# Patient Record
Sex: FEMALE | Race: ASIAN | NOT HISPANIC OR LATINO | Employment: OTHER | URBAN - METROPOLITAN AREA
[De-identification: names, ages, dates, MRNs, and addresses within clinical notes are randomized per-mention and may not be internally consistent; named-entity substitution may affect disease eponyms.]

---

## 2017-02-23 LAB
ALBUMIN SERPL-MCNC: 4.5 G/DL (ref 3.6–4.8)
ALBUMIN/CREAT UR: <7.2 MG/G CREAT (ref 0–30)
ALBUMIN/GLOB SERPL: 1.7 {RATIO} (ref 1.1–2.5)
ALP SERPL-CCNC: 42 IU/L (ref 39–117)
ALT SERPL-CCNC: 13 IU/L (ref 0–32)
AST SERPL-CCNC: 16 IU/L (ref 0–40)
BILIRUB SERPL-MCNC: 0.6 MG/DL (ref 0–1.2)
BUN SERPL-MCNC: 13 MG/DL (ref 8–27)
BUN/CREAT SERPL: 17 (ref 11–26)
CALCIUM SERPL-MCNC: 9.5 MG/DL (ref 8.7–10.3)
CHLORIDE SERPL-SCNC: 103 MMOL/L (ref 96–106)
CHOLEST SERPL-MCNC: 170 MG/DL (ref 100–199)
CO2 SERPL-SCNC: 26 MMOL/L (ref 18–29)
COMMENT 011824: NORMAL
CREAT SERPL-MCNC: 0.77 MG/DL (ref 0.57–1)
CREAT UR-MCNC: 41.4 MG/DL
GLOBULIN SER CALC-MCNC: 2.6 G/DL (ref 1.5–4.5)
GLUCOSE SERPL-MCNC: 111 MG/DL (ref 65–99)
HBA1C MFR BLD: 6.7 % (ref 4.8–5.6)
HDLC SERPL-MCNC: 56 MG/DL
LDLC SERPL CALC-MCNC: 85 MG/DL (ref 0–99)
MICROALBUMIN UR-MCNC: <3 UG/ML
POTASSIUM SERPL-SCNC: 4.1 MMOL/L (ref 3.5–5.2)
PROT SERPL-MCNC: 7.1 G/DL (ref 6–8.5)
SODIUM SERPL-SCNC: 144 MMOL/L (ref 134–144)
T4 FREE SERPL-MCNC: 1.44 NG/DL (ref 0.82–1.77)
TRIGL SERPL-MCNC: 147 MG/DL (ref 0–149)
TSH SERPL DL<=0.005 MIU/L-ACNC: 0.47 UIU/ML (ref 0.45–4.5)
VLDLC SERPL CALC-MCNC: 29 MG/DL (ref 5–40)

## 2017-02-27 ENCOUNTER — OFFICE VISIT (OUTPATIENT)
Dept: MEDICAL GROUP | Facility: PHYSICIAN GROUP | Age: 67
End: 2017-02-27
Payer: MEDICARE

## 2017-02-27 VITALS
TEMPERATURE: 98.6 F | DIASTOLIC BLOOD PRESSURE: 80 MMHG | RESPIRATION RATE: 16 BRPM | HEART RATE: 83 BPM | HEIGHT: 60 IN | SYSTOLIC BLOOD PRESSURE: 128 MMHG | BODY MASS INDEX: 28.86 KG/M2 | WEIGHT: 147 LBS | OXYGEN SATURATION: 91 %

## 2017-02-27 DIAGNOSIS — E11.9 DIABETES MELLITUS WITHOUT COMPLICATION (HCC): ICD-10-CM

## 2017-02-27 DIAGNOSIS — E03.9 HYPOTHYROIDISM, UNSPECIFIED TYPE: ICD-10-CM

## 2017-02-27 DIAGNOSIS — F33.9 RECURRENT MAJOR DEPRESSIVE DISORDER, REMISSION STATUS UNSPECIFIED (HCC): ICD-10-CM

## 2017-02-27 DIAGNOSIS — E78.5 DYSLIPIDEMIA: ICD-10-CM

## 2017-02-27 DIAGNOSIS — I10 ESSENTIAL HYPERTENSION: ICD-10-CM

## 2017-02-27 DIAGNOSIS — K21.9 GASTROESOPHAGEAL REFLUX DISEASE, ESOPHAGITIS PRESENCE NOT SPECIFIED: ICD-10-CM

## 2017-02-27 PROCEDURE — 3017F COLORECTAL CA SCREEN DOC REV: CPT | Performed by: FAMILY MEDICINE

## 2017-02-27 PROCEDURE — 3044F HG A1C LEVEL LT 7.0%: CPT | Performed by: FAMILY MEDICINE

## 2017-02-27 PROCEDURE — 1101F PT FALLS ASSESS-DOCD LE1/YR: CPT | Performed by: FAMILY MEDICINE

## 2017-02-27 PROCEDURE — 3014F SCREEN MAMMO DOC REV: CPT | Performed by: FAMILY MEDICINE

## 2017-02-27 PROCEDURE — G8420 CALC BMI NORM PARAMETERS: HCPCS | Performed by: FAMILY MEDICINE

## 2017-02-27 PROCEDURE — 1036F TOBACCO NON-USER: CPT | Performed by: FAMILY MEDICINE

## 2017-02-27 PROCEDURE — 99214 OFFICE O/P EST MOD 30 MIN: CPT | Performed by: FAMILY MEDICINE

## 2017-02-27 PROCEDURE — G8432 DEP SCR NOT DOC, RNG: HCPCS | Performed by: FAMILY MEDICINE

## 2017-02-27 PROCEDURE — G8482 FLU IMMUNIZE ORDER/ADMIN: HCPCS | Performed by: FAMILY MEDICINE

## 2017-02-27 PROCEDURE — 4040F PNEUMOC VAC/ADMIN/RCVD: CPT | Performed by: FAMILY MEDICINE

## 2017-02-27 RX ORDER — METOPROLOL SUCCINATE 50 MG/1
50 TABLET, EXTENDED RELEASE ORAL DAILY
Qty: 90 TAB | Refills: 3 | Status: SHIPPED | OUTPATIENT
Start: 2017-02-27 | End: 2018-03-26 | Stop reason: SDUPTHER

## 2017-02-27 RX ORDER — PANTOPRAZOLE SODIUM 40 MG/1
TABLET, DELAYED RELEASE ORAL
Qty: 90 TAB | Refills: 3 | Status: SHIPPED | OUTPATIENT
Start: 2017-02-27 | End: 2018-04-24 | Stop reason: SDUPTHER

## 2017-02-27 RX ORDER — ESCITALOPRAM OXALATE 10 MG/1
TABLET ORAL
Qty: 90 TAB | Refills: 3 | Status: SHIPPED | OUTPATIENT
Start: 2017-02-27 | End: 2018-03-26 | Stop reason: SDUPTHER

## 2017-02-27 RX ORDER — METFORMIN HYDROCHLORIDE 500 MG/1
500 TABLET, EXTENDED RELEASE ORAL DAILY
Qty: 90 TAB | Refills: 3 | Status: SHIPPED | OUTPATIENT
Start: 2017-02-27 | End: 2018-01-24

## 2017-02-27 RX ORDER — LOSARTAN POTASSIUM 50 MG/1
TABLET ORAL
Qty: 90 TAB | Refills: 3 | Status: SHIPPED | OUTPATIENT
Start: 2017-02-27 | End: 2018-03-26 | Stop reason: SDUPTHER

## 2017-02-27 NOTE — MR AVS SNAPSHOT
Bernadette Freitas   2017 2:20 PM   Office Visit   MRN: [id number]    Department:  [facility]   Dept Phone:  [phone]    Description:  Female : 1950   Provider:  Renetta Khan M.D.           Reason for Visit     Follow-Up 3 month follow up       Allergies as of 2017     Allergen Noted Reactions    Penicillins 2008   Vomiting      You were diagnosed with     Dyslipidemia   [645050]       Diabetes mellitus without complication (CMS-HCC)   [340415]       Essential hypertension   [7511613]       Gastroesophageal reflux disease, esophagitis presence not specified   [3547239]       Hypothyroidism, unspecified type   [3903505]       Recurrent major depressive disorder, remission status unspecified (CMS-HCC)   [4145622]         Vital Signs     Blood Pressure Pulse Temperature Respirations Height Weight    128/80 mmHg 83 37 °C (98.6 °F) 16 1.524 m (5') 66.679 kg (147 lb)    Body Mass Index Oxygen Saturation Last Menstrual Period Smoking Status          28.71 kg/m2 91% 2006 Former Smoker        Basic Information     Date Of Birth Sex Race Ethnicity Preferred Language    1950 Female  Non- English      Your appointments     May 30, 2017  3:40 PM   Established Patient with Renetta Khan M.D.   [facility] (--)    [address]  [address]  [facility] [address] [phone]           You will be receiving a confirmation call a few days before your appointment from our automated call confirmation system.              Problem List              ICD-10-CM Priority Class Noted - Resolved    HTN (hypertension) I10   Unknown - Present    Hypothyroidism E03.9   Unknown - Present    Dyslipidemia E78.5   Unknown - Present    Depression F32.9   Unknown - Present    Impaired fasting glucose R73.01   Unknown - Present    GERD (gastroesophageal reflux disease) K21.9   2010 - Present    Essential hypertension I10   2015 - Present    Essential hypertension  I10   8/22/2016 - Present    Diabetes mellitus without complication (CMS-[facility]) E11.9   11/28/2016 - Present      Health Maintenance        Date Due Completion Dates    IMM ZOSTER VACCINE 8/24/2011 ---    DIABETES MONOFILAMENT / LE EXAM 5/8/2013 5/8/2012 (N/S)    Override on 5/8/2012: (N/S) (not diabetic)    PAP SMEAR 1/22/2016 1/22/2013, 9/29/2010    RETINAL SCREENING 7/24/2016 6/24/2016    BONE DENSITY 8/24/2016 ---    COLONOSCOPY 11/19/2016 11/19/2013    MAMMOGRAM 2/16/2017 2/16/2016, 2/5/2016, 2/5/2016, 2/5/2016, 8/1/2014, 10/11/2010, 9/16/2009, 9/16/2009    A1C SCREENING 8/22/2017 2/22/2017, 11/21/2016, 8/16/2016, 5/9/2016, 1/26/2016, 7/13/2015, 7/13/2015 (Done), 9/9/2014 (Done), 2/5/2014 (N/S), 12/18/2012, 12/13/2011, 9/29/2010    Override on 7/13/2015: Done    Override on 9/9/2014: Done    Override on 2/5/2014: (N/S)    FASTING LIPID PROFILE 2/22/2018 2/22/2017, 11/21/2016, 8/16/2016, 5/9/2016, 1/26/2016, 7/13/2015, 7/13/2015 (Done), 9/9/2014 (Done), 2/5/2014 (Done), 12/18/2012, 12/13/2011, 6/8/2011, 9/29/2010    Override on 7/13/2015: Done    Override on 9/9/2014: Done    Override on 2/5/2014: Done    URINE ACR / MICROALBUMIN 2/22/2018 2/22/2017, 1/26/2016, 6/3/2014 (Done), 2/5/2014 (Done), 5/8/2012 (N/S)    Override on 6/3/2014: Done    Override on 2/5/2014: Done    Override on 5/8/2012: (N/S) (not diabetic)    SERUM CREATININE 2/22/2018 2/22/2017, 11/21/2016, 9/26/2016, 8/16/2016, 5/9/2016, 1/26/2016, 7/13/2015, 7/13/2015 (Done), 9/9/2014 (Done), 2/5/2014 (Done), 12/18/2012, 12/13/2011, 6/8/2011, 9/29/2010, 6/3/2008, 6/2/2008, 6/1/2008, 5/31/2008, 5/30/2008    Override on 7/13/2015: Done    Override on 9/9/2014: Done    Override on 2/5/2014: Done    IMM PNEUMOCOCCAL 65+ (ADULT) LOW/MEDIUM RISK SERIES (2 of 2 - PPSV23) 7/21/2020 11/1/2016, 7/21/2015    IMM DTaP/Tdap/Td Vaccine (2 - Td) 1/22/2023 1/22/2013, 3/15/2006            Current Immunizations     13-VALENT PCV PREVNAR 11/1/2016    Influenza Vaccine  Adult HD 11/1/2016    Pneumococcal polysaccharide vaccine (PPSV-23) 7/21/2015    TD Vaccine 3/15/2006    Tdap Vaccine 1/22/2013      Below and/or attached are the medications your provider expects you to take. Review all of your home medications and newly ordered medications with your provider and/or pharmacist. Follow medication instructions as directed by your provider and/or pharmacist. Please keep your medication list with you and share with your provider. Update the information when medications are discontinued, doses are changed, or new medications (including over-the-counter products) are added; and carry medication information at all times in the event of emergency situations     Allergies:  PENICILLINS - Vomiting               Medications  Valid as of: February 27, 2017 -  2:46 PM    Generic Name Brand Name Tablet Size Instructions for use    ALPRAZolam (Tab) XANAX 0.25 MG Take 1 Tab by mouth 3 times a day as needed for Sleep or Anxiety.        ALPRAZolam (Tab) XANAX 0.25 MG Take 1 Tab by mouth 3 times a day as needed for Sleep or Anxiety.        AmLODIPine Besylate (Tab) NORVASC 10 MG QD        AmLODIPine Besylate (Tab) NORVASC 10 MG Take 1 Tab by mouth every day.        Atorvastatin Calcium (Tab) LIPITOR 20 MG Take 1 Tab by mouth every bedtime.        Atorvastatin Calcium (Tab) LIPITOR 40 MG Take 1 tablet by mouth  every evening        Atorvastatin Calcium (Tab) LIPITOR 40 MG TAKE 1 TABLET BY MOUTH ONCE DAILY IN THE EVENING        Blood Glucose Monitoring Suppl (Misc) Blood Glucose Monitoring Suppl SUPPLIES Test strips order: Test strips for glucometer. Sig: use before breakfast and prn ssx high or low sugar #50 RF x 5        Escitalopram Oxalate (Tab) LEXAPRO 10 MG Take 1 tablet by mouth  daily        Esomeprazole Magnesium (CAPSULE DELAYED RELEASE) NEXIUM 40 MG Take 1 Cap by mouth every morning before breakfast.        HydroCHLOROthiazide (Cap) MICROZIDE 12.5 MG Take 1 Cap by mouth every day.         Lancets (Misc) Lancets  Lancets order: Lancets for glucometer. Sig: use before breakfast and prn ssx high or low sugar. #50 RF x 5        Lancets (Misc) TRUEPLUS LANCETS 28G  USE ONCE DAILY AND AS NEEDED FOR SYMPTOMS OF HIGH OR LOW SUGAR        Levothyroxine Sodium (Tab) SYNTHROID 100 MCG Take 1 Tab by mouth every day.        Levothyroxine Sodium (Tab) SYNTHROID 88 MCG Take 1 Tab by mouth Every morning on an empty stomach.        Levothyroxine Sodium (Tab) SYNTHROID 75 MCG Take 1 Tab by mouth Every morning on an empty stomach.        Levothyroxine Sodium (Tab) SYNTHROID 50 MCG Take 1 Tab by mouth Every morning on an empty stomach.        Levothyroxine Sodium (Tab) SYNTHROID 75 MCG Take 1 Tab by mouth Every morning on an empty stomach.        Losartan Potassium (Tab) COZAAR 50 MG Take 1 tablet by mouth  daily        Meloxicam (Tab) MOBIC 15 MG Take 1 Tab by mouth every day.        MetFORMIN HCl (Tab) GLUCOPHAGE 500 MG Take 1 Tab by mouth 2 times a day, with meals.        MetFORMIN HCl (TABLET SR 24 HR) GLUCOPHAGE  MG Take 1 Tab by mouth every day.        Metoprolol Succinate (TABLET SR 24 HR) TOPROL XL 50 MG Take 1 Tab by mouth every day.        Mometasone Furoate (Suspension) NASONEX 50 MCG/ACT Spray 2 Act in nose every day. Each nostril        Omega-3 Fatty Acids (Cap) OMEGA 3 FA 1000 MG Take 1,000 mg by mouth 2 Times a Day.        Pantoprazole Sodium (Tablet Delayed Response) PROTONIX 40 MG Take 1 tablet by mouth  every day        Pravastatin Sodium (Tab) PRAVACHOL 40 MG Take 1 Tab by mouth every evening.        Triamcinolone Acetonide (Cream) KENALOG 0.1 % Apply to affedted areas twice daily.        .                 Medicines prescribed today were sent to:     [id number] - [place]A MCINTOSH - [address] AT [facility] OF [address]    [address] [address]    Phone: [phone] Fax: [phone]    Open 24 Hours?: No      Medication refill instructions:       If your  prescription bottle indicates you have medication refills left, it is not necessary to call your provider’s office. Please contact your pharmacy and they will refill your medication.    If your prescription bottle indicates you do not have any refills left, you may request refills at any time through one of the following ways: The online [employer] system (except Urgent Care), by calling your provider’s office, or by asking your pharmacy to contact your provider’s office with a refill request. Medication refills are processed only during regular business hours and may not be available until the next business day. Your provider may request additional information or to have a follow-up visit with you prior to refilling your medication.   *Please Note: Medication refills are assigned a new Rx number when refilled electronically. Your pharmacy may indicate that no refills were authorized even though a new prescription for the same medication is available at the pharmacy. Please request the medicine by name with the pharmacy before contacting your provider for a refill.        Your To Do List     Future Labs/Procedures Complete By Expires    COMP METABOLIC PANEL  As directed 2/28/2018    HEMOGLOBIN A1C  As directed 2/28/2018    LIPID PROFILE  As directed 2/28/2018         [employer] Access Code: Activation code not generated  Current [employer] Status: Active

## 2017-02-28 NOTE — PROGRESS NOTES
Subjective:      Bernadette Freitas is a 66 y.o. female who presents with Follow-Up            HPI     Patient is here for follow-up of her medical problems.    Female, she continues to take atorvastatin 40 mg daily without any myalgias.    In terms of her diabetes mellitus type 2, she continues to take metformin xr 500 mg one tablet daily without any side effects. Blood work was done before his visit.    Her blood pressures under control on losartan and metoprolol without any side effects. Patient denies a headache, dizziness, lightheadedness, chest pain, palpitations, shortness of breath, leg edema.    For her GERD, she continues to take pantoprazole 40 mg one tablet daily on a regular basis. She says she still gets gassiness with burping even with the pantoprazole on a daily basis. She states that she is already due for screening colonoscopy because of prior polyps that were precancerous.    In terms of her hypothyroidism, she continues to take thyroid replacement regularly.    She continues to take Lexapro regularly on a daily basis with good control of her depression.    Past medical history, past surgical history, family history reviewed-no changes    Social history reviewed-no changes    Allergies reviewed-no changes    Medications reviewed-no changes    ROS     Review of systems as per history of present illness, the rest are negative     Objective:     /80 mmHg  Pulse 83  Temp(Src) 37 °C (98.6 °F)  Resp 16  Ht 1.524 m (5')  Wt 66.679 kg (147 lb)  BMI 28.71 kg/m2  SpO2 91%  LMP 11/01/2006     Physical Exam     Examined alert, awake, oriented, not in distress    Neck-supple, no lymphadenopathy, no thyromegaly  Lungs-clear to auscultation, no rales, no wheezes  Heart-regular rate and rhythm, no murmur  Extremities-no edema, clubbing, cyanosis     Orders Only on 02/22/2017   Component Date Value   • Glucose 02/22/2017 111*   • Bun 02/22/2017 13    • Creatinine 02/22/2017 0.77    • GFR If Non   Ameri* 02/22/2017 81    • GFR If  02/22/2017 94    • Bun-Creatinine Ratio 02/22/2017 17    • Sodium 02/22/2017 144    • Potassium 02/22/2017 4.1    • Chloride 02/22/2017 103    • Co2 02/22/2017 26    • Calcium 02/22/2017 9.5    • Total Protein 02/22/2017 7.1    • Albumin 02/22/2017 4.5    • Globulin 02/22/2017 2.6    • A-G Ratio 02/22/2017 1.7    • Total Bilirubin 02/22/2017 0.6    • Alkaline Phosphatase 02/22/2017 42    • AST(SGOT) 02/22/2017 16    • ALT(SGPT) 02/22/2017 13    • Cholesterol,Tot 02/22/2017 170    • Triglycerides 02/22/2017 147    • HDL 02/22/2017 56    • VLDL Cholesterol Calc 02/22/2017 29    • LDL 02/22/2017 85    • Comment: 02/22/2017 CANCELED    • Creatinine, Random Urine 02/22/2017 41.4    • Microalbumin, Urine Rand* 02/22/2017 <3.0    • Microalbumin-Creatinine 02/22/2017 <7.2    • Glycohemoglobin 02/22/2017 6.7*previously 6.7    • Free T-4 02/22/2017 1.44    • TSH 02/22/2017 0.465         Assessment/Plan:     1. Dyslipidemia  All at target on atorvastatin.  - LIPID PROFILE; Future  - COMP METABOLIC PANEL; Future  - HEMOGLOBIN A1C; Future    2. Diabetes mellitus without complication (CMS-HCC)  Adequately controlled on metformin.  - metformin ER (GLUCOPHAGE XR) 500 MG TABLET SR 24 HR; Take 1 Tab by mouth every day.  Dispense: 90 Tab; Refill: 3  - LIPID PROFILE; Future  - COMP METABOLIC PANEL; Future  - HEMOGLOBIN A1C; Future    3. Essential hypertension  Controlled on her medications.  - losartan (COZAAR) 50 MG Tab; Take 1 tablet by mouth  daily  Dispense: 90 Tab; Refill: 3  - metoprolol SR (TOPROL XL) 50 MG TABLET SR 24 HR; Take 1 Tab by mouth every day.  Dispense: 90 Tab; Refill: 3  - LIPID PROFILE; Future  - COMP METABOLIC PANEL; Future  - HEMOGLOBIN A1C; Future    4. Gastroesophageal reflux disease, esophagitis presence not specified  Pantoprazole controls her GERD symptoms but she continues to have burping and gassiness. Advised to get over-the-counter probiotics. She  will set up appointment with GI specialist because she is also due to have another colonoscopy. This way they could do upper endoscopy if they think it is necessary.  - pantoprazole (PROTONIX) 40 MG Tablet Delayed Response; Take 1 tablet by mouth  every day  Dispense: 90 Tab; Refill: 3  - LIPID PROFILE; Future  - COMP METABOLIC PANEL; Future  - HEMOGLOBIN A1C; Future    5. Hypothyroidism, unspecified type  This is adequately replaced. Continue same dose of thyroid medication.  - LIPID PROFILE; Future  - COMP METABOLIC PANEL; Future  - HEMOGLOBIN A1C; Future    6. Recurrent major depressive disorder, remission status unspecified (CMS-HCC)  Stable on Lexapro.  - escitalopram (LEXAPRO) 10 MG Tab; Take 1 tablet by mouth  daily  Dispense: 90 Tab; Refill: 3  - LIPID PROFILE; Future  - COMP METABOLIC PANEL; Future  - HEMOGLOBIN A1C; Future    We will do a GYN exam/Pap smear next visit      Please note that this dictation was created using voice recognition software. I have worked with consultants from the vendor as well as technical experts from [employer] to optimize the interface. I have made every reasonable attempt to correct obvious errors, but I expect that there are errors of grammar and possibly content I did not discover before finalizing the note.

## 2017-02-28 NOTE — PATIENT INSTRUCTIONS
Patient instructions given regarding labs, x-rays, medications, referral and followup appointment.

## 2017-05-08 DIAGNOSIS — E78.5 DYSLIPIDEMIA: ICD-10-CM

## 2017-05-08 RX ORDER — ATORVASTATIN CALCIUM 40 MG/1
TABLET, FILM COATED ORAL
Qty: 90 TAB | Refills: 1 | Status: SHIPPED | OUTPATIENT
Start: 2017-05-08 | End: 2018-01-02 | Stop reason: SDUPTHER

## 2017-05-25 LAB
ALBUMIN SERPL-MCNC: 4.1 G/DL (ref 3.6–4.8)
ALBUMIN/GLOB SERPL: 1.6 {RATIO} (ref 1.2–2.2)
ALP SERPL-CCNC: 41 IU/L (ref 39–117)
ALT SERPL-CCNC: 13 IU/L (ref 0–32)
AST SERPL-CCNC: 17 IU/L (ref 0–40)
BILIRUB SERPL-MCNC: 0.5 MG/DL (ref 0–1.2)
BUN SERPL-MCNC: 18 MG/DL (ref 8–27)
BUN/CREAT SERPL: 20 (ref 12–28)
CALCIUM SERPL-MCNC: 9.3 MG/DL (ref 8.7–10.3)
CHLORIDE SERPL-SCNC: 102 MMOL/L (ref 96–106)
CHOLEST SERPL-MCNC: 194 MG/DL (ref 100–199)
CO2 SERPL-SCNC: 22 MMOL/L (ref 18–29)
COMMENT 011824: ABNORMAL
CREAT SERPL-MCNC: 0.89 MG/DL (ref 0.57–1)
GLOBULIN SER CALC-MCNC: 2.5 G/DL (ref 1.5–4.5)
GLUCOSE SERPL-MCNC: 119 MG/DL (ref 65–99)
HBA1C MFR BLD: 7 % (ref 4.8–5.6)
HDLC SERPL-MCNC: 51 MG/DL
LDLC SERPL CALC-MCNC: 100 MG/DL (ref 0–99)
POTASSIUM SERPL-SCNC: 4.7 MMOL/L (ref 3.5–5.2)
PROT SERPL-MCNC: 6.6 G/DL (ref 6–8.5)
SODIUM SERPL-SCNC: 144 MMOL/L (ref 134–144)
TRIGL SERPL-MCNC: 214 MG/DL (ref 0–149)
VLDLC SERPL CALC-MCNC: 43 MG/DL (ref 5–40)

## 2017-05-30 ENCOUNTER — OFFICE VISIT (OUTPATIENT)
Dept: MEDICAL GROUP | Facility: PHYSICIAN GROUP | Age: 67
End: 2017-05-30
Payer: MEDICARE

## 2017-05-30 ENCOUNTER — HOSPITAL ENCOUNTER (OUTPATIENT)
Facility: MEDICAL CENTER | Age: 67
End: 2017-05-30
Attending: FAMILY MEDICINE
Payer: MEDICARE

## 2017-05-30 VITALS
HEART RATE: 62 BPM | WEIGHT: 147.4 LBS | TEMPERATURE: 98.1 F | HEIGHT: 60 IN | OXYGEN SATURATION: 96 % | RESPIRATION RATE: 14 BRPM | DIASTOLIC BLOOD PRESSURE: 80 MMHG | BODY MASS INDEX: 28.94 KG/M2 | SYSTOLIC BLOOD PRESSURE: 122 MMHG

## 2017-05-30 DIAGNOSIS — Z12.39 SCREENING FOR BREAST CANCER: ICD-10-CM

## 2017-05-30 DIAGNOSIS — E78.5 DYSLIPIDEMIA: ICD-10-CM

## 2017-05-30 DIAGNOSIS — Z78.0 POSTMENOPAUSAL: ICD-10-CM

## 2017-05-30 DIAGNOSIS — I10 ESSENTIAL HYPERTENSION: ICD-10-CM

## 2017-05-30 DIAGNOSIS — E03.9 HYPOTHYROIDISM, UNSPECIFIED TYPE: ICD-10-CM

## 2017-05-30 DIAGNOSIS — Z01.419 ENCOUNTER FOR ROUTINE GYNECOLOGICAL EXAMINATION WITH PAPANICOLAOU SMEAR OF CERVIX: ICD-10-CM

## 2017-05-30 DIAGNOSIS — E11.9 DIABETES MELLITUS WITHOUT COMPLICATION (HCC): ICD-10-CM

## 2017-05-30 DIAGNOSIS — Z11.51 SCREENING FOR HPV (HUMAN PAPILLOMAVIRUS): ICD-10-CM

## 2017-05-30 PROCEDURE — G0101 CA SCREEN;PELVIC/BREAST EXAM: HCPCS | Performed by: FAMILY MEDICINE

## 2017-05-30 PROCEDURE — 4040F PNEUMOC VAC/ADMIN/RCVD: CPT | Performed by: FAMILY MEDICINE

## 2017-05-30 PROCEDURE — 87624 HPV HI-RISK TYP POOLED RSLT: CPT

## 2017-05-30 PROCEDURE — 3014F SCREEN MAMMO DOC REV: CPT | Performed by: FAMILY MEDICINE

## 2017-05-30 PROCEDURE — 99214 OFFICE O/P EST MOD 30 MIN: CPT | Mod: 25 | Performed by: FAMILY MEDICINE

## 2017-05-30 PROCEDURE — 3045F PR MOST RECENT HEMOGLOBIN A1C LEVEL 7.0-9.0%: CPT | Performed by: FAMILY MEDICINE

## 2017-05-30 PROCEDURE — 1036F TOBACCO NON-USER: CPT | Performed by: FAMILY MEDICINE

## 2017-05-30 PROCEDURE — 1101F PT FALLS ASSESS-DOCD LE1/YR: CPT | Performed by: FAMILY MEDICINE

## 2017-05-30 PROCEDURE — 3017F COLORECTAL CA SCREEN DOC REV: CPT | Performed by: FAMILY MEDICINE

## 2017-05-30 PROCEDURE — G8432 DEP SCR NOT DOC, RNG: HCPCS | Performed by: FAMILY MEDICINE

## 2017-05-30 PROCEDURE — 88175 CYTOPATH C/V AUTO FLUID REDO: CPT

## 2017-05-30 PROCEDURE — G8417 CALC BMI ABV UP PARAM F/U: HCPCS | Performed by: FAMILY MEDICINE

## 2017-05-30 NOTE — MR AVS SNAPSHOT
Bernadette Freitas   2017 3:40 PM   Office Visit   MRN: [id number]    Department:  [facility]   Dept Phone:  [phone]    Description:  Female : 1950   Provider:  Renetta Khan M.D.           Reason for Visit     Gynecologic Exam PAP      Allergies as of 2017     Allergen Noted Reactions    Penicillins 2008   Vomiting      You were diagnosed with     Encounter for routine gynecological examination with Papanicolaou smear of cervix   [702111]       Screening for HPV (human papillomavirus)   [830284]       Diabetes mellitus without complication (CMS-[facility])   [855123]       Essential hypertension   [5537731]       Dyslipidemia   [146073]       Screening for breast cancer   [514172]       Hypothyroidism, unspecified type   [4011636]       Postmenopausal   [109562]         Vital Signs     Blood Pressure Pulse Temperature Respirations Height Weight    122/80 mmHg 62 36.7 °C (98.1 °F) 14 1.524 m (5') 66.86 kg (147 lb 6.4 oz)    Body Mass Index Oxygen Saturation Last Menstrual Period Smoking Status          28.79 kg/m2 96% 2006 Former Smoker        Basic Information     Date Of Birth Sex Race Ethnicity Preferred Language    1950 Female  Non- English      Your appointments     Oct 17, 2017  3:20 PM   Established Patient with Renetta Khan M.D.   [facility] (--)    [address]  [address]  [facility] [id number] [phone]           You will be receiving a confirmation call a few days before your appointment from our automated call confirmation system.              Problem List              ICD-10-CM Priority Class Noted - Resolved    HTN (hypertension) I10   Unknown - Present    Hypothyroidism E03.9   Unknown - Present    Dyslipidemia E78.5   Unknown - Present    Depression F32.9   Unknown - Present    Impaired fasting glucose R73.01   Unknown - Present    GERD (gastroesophageal reflux disease) K21.9   2010 - Present    Essential hypertension  I10   7/21/2015 - Present    Essential hypertension I10   8/22/2016 - Present    Diabetes mellitus without complication (CMS-[facility]) E11.9   11/28/2016 - Present      Health Maintenance        Date Due Completion Dates    DIABETES MONOFILAMENT / LE EXAM 5/8/2013 5/8/2012 (N/S)    Override on 5/8/2012: (N/S) (not diabetic)    PAP SMEAR 1/22/2016 1/22/2013, 9/29/2010    RETINAL SCREENING 7/24/2016 6/24/2016    BONE DENSITY 8/24/2016 ---    COLONOSCOPY 11/19/2016 11/19/2013    MAMMOGRAM 2/16/2017 2/16/2016, 8/1/2014, 10/11/2010, 9/16/2009, 9/16/2009    IMM ZOSTER VACCINE 5/30/2018 (Originally 8/24/2011) ---    A1C SCREENING 11/24/2017 5/24/2017, 2/22/2017, 11/21/2016, 8/16/2016, 5/9/2016, 1/26/2016, 7/13/2015, 7/13/2015 (Done), 9/9/2014 (Done), 2/5/2014 (N/S), 12/18/2012, 12/13/2011, 9/29/2010    Override on 7/13/2015: Done    Override on 9/9/2014: Done    Override on 2/5/2014: (N/S)    URINE ACR / MICROALBUMIN 2/22/2018 2/22/2017, 1/26/2016, 6/3/2014 (Done), 2/5/2014 (Done), 5/8/2012 (N/S)    Override on 6/3/2014: Done    Override on 2/5/2014: Done    Override on 5/8/2012: (N/S) (not diabetic)    FASTING LIPID PROFILE 5/24/2018 5/24/2017, 2/22/2017, 11/21/2016, 8/16/2016, 5/9/2016, 1/26/2016, 7/13/2015, 7/13/2015 (Done), 9/9/2014 (Done), 2/5/2014 (Done), 12/18/2012, 12/13/2011, 6/8/2011, 9/29/2010    Override on 7/13/2015: Done    Override on 9/9/2014: Done    Override on 2/5/2014: Done    SERUM CREATININE 5/24/2018 5/24/2017, 2/22/2017, 11/21/2016, 9/26/2016, 8/16/2016, 5/9/2016, 1/26/2016, 7/13/2015, 7/13/2015 (Done), 9/9/2014 (Done), 2/5/2014 (Done), 12/18/2012, 12/13/2011, 6/8/2011, 9/29/2010, 6/3/2008, 6/2/2008, 6/1/2008, 5/31/2008, 5/30/2008    Override on 7/13/2015: Done    Override on 9/9/2014: Done    Override on 2/5/2014: Done    IMM PNEUMOCOCCAL 65+ (ADULT) LOW/MEDIUM RISK SERIES (2 of 2 - PPSV23) 7/21/2020 11/1/2016, 7/21/2015    IMM DTaP/Tdap/Td Vaccine (2 - Td) 1/22/2023 1/22/2013, 3/15/2006               Current Immunizations     13-VALENT PCV PREVNAR 11/1/2016    Influenza Vaccine Adult HD 11/1/2016    Pneumococcal polysaccharide vaccine (PPSV-23) 7/21/2015    TD Vaccine 3/15/2006    Tdap Vaccine 1/22/2013      Below and/or attached are the medications your provider expects you to take. Review all of your home medications and newly ordered medications with your provider and/or pharmacist. Follow medication instructions as directed by your provider and/or pharmacist. Please keep your medication list with you and share with your provider. Update the information when medications are discontinued, doses are changed, or new medications (including over-the-counter products) are added; and carry medication information at all times in the event of emergency situations     Allergies:  PENICILLINS - Vomiting               Medications  Valid as of: May 30, 2017 -  4:11 PM    Generic Name Brand Name Tablet Size Instructions for use    ALPRAZolam (Tab) XANAX 0.25 MG Take 1 Tab by mouth 3 times a day as needed for Sleep or Anxiety.        ALPRAZolam (Tab) XANAX 0.25 MG Take 1 Tab by mouth 3 times a day as needed for Sleep or Anxiety.        AmLODIPine Besylate (Tab) NORVASC 10 MG QD        AmLODIPine Besylate (Tab) NORVASC 10 MG Take 1 Tab by mouth every day.        Atorvastatin Calcium (Tab) LIPITOR 20 MG Take 1 Tab by mouth every bedtime.        Atorvastatin Calcium (Tab) LIPITOR 40 MG TAKE 1 TABLET BY MOUTH ONCE DAILY IN THE EVENING        Atorvastatin Calcium (Tab) LIPITOR 40 MG Take 1 tablet by mouth  every evening        Blood Glucose Monitoring Suppl (Misc) Blood Glucose Monitoring Suppl SUPPLIES Test strips order: Test strips for glucometer. Sig: use before breakfast and prn ssx high or low sugar #50 RF x 5        Escitalopram Oxalate (Tab) LEXAPRO 10 MG Take 1 tablet by mouth  daily        Esomeprazole Magnesium (CAPSULE DELAYED RELEASE) NEXIUM 40 MG Take 1 Cap by mouth every morning before breakfast.        Glucose  Blood (Strip) FREESTYLE LITE  TEST BEFORE BREAKFAST AND AS NEEDED FOR SIGNS AND SYMPTOMS OF HIGH OR LOW SUGAR        HydroCHLOROthiazide (Cap) MICROZIDE 12.5 MG Take 1 Cap by mouth every day.        Lancets (Misc) Lancets  Lancets order: Lancets for glucometer. Sig: use before breakfast and prn ssx high or low sugar. #50 RF x 5        Lancets (Misc) TRUEPLUS LANCETS 28G  USE ONCE DAILY AND AS NEEDED FOR SYMPTOMS OF HIGH OR LOW SUGAR        Levothyroxine Sodium (Tab) SYNTHROID 100 MCG Take 1 Tab by mouth every day.        Levothyroxine Sodium (Tab) SYNTHROID 88 MCG Take 1 Tab by mouth Every morning on an empty stomach.        Levothyroxine Sodium (Tab) SYNTHROID 75 MCG Take 1 Tab by mouth Every morning on an empty stomach.        Levothyroxine Sodium (Tab) SYNTHROID 50 MCG Take 1 Tab by mouth Every morning on an empty stomach.        Levothyroxine Sodium (Tab) SYNTHROID 75 MCG Take 1 Tab by mouth Every morning on an empty stomach.        Losartan Potassium (Tab) COZAAR 50 MG Take 1 tablet by mouth  daily        Meloxicam (Tab) MOBIC 15 MG Take 1 Tab by mouth every day.        MetFORMIN HCl (Tab) GLUCOPHAGE 500 MG TAKE 1 TABLET BY MOUTH TWICE DAILY WITH MEALS        MetFORMIN HCl (TABLET SR 24 HR) GLUCOPHAGE  MG Take 1 Tab by mouth every day.        Metoprolol Succinate (TABLET SR 24 HR) TOPROL XL 50 MG Take 1 Tab by mouth every day.        Mometasone Furoate (Suspension) NASONEX 50 MCG/ACT Spray 2 Act in nose every day. Each nostril        Omega-3 Fatty Acids (Cap) OMEGA 3 FA 1000 MG Take 1,000 mg by mouth 2 Times a Day.        Pantoprazole Sodium (Tablet Delayed Response) PROTONIX 40 MG Take 1 tablet by mouth  every day        Pravastatin Sodium (Tab) PRAVACHOL 40 MG Take 1 Tab by mouth every evening.        Triamcinolone Acetonide (Cream) KENALOG 0.1 % Apply to affedted areas twice daily.        .                 Medicines prescribed today were sent to:     [facility], [place] - [address]   AT [address] [place] [address]    Phone: [phone] Fax: [phone]    Open 24 Hours?: No      Medication refill instructions:       If your prescription bottle indicates you have medication refills left, it is not necessary to call your provider’s office. Please contact your pharmacy and they will refill your medication.    If your prescription bottle indicates you do not have any refills left, you may request refills at any time through one of the following ways: The online [facility] system (except Urgent Care), by calling your provider’s office, or by asking your pharmacy to contact your provider’s office with a refill request. Medication refills are processed only during regular business hours and may not be available until the next business day. Your provider may request additional information or to have a follow-up visit with you prior to refilling your medication.   *Please Note: Medication refills are assigned a new Rx number when refilled electronically. Your pharmacy may indicate that no refills were authorized even though a new prescription for the same medication is available at the pharmacy. Please request the medicine by name with the pharmacy before contacting your provider for a refill.        Your To Do List     Future Labs/Procedures Complete By Expires    CBC WITH DIFFERENTIAL  As directed 5/31/2018    COMP METABOLIC PANEL  As directed 5/31/2018    DS-BONE DENSITY STUDY (DEXA)  As directed 11/30/2017    HEMOGLOBIN A1C  As directed 5/31/2018    LIPID PROFILE  As directed 5/31/2018    MA-SCREEN MAMMO W/CAD-BILAT  As directed 6/30/2018    TSH  As directed 5/31/2018         [facility] Access Code: Activation code not generated  Current [facility] Status: Active

## 2017-05-31 DIAGNOSIS — Z11.51 SCREENING FOR HPV (HUMAN PAPILLOMAVIRUS): ICD-10-CM

## 2017-05-31 DIAGNOSIS — Z01.419 ENCOUNTER FOR ROUTINE GYNECOLOGICAL EXAMINATION WITH PAPANICOLAOU SMEAR OF CERVIX: ICD-10-CM

## 2017-05-31 LAB
CYTOLOGY REG CYTOL: NORMAL
HPV HR 12 DNA CVX QL NAA+PROBE: NEGATIVE
HPV16 DNA SPEC QL NAA+PROBE: NEGATIVE
HPV18 DNA SPEC QL NAA+PROBE: NEGATIVE
SPECIMEN SOURCE: NORMAL

## 2017-05-31 NOTE — PROGRESS NOTES
Subjective:      Bernadette Freitas is a 66 y.o. female who presents with Gynecologic Exam            Gynecologic Exam      Patient is here for routine GYN exam/Pap smear. Her last Pap smear was in 2013 that came back normal. No history of abnormal Pap smears. No history of STDs. Patient is  4 para 3, one miscarriage. Last mammogram was in  that came back within normal limits.    Her last flu shot was in . Tdap was in . She already had her Pneumovax and Prevnar shots.    In terms of her diabetes mellitus type 2, she continues to take metformin but she is only taking 500 mg one tablet once a day instead of twice a day without any problems or side effects.    For her hypertension, this is under control on losartan and metoprolol. She denies any problems with chest pain, palpitations, shortness of breath, leg edema, headache, dizziness, lightheadedness.    In terms of her dyslipidemia, she continues to take atorvastatin without any myalgias.    She continues to take thyroid replacement for hypothyroidism. Last TSH was adequately replaced in .    I reviewed the following    Past Medical History   Diagnosis Date   • Hypercholesteremia    • HTN (hypertension)    • Hypothyroidism    • Dyslipidemia    • Depression    • Impaired fasting glucose         Past Surgical History   Procedure Laterality Date   • Primary c section       x1   • Thyroidectomy total       toxic goiter   • Colonoscopy       polyp- tubular adenoma - GI consultants   • Gastroscopy       acid reflux   • Breast biopsy  3/13     left breast-benign   • Colonoscopy with polyp  2013     2 polyps-benign polypoid and adenomatous,severe diverticulosis entire colon, medium external hemorrhoids,       Allergies   Allergen Reactions   • Penicillins Vomiting       Current Outpatient Prescriptions   Medication Sig Dispense Refill   • atorvastatin (LIPITOR) 40 MG Tab Take 1 tablet by mouth  every evening 90 Tab 1   •  FREESTYLE LITE strip TEST BEFORE BREAKFAST AND AS NEEDED FOR SIGNS AND SYMPTOMS OF HIGH OR LOW SUGAR 50 Strip 11   • metformin (GLUCOPHAGE) 500 MG Tab TAKE 1 TABLET BY MOUTH TWICE DAILY WITH MEALS 180 Tab 3   • losartan (COZAAR) 50 MG Tab Take 1 tablet by mouth  daily 90 Tab 3   • escitalopram (LEXAPRO) 10 MG Tab Take 1 tablet by mouth  daily 90 Tab 3   • pantoprazole (PROTONIX) 40 MG Tablet Delayed Response Take 1 tablet by mouth  every day 90 Tab 3   • metoprolol SR (TOPROL XL) 50 MG TABLET SR 24 HR Take 1 Tab by mouth every day. 90 Tab 3   • metformin ER (GLUCOPHAGE XR) 500 MG TABLET SR 24 HR Take 1 Tab by mouth every day. 90 Tab 3   • levothyroxine (SYNTHROID) 75 MCG Tab Take 1 Tab by mouth Every morning on an empty stomach. 90 Tab 3   • atorvastatin (LIPITOR) 40 MG Tab TAKE 1 TABLET BY MOUTH ONCE DAILY IN THE EVENING 90 Tab 1   • levothyroxine (SYNTHROID) 50 MCG Tab Take 1 Tab by mouth Every morning on an empty stomach. 90 Tab 1   • TRUEPLUS LANCETS 28G Misc USE ONCE DAILY AND AS NEEDED FOR SYMPTOMS OF HIGH OR LOW SUGAR 150 Each 3   • meloxicam (MOBIC) 15 MG tablet Take 1 Tab by mouth every day. 30 Tab 3   • levothyroxine (SYNTHROID) 75 MCG Tab Take 1 Tab by mouth Every morning on an empty stomach. 90 Tab 1   • Blood Glucose Monitoring Suppl SUPPLIES Misc Test strips order: Test strips for glucometer. Sig: use before breakfast and prn ssx high or low sugar #50 RF x 5 50 Strip 5   • levothyroxine (SYNTHROID) 88 MCG Tab Take 1 Tab by mouth Every morning on an empty stomach. 90 Tab 3   • alprazolam (XANAX) 0.25 MG Tab Take 1 Tab by mouth 3 times a day as needed for Sleep or Anxiety. 30 Tab 0   • Lancets MISC Lancets order: Lancets for glucometer. Sig: use before breakfast and prn ssx high or low sugar. #50 RF x 5 50 Each 5   • alprazolam (XANAX) 0.25 MG TABS Take 1 Tab by mouth 3 times a day as needed for Sleep or Anxiety. 30 Tab 0   • levothyroxine (SYNTHROID) 100 MCG TABS Take 1 Tab by mouth every day. 90 Tab 3    • triamcinolone acetonide (KENALOG) 0.1 % CREA Apply to affedted areas twice daily. 30 g 1   • pravastatin (PRAVACHOL) 40 MG tablet Take 1 Tab by mouth every evening. 90 Tab 3   • amlodipine (NORVASC) 10 MG TABS Take 1 Tab by mouth every day. 90 Each 3   • atorvastatin (LIPITOR) 20 MG TABS Take 1 Tab by mouth every bedtime. 90 Tab 3   • hydrochlorothiazide (MICROZIDE) 12.5 MG capsule Take 1 Cap by mouth every day. 90 Cap 3   • esomeprazole (NEXIUM) 40 MG capsule Take 1 Cap by mouth every morning before breakfast. 90 Cap 3   • mometasone (NASONEX) 50 MCG/ACT nasal spray Spray 2 Act in nose every day. Each nostril 1 Inhaler 3   • docosahexanoic acid (FISH OIL) 1000 MG CAPS Take 1,000 mg by mouth 2 Times a Day.     • NORVASC 10 MG PO TABS QD       No current facility-administered medications for this visit.        Family History   Problem Relation Age of Onset   • Cancer Mother      esophagus   • Genitourinary () Sister      renal failure on dialysis   • Hyperlipidemia Sister    • Hyperlipidemia Sister        Social History     Social History   • Marital Status:      Spouse Name: N/A   • Number of Children: 3   • Years of Education: N/A     Occupational History   • retired [employer]     Social History Main Topics   • Smoking status: Former Smoker     Quit date: 11/01/1998   • Smokeless tobacco: Never Used      Comment: quit 1998, smoked 1-2 cig/day for 5 yrs   • Alcohol Use: 0.0 oz/week     0 Standard drinks or equivalent per week      Comment: rare   • Drug Use: No   • Sexual Activity:     Partners: Male     Other Topics Concern   • Not on file     Social History Narrative          ROS     Review of systems as per history of present illness, the rest are negative.       Objective:     /80 mmHg  Pulse 62  Temp(Src) 36.7 °C (98.1 °F)  Resp 14  Ht 1.524 m (5')  Wt 66.86 kg (147 lb 6.4 oz)  BMI 28.79 kg/m2  SpO2 96%  LMP 11/01/2006     Physical Exam   Constitutional: She is oriented to person,  place, and time. She appears well-developed and well-nourished. No distress.   HENT:   Head: Normocephalic and atraumatic.   Right Ear: External ear normal.   Left Ear: External ear normal.   Nose: Nose normal.   Mouth/Throat: Oropharynx is clear and moist. No oropharyngeal exudate.   Eyes: Conjunctivae and EOM are normal. Pupils are equal, round, and reactive to light. Right eye exhibits no discharge. Left eye exhibits no discharge. No scleral icterus.   Neck: Normal range of motion. Neck supple. No tracheal deviation present. No thyromegaly present.   Cardiovascular: Normal rate, regular rhythm and normal heart sounds.  Exam reveals no gallop.    No murmur heard.  Pulmonary/Chest: Effort normal and breath sounds normal. No stridor. No respiratory distress. She has no wheezes. She has no rales. Right breast exhibits no inverted nipple, no mass, no nipple discharge, no skin change and no tenderness. Left breast exhibits no inverted nipple, no mass, no nipple discharge, no skin change and no tenderness. Breasts are symmetrical.   Abdominal: Soft. Bowel sounds are normal. She exhibits no distension and no mass. There is no tenderness. There is no rebound and no guarding. Hernia confirmed negative in the right inguinal area and confirmed negative in the left inguinal area.   Genitourinary: Vagina normal and uterus normal. Rectal exam shows no external hemorrhoid, no internal hemorrhoid, no fissure, no mass, no tenderness and anal tone normal. Guaiac negative stool. No breast tenderness, discharge or bleeding. Pelvic exam was performed with patient supine. No labial fusion. There is no rash, tenderness, lesion or injury on the right labia. There is no rash, tenderness, lesion or injury on the left labia. Uterus is not deviated, not enlarged, not fixed and not tender. Cervix exhibits no motion tenderness, no discharge and no friability. Right adnexum displays no mass, no tenderness and no fullness. Left adnexum displays  no mass, no tenderness and no fullness. No tenderness or bleeding in the vagina. No foreign body around the vagina. No vaginal discharge found.   Musculoskeletal: Normal range of motion. She exhibits no edema or tenderness.   Lymphadenopathy:     She has no cervical adenopathy.        Right: No inguinal adenopathy present.        Left: No inguinal adenopathy present.   Neurological: She is alert and oriented to person, place, and time. She displays normal reflexes. No cranial nerve deficit. She exhibits normal muscle tone. Coordination normal.   Skin: Skin is warm. No rash noted. She is not diaphoretic. No erythema. No pallor.   Psychiatric: She has a normal mood and affect. Her behavior is normal. Thought content normal.        No visits with results within 1 Month(s) from this visit.  Latest known visit with results is:    Orders Only on 02/22/2017   Component Date Value   • Glucose 02/22/2017 111*   • Bun 02/22/2017 13    • Creatinine 02/22/2017 0.77    • GFR If Non  Ameri* 02/22/2017 81    • GFR If  02/22/2017 94    • Bun-Creatinine Ratio 02/22/2017 17    • Sodium 02/22/2017 144    • Potassium 02/22/2017 4.1    • Chloride 02/22/2017 103    • Co2 02/22/2017 26    • Calcium 02/22/2017 9.5    • Total Protein 02/22/2017 7.1    • Albumin 02/22/2017 4.5    • Globulin 02/22/2017 2.6    • A-G Ratio 02/22/2017 1.7    • Total Bilirubin 02/22/2017 0.6    • Alkaline Phosphatase 02/22/2017 42    • AST(SGOT) 02/22/2017 16    • ALT(SGPT) 02/22/2017 13    • Cholesterol,Tot 02/22/2017 170    • Triglycerides 02/22/2017 147    • HDL 02/22/2017 56    • VLDL Cholesterol Calc 02/22/2017 29    • LDL 02/22/2017 85    • Comment: 02/22/2017 CANCELED    • Creatinine, Random Urine 02/22/2017 41.4    • Microalbumin, Urine Rand* 02/22/2017 <3.0    • Microalbumin-Creatinine 02/22/2017 <7.2    • Glycohemoglobin 02/22/2017 6.7*   • Free T-4 02/22/2017 1.44    • TSH 02/22/2017 0.465    • Glucose 05/24/2017 119*   • Bun  05/24/2017 18    • Creatinine 05/24/2017 0.89    • GFR If Non  Ameri* 05/24/2017 68    • GFR If  05/24/2017 79    • Bun-Creatinine Ratio 05/24/2017 20    • Sodium 05/24/2017 144    • Potassium 05/24/2017 4.7    • Chloride 05/24/2017 102    • Co2 05/24/2017 22    • Calcium 05/24/2017 9.3    • Total Protein 05/24/2017 6.6    • Albumin 05/24/2017 4.1    • Globulin 05/24/2017 2.5    • A-G Ratio 05/24/2017 1.6    • Total Bilirubin 05/24/2017 0.5    • Alkaline Phosphatase 05/24/2017 41    • AST(SGOT) 05/24/2017 17    • ALT(SGPT) 05/24/2017 13    • Cholesterol,Tot 05/24/2017 194    • Triglycerides 05/24/2017 214*   • HDL 05/24/2017 51    • VLDL Cholesterol Calc 05/24/2017 43*   • LDL 05/24/2017 100*   • Comment: 05/24/2017 CANCELED    • Glycohemoglobin 05/24/2017 7.0*previously 6.7                Assessment/Plan:     1. Encounter for routine gynecological examination with Papanicolaou smear of cervix  Complete exam was done. Pap smear was done. Specimen was packaged and sent to the lab. She will schedule her screening mammogram. She will also schedule screening bone density scan.  - THINPREP PAP WITH HPV; Future    2. Screening for HPV (human papillomavirus)  Screening for HPV done.  - THINPREP PAP WITH HPV; Future    3. Diabetes mellitus without complication (CMS-[facility])  Hemoglobin A1c increased from 6.7-7.0. Advised to increase the metformin to 500 mg twice a day. Recheck blood work when she returns in 5 months. She will go to the [facility] for vacation and will be back in October. Advised watch diet more closely, advised to exercise and lose weight.  - LIPID PROFILE; Future  - COMP METABOLIC PANEL; Future  - HEMOGLOBIN A1C; Future  - CBC WITH DIFFERENTIAL; Future  - TSH; Future    4. Essential hypertension  Controlled on her medications.  - LIPID PROFILE; Future  - COMP METABOLIC PANEL; Future  - HEMOGLOBIN A1C; Future  - CBC WITH DIFFERENTIAL; Future  - TSH; Future    5.  Dyslipidemia  Triglycerides are slightly high. Advised watch the carbohydrates and sweets. HDL at goal. LDL slightly high at 100 and needs to be below 100. Continue lovastatin. Advised work harder with watching the diet such as avoidance of fatty foods, increasing the fruits and vegetables and fish in the diet.  - LIPID PROFILE; Future  - COMP METABOLIC PANEL; Future  - HEMOGLOBIN A1C; Future  - CBC WITH DIFFERENTIAL; Future  - TSH; Future    6. Screening for breast cancer  Patient will schedule screening mammogram.  - MA-SCREEN MAMMO W/CAD-BILAT; Future  - LIPID PROFILE; Future  - COMP METABOLIC PANEL; Future  - HEMOGLOBIN A1C; Future  - CBC WITH DIFFERENTIAL; Future  - TSH; Future    7. Hypothyroidism, unspecified type  We will do follow-up TSH with the next blood work.  - LIPID PROFILE; Future  - COMP METABOLIC PANEL; Future  - HEMOGLOBIN A1C; Future  - CBC WITH DIFFERENTIAL; Future  - TSH; Future    8. Postmenopausal  We will do screening bone density scan.  - DS-BONE DENSITY STUDY (DEXA); Future  - LIPID PROFILE; Future  - COMP METABOLIC PANEL; Future  - HEMOGLOBIN A1C; Future  - CBC WITH DIFFERENTIAL; Future  - TSH; Future      Please note that this dictation was created using voice recognition software. I have worked with consultants from the vendor as well as technical experts from [employer] to optimize the interface. I have made every reasonable attempt to correct obvious errors, but I expect that there are errors of grammar and possibly content I did not discover before finalizing the note.

## 2017-07-03 ENCOUNTER — HOSPITAL ENCOUNTER (OUTPATIENT)
Dept: RADIOLOGY | Facility: MEDICAL CENTER | Age: 67
End: 2017-07-03
Attending: FAMILY MEDICINE
Payer: MEDICARE

## 2017-07-03 DIAGNOSIS — Z78.0 POSTMENOPAUSAL: ICD-10-CM

## 2017-07-03 DIAGNOSIS — Z12.31 VISIT FOR SCREENING MAMMOGRAM: ICD-10-CM

## 2017-07-03 PROCEDURE — 77063 BREAST TOMOSYNTHESIS BI: CPT

## 2017-07-03 PROCEDURE — 77080 DXA BONE DENSITY AXIAL: CPT

## 2017-08-04 ENCOUNTER — PATIENT OUTREACH (OUTPATIENT)
Dept: HEALTH INFORMATION MANAGEMENT | Facility: OTHER | Age: 67
End: 2017-08-04

## 2017-10-02 DIAGNOSIS — E03.9 HYPOTHYROIDISM, UNSPECIFIED TYPE: ICD-10-CM

## 2017-10-02 RX ORDER — LEVOTHYROXINE SODIUM 0.07 MG/1
TABLET ORAL
Qty: 90 TAB | Refills: 3 | Status: SHIPPED | OUTPATIENT
Start: 2017-10-02 | End: 2018-01-24

## 2017-10-11 ENCOUNTER — HOSPITAL ENCOUNTER (OUTPATIENT)
Dept: LAB | Facility: MEDICAL CENTER | Age: 67
End: 2017-10-11
Attending: FAMILY MEDICINE
Payer: MEDICARE

## 2017-10-11 DIAGNOSIS — Z78.0 POSTMENOPAUSAL: ICD-10-CM

## 2017-10-11 DIAGNOSIS — E78.5 DYSLIPIDEMIA: ICD-10-CM

## 2017-10-11 DIAGNOSIS — E03.9 HYPOTHYROIDISM, UNSPECIFIED TYPE: ICD-10-CM

## 2017-10-11 DIAGNOSIS — Z12.39 SCREENING FOR BREAST CANCER: ICD-10-CM

## 2017-10-11 DIAGNOSIS — I10 ESSENTIAL HYPERTENSION: ICD-10-CM

## 2017-10-11 DIAGNOSIS — E11.9 DIABETES MELLITUS WITHOUT COMPLICATION (HCC): ICD-10-CM

## 2017-10-11 LAB — GFR SERPL CREATININE-BSD FRML MDRD: >60 ML/MIN/1.73 M 2

## 2017-10-11 PROCEDURE — 84443 ASSAY THYROID STIM HORMONE: CPT

## 2017-10-11 PROCEDURE — 85025 COMPLETE CBC W/AUTO DIFF WBC: CPT

## 2017-10-11 PROCEDURE — 36415 COLL VENOUS BLD VENIPUNCTURE: CPT

## 2017-10-11 PROCEDURE — 80053 COMPREHEN METABOLIC PANEL: CPT

## 2017-10-11 PROCEDURE — 83036 HEMOGLOBIN GLYCOSYLATED A1C: CPT | Mod: GA

## 2017-10-11 PROCEDURE — 80061 LIPID PANEL: CPT

## 2017-10-12 LAB
ALBUMIN SERPL BCP-MCNC: 4 G/DL (ref 3.2–4.9)
ALBUMIN/GLOB SERPL: 1.5 G/DL
ALP SERPL-CCNC: 43 U/L (ref 30–99)
ALT SERPL-CCNC: 51 U/L (ref 2–50)
ANION GAP SERPL CALC-SCNC: 10 MMOL/L (ref 0–11.9)
AST SERPL-CCNC: 36 U/L (ref 12–45)
BASOPHILS # BLD AUTO: 0.4 % (ref 0–1.8)
BASOPHILS # BLD: 0.03 K/UL (ref 0–0.12)
BILIRUB SERPL-MCNC: 0.5 MG/DL (ref 0.1–1.5)
BUN SERPL-MCNC: 14 MG/DL (ref 8–22)
CALCIUM SERPL-MCNC: 9 MG/DL (ref 8.5–10.5)
CHLORIDE SERPL-SCNC: 106 MMOL/L (ref 96–112)
CHOLEST SERPL-MCNC: 132 MG/DL (ref 100–199)
CO2 SERPL-SCNC: 24 MMOL/L (ref 20–33)
CREAT SERPL-MCNC: 0.81 MG/DL (ref 0.5–1.4)
EOSINOPHIL # BLD AUTO: 0.17 K/UL (ref 0–0.51)
EOSINOPHIL NFR BLD: 2.2 % (ref 0–6.9)
ERYTHROCYTE [DISTWIDTH] IN BLOOD BY AUTOMATED COUNT: 45.8 FL (ref 35.9–50)
EST. AVERAGE GLUCOSE BLD GHB EST-MCNC: 194 MG/DL
GLOBULIN SER CALC-MCNC: 2.7 G/DL (ref 1.9–3.5)
GLUCOSE SERPL-MCNC: 130 MG/DL (ref 65–99)
HBA1C MFR BLD: 8.4 % (ref 0–5.6)
HCT VFR BLD AUTO: 37.7 % (ref 37–47)
HDLC SERPL-MCNC: 41 MG/DL
HGB BLD-MCNC: 12.4 G/DL (ref 12–16)
IMM GRANULOCYTES # BLD AUTO: 0.02 K/UL (ref 0–0.11)
IMM GRANULOCYTES NFR BLD AUTO: 0.3 % (ref 0–0.9)
LDLC SERPL CALC-MCNC: 60 MG/DL
LYMPHOCYTES # BLD AUTO: 2.37 K/UL (ref 1–4.8)
LYMPHOCYTES NFR BLD: 30.1 % (ref 22–41)
MCH RBC QN AUTO: 30.5 PG (ref 27–33)
MCHC RBC AUTO-ENTMCNC: 32.9 G/DL (ref 33.6–35)
MCV RBC AUTO: 92.9 FL (ref 81.4–97.8)
MONOCYTES # BLD AUTO: 0.63 K/UL (ref 0–0.85)
MONOCYTES NFR BLD AUTO: 8 % (ref 0–13.4)
NEUTROPHILS # BLD AUTO: 4.66 K/UL (ref 2–7.15)
NEUTROPHILS NFR BLD: 59 % (ref 44–72)
NRBC # BLD AUTO: 0 K/UL
NRBC BLD AUTO-RTO: 0 /100 WBC
PLATELET # BLD AUTO: 262 K/UL (ref 164–446)
PMV BLD AUTO: 11.3 FL (ref 9–12.9)
POTASSIUM SERPL-SCNC: 3.3 MMOL/L (ref 3.6–5.5)
PROT SERPL-MCNC: 6.7 G/DL (ref 6–8.2)
RBC # BLD AUTO: 4.06 M/UL (ref 4.2–5.4)
SODIUM SERPL-SCNC: 140 MMOL/L (ref 135–145)
TRIGL SERPL-MCNC: 154 MG/DL (ref 0–149)
TSH SERPL DL<=0.005 MIU/L-ACNC: 2.92 UIU/ML (ref 0.3–3.7)
WBC # BLD AUTO: 7.9 K/UL (ref 4.8–10.8)

## 2017-10-17 ENCOUNTER — OFFICE VISIT (OUTPATIENT)
Dept: MEDICAL GROUP | Facility: PHYSICIAN GROUP | Age: 67
End: 2017-10-17
Payer: MEDICARE

## 2017-10-17 VITALS
RESPIRATION RATE: 16 BRPM | BODY MASS INDEX: 30.43 KG/M2 | DIASTOLIC BLOOD PRESSURE: 82 MMHG | SYSTOLIC BLOOD PRESSURE: 128 MMHG | OXYGEN SATURATION: 98 % | TEMPERATURE: 97.9 F | HEART RATE: 87 BPM | HEIGHT: 60 IN | WEIGHT: 155 LBS

## 2017-10-17 DIAGNOSIS — E78.5 DYSLIPIDEMIA: ICD-10-CM

## 2017-10-17 DIAGNOSIS — E66.9 OBESITY (BMI 30-39.9): ICD-10-CM

## 2017-10-17 DIAGNOSIS — E03.9 HYPOTHYROIDISM, UNSPECIFIED TYPE: ICD-10-CM

## 2017-10-17 DIAGNOSIS — Z23 NEED FOR IMMUNIZATION AGAINST INFLUENZA: ICD-10-CM

## 2017-10-17 DIAGNOSIS — I10 ESSENTIAL HYPERTENSION: ICD-10-CM

## 2017-10-17 PROCEDURE — G0008 ADMIN INFLUENZA VIRUS VAC: HCPCS | Performed by: FAMILY MEDICINE

## 2017-10-17 PROCEDURE — 99214 OFFICE O/P EST MOD 30 MIN: CPT | Mod: 25 | Performed by: FAMILY MEDICINE

## 2017-10-17 PROCEDURE — 90662 IIV NO PRSV INCREASED AG IM: CPT | Performed by: FAMILY MEDICINE

## 2017-10-17 ASSESSMENT — PAIN SCALES - GENERAL: PAINLEVEL: NO PAIN

## 2017-10-17 ASSESSMENT — PATIENT HEALTH QUESTIONNAIRE - PHQ9: CLINICAL INTERPRETATION OF PHQ2 SCORE: 0

## 2017-10-18 NOTE — PROGRESS NOTES
Subjective:      Juancho Parrish is a 67 y.o. female who presents with Follow-Up            HPI patient returns for follow-up of her medical problems.    For her diabetes mellitus type 2, she continues to take metformin 500 mg twice daily. She went on vacation to the [facility] and she stayed there for 2 one half months and just came back about 2 weeks ago. She was not very good in watching her diet when she was there. She gained 8 ounces last visit.    In terms of her hypertension, this continues to be under control on losartan prolonged.     For her dyslipidemia, she continues to atorvastatin without myalgias.    She continues to take thyroid replacement for hypothyroidism.    She needs flu shot today.    Past medical history, past surgical history, family history reviewed-no changes    Social history reviewed-no changes    Allergies reviewed-no changes    Medications reviewed-no changes    ROS     Review of systems as per history of present illness, the rest are negative.       Objective:     /82   Pulse 87   Temp 36.6 °C (97.9 °F)   Resp 16   Ht 1.524 m (5')   Wt 70.3 kg (155 lb)   LMP 01/20/2008   SpO2 98%   Breastfeeding? No   BMI 30.27 kg/m²      Physical Exam     Examined alert, awake, oriented, not in distress    Neck-supple, no lymphadenopathy, no thyromegaly  Lungs-clear to auscultation, no rales, no wheezes  Heart-regular rate and rhythm, no murmur  Extremities-no edema, clubbing, cyanosis  Monofilament testing with a 10 gram force: sensation intact: intact bilaterally  Visual Inspection: Feet without maceration, ulcers, fissures.  Pedal pulses: intact bilaterally     Results for JUANCHO PARRISH (MRN [id number]) as of 10/18/2017 07:22   Ref. Range 5/24/2017 09:41 10/11/2017 09:43   Sodium Latest Ref Range: 135 - 145 mmol/L 144 140   Potassium Latest Ref Range: 3.6 - 5.5 mmol/L 4.7 3.3 (L)   Chloride Latest Ref Range: 96 - 112 mmol/L 102 106   Co2 Latest Ref Range: 20 - 33 mmol/L 22 24    Anion Gap Latest Ref Range: 0.0 - 11.9   10.0   Glucose Latest Ref Range: 65 - 99 mg/dL 119 (H) 130 (H)   Bun Latest Ref Range: 8 - 22 mg/dL 18 14   Creatinine Latest Ref Range: 0.50 - 1.40 mg/dL 0.89 0.81   GFR If  Latest Ref Range: >60 mL/min/1.73 m 2 79 >60   GFR If Non  Latest Ref Range: >60 mL/min/1.73 m 2 68 >60   Bun-Creatinine Ratio Latest Ref Range: 12 - 28  20    Calcium Latest Ref Range: 8.5 - 10.5 mg/dL 9.3 9.0   AST(SGOT) Latest Ref Range: 12 - 45 U/L 17 36   ALT(SGPT) Latest Ref Range: 2 - 50 U/L 13 51 (H)   Alkaline Phosphatase Latest Ref Range: 30 - 99 U/L 41 43   Total Bilirubin Latest Ref Range: 0.1 - 1.5 mg/dL 0.5 0.5   Albumin Latest Ref Range: 3.2 - 4.9 g/dL 4.1 4.0   Total Protein Latest Ref Range: 6.0 - 8.2 g/dL 6.6 6.7   Globulin Latest Ref Range: 1.9 - 3.5 g/dL 2.5 2.7   A-G Ratio Latest Units: g/dL 1.6 1.5   Glycohemoglobin Latest Ref Range: 0.0 - 5.6 % 7.0 (H) 8.4 (H)   Estim. Avg Glu Latest Units: mg/dL  194   Cholesterol,Tot Latest Ref Range: 100 - 199 mg/dL 194 132   Triglycerides Latest Ref Range: 0 - 149 mg/dL 214 (H) 154 (H)   HDL Latest Ref Range: >=40 mg/dL 51 41   LDL Latest Ref Range: <100 mg/dL 100 (H) 60   VLDL Cholesterol Calc Latest Ref Range: 5 - 40 mg/dL 43 (H)    Comment: Unknown CANCELED       Results for JUANCHO PARRISH (MRN [id number]) as of 10/18/2017 07:22   Ref. Range 9/26/2016 13:51 10/11/2017 09:43   WBC Latest Ref Range: 4.8 - 10.8 K/uL 5.2 7.9   RBC Latest Ref Range: 4.20 - 5.40 M/uL 4.68 4.06 (L)   Hemoglobin Latest Ref Range: 12.0 - 16.0 g/dL 14.0 12.4   Hematocrit Latest Ref Range: 37.0 - 47.0 % 43.3 37.7   MCV Latest Ref Range: 81.4 - 97.8 fL 92.5 92.9   MCH Latest Ref Range: 27.0 - 33.0 pg 29.9 30.5   MCHC Latest Ref Range: 33.6 - 35.0 g/dL 32.3 (L) 32.9 (L)   RDW Latest Ref Range: 35.9 - 50.0 fL 44.7 45.8   Platelet Count Latest Ref Range: 164 - 446 K/uL 285 262   MPV Latest Ref Range: 9.0 - 12.9 fL 11.0 11.3    Neutrophils-Polys Latest Ref Range: 44.00 - 72.00 % 52.00 59.00   Neutrophils (Absolute) Latest Ref Range: 2.00 - 7.15 K/uL 2.71 4.66   Lymphocytes Latest Ref Range: 22.00 - 41.00 % 40.60 30.10   Lymphs (Absolute) Latest Ref Range: 1.00 - 4.80 K/uL 2.11 2.37   Monocytes Latest Ref Range: 0.00 - 13.40 % 5.40 8.00   Monos (Absolute) Latest Ref Range: 0.00 - 0.85 K/uL 0.28 0.63   Eosinophils Latest Ref Range: 0.00 - 6.90 % 0.80 2.20   Eos (Absolute) Latest Ref Range: 0.00 - 0.51 K/uL 0.04 0.17   Basophils Latest Ref Range: 0.00 - 1.80 % 1.00 0.40   Baso (Absolute) Latest Ref Range: 0.00 - 0.12 K/uL 0.05 0.03   Immature Granulocytes Latest Ref Range: 0.00 - 0.90 % 0.20 0.30   Immature Granulocytes (abs) Latest Ref Range: 0.00 - 0.11 K/uL 0.01 0.02   Nucleated RBC Latest Units: /100 WBC 0.00 0.00   NRBC (Absolute) Latest Units: K/uL 0.00 0.00     Results for JUANCHO PARRISH (MRN [id number]) as of 10/18/2017 07:22   Ref. Range 2/22/2017 10:28 5/30/2017 15:40 10/11/2017 09:43   TSH Latest Ref Range: 0.300 - 3.700 uIU/mL 0.465  2.920   Free T-4 Latest Ref Range: 0.82 - 1.77 ng/dL 1.44       Assessment/Plan:     1. Uncontrolled type 2 diabetes mellitus without complication, with long-term current use of insulin (CMS-[facility])  This is now out of control with hemoglobin A1c increased from 7.0-8.4. I told the patient we need to get this better controlled with diet, exercise, weight loss. I also told her when to increase the dose of the metformin. She does not want me to increase the dose at this time. She said she will work on it to get this better in the next 3 months. If there is no improvement at that time we will increase the dose. Recheck in 3 months with blood work at that time.  - LIPID PROFILE; Future  - COMP METABOLIC PANEL; Future  - HEMOGLOBIN A1C; Future  - MICROALBUMIN CREAT RATIO URINE; Future  - Diabetic Monofilament Lower Extremity Exam    2. Essential hypertension  Controlled on her medications.  - LIPID  PROFILE; Future  - COMP METABOLIC PANEL; Future  - HEMOGLOBIN A1C; Future  - MICROALBUMIN CREAT RATIO URINE; Future    3. Hypothyroidism, unspecified type  Adequately replaced. Continue the same dose of thyroid medication.  - LIPID PROFILE; Future  - COMP METABOLIC PANEL; Future  - HEMOGLOBIN A1C; Future  - MICROALBUMIN CREAT RATIO URINE; Future    4. Obesity (BMI 30-39.9)  Discussed diet, exercise and weight loss.  - Patient identified as having weight management issue.  Appropriate orders and counseling given.    5. Need for immunization against influenza  High-dose flu shot was given.  - INFLUENZA VACCINE, HIGH DOSE (65+ ONLY)    6. Dyslipidemia   triglycerides went down to almost normal level. LDL also improved and went down to goal. HDL is at the right level. Continue cholesterol medication.      Please note that this dictation was created using voice recognition software. I have worked with consultants from the vendor as well as technical experts from [employer] to optimize the interface. I have made every reasonable attempt to correct obvious errors, but I expect that there are errors of grammar and possibly content I did not discover before finalizing the note.

## 2018-01-02 DIAGNOSIS — E78.5 DYSLIPIDEMIA: ICD-10-CM

## 2018-01-02 RX ORDER — LANCETS 28 GAUGE
EACH MISCELLANEOUS
Qty: 100 EACH | Refills: 0 | Status: SHIPPED | OUTPATIENT
Start: 2018-01-02 | End: 2018-01-09 | Stop reason: SDUPTHER

## 2018-01-02 RX ORDER — ATORVASTATIN CALCIUM 40 MG/1
TABLET, FILM COATED ORAL
Qty: 90 TAB | Refills: 0 | Status: SHIPPED | OUTPATIENT
Start: 2018-01-02 | End: 2018-01-24

## 2018-01-09 DIAGNOSIS — E11.9 DIABETES MELLITUS WITHOUT COMPLICATION (HCC): ICD-10-CM

## 2018-01-09 RX ORDER — LANCETS 28 GAUGE
EACH MISCELLANEOUS
Qty: 100 EACH | Refills: 5 | Status: SHIPPED | OUTPATIENT
Start: 2018-01-09 | End: 2023-02-07

## 2018-01-16 ENCOUNTER — HOSPITAL ENCOUNTER (OUTPATIENT)
Dept: LAB | Facility: MEDICAL CENTER | Age: 68
End: 2018-01-16
Attending: FAMILY MEDICINE
Payer: MEDICARE

## 2018-01-16 DIAGNOSIS — I10 ESSENTIAL HYPERTENSION: ICD-10-CM

## 2018-01-16 DIAGNOSIS — E03.9 HYPOTHYROIDISM, UNSPECIFIED TYPE: ICD-10-CM

## 2018-01-16 LAB
ALBUMIN SERPL BCP-MCNC: 4.6 G/DL (ref 3.2–4.9)
ALBUMIN/GLOB SERPL: 1.7 G/DL
ALP SERPL-CCNC: 34 U/L (ref 30–99)
ALT SERPL-CCNC: 23 U/L (ref 2–50)
ANION GAP SERPL CALC-SCNC: 9 MMOL/L (ref 0–11.9)
AST SERPL-CCNC: 22 U/L (ref 12–45)
BILIRUB SERPL-MCNC: 0.7 MG/DL (ref 0.1–1.5)
BUN SERPL-MCNC: 14 MG/DL (ref 8–22)
CALCIUM SERPL-MCNC: 9.8 MG/DL (ref 8.5–10.5)
CHLORIDE SERPL-SCNC: 103 MMOL/L (ref 96–112)
CHOLEST SERPL-MCNC: 163 MG/DL (ref 100–199)
CO2 SERPL-SCNC: 28 MMOL/L (ref 20–33)
CREAT SERPL-MCNC: 0.92 MG/DL (ref 0.5–1.4)
CREAT UR-MCNC: 139 MG/DL
EST. AVERAGE GLUCOSE BLD GHB EST-MCNC: 183 MG/DL
GLOBULIN SER CALC-MCNC: 2.7 G/DL (ref 1.9–3.5)
GLUCOSE SERPL-MCNC: 114 MG/DL (ref 65–99)
HBA1C MFR BLD: 8 % (ref 0–5.6)
HDLC SERPL-MCNC: 49 MG/DL
LDLC SERPL CALC-MCNC: 72 MG/DL
MICROALBUMIN UR-MCNC: <0.7 MG/DL
MICROALBUMIN/CREAT UR: NORMAL MG/G (ref 0–30)
POTASSIUM SERPL-SCNC: 4.6 MMOL/L (ref 3.6–5.5)
PROT SERPL-MCNC: 7.3 G/DL (ref 6–8.2)
SODIUM SERPL-SCNC: 140 MMOL/L (ref 135–145)
TRIGL SERPL-MCNC: 212 MG/DL (ref 0–149)

## 2018-01-16 PROCEDURE — 36415 COLL VENOUS BLD VENIPUNCTURE: CPT

## 2018-01-16 PROCEDURE — 80061 LIPID PANEL: CPT

## 2018-01-16 PROCEDURE — 82570 ASSAY OF URINE CREATININE: CPT

## 2018-01-16 PROCEDURE — 82043 UR ALBUMIN QUANTITATIVE: CPT

## 2018-01-16 PROCEDURE — 80053 COMPREHEN METABOLIC PANEL: CPT

## 2018-01-16 PROCEDURE — 83036 HEMOGLOBIN GLYCOSYLATED A1C: CPT | Mod: GA

## 2018-01-23 ENCOUNTER — OFFICE VISIT (OUTPATIENT)
Dept: MEDICAL GROUP | Facility: PHYSICIAN GROUP | Age: 68
End: 2018-01-23
Payer: MEDICARE

## 2018-01-23 VITALS
DIASTOLIC BLOOD PRESSURE: 60 MMHG | OXYGEN SATURATION: 93 % | WEIGHT: 153.88 LBS | HEIGHT: 60 IN | HEART RATE: 67 BPM | SYSTOLIC BLOOD PRESSURE: 110 MMHG | TEMPERATURE: 98.2 F | BODY MASS INDEX: 30.21 KG/M2

## 2018-01-23 DIAGNOSIS — I10 ESSENTIAL HYPERTENSION: ICD-10-CM

## 2018-01-23 DIAGNOSIS — E03.9 HYPOTHYROIDISM, UNSPECIFIED TYPE: ICD-10-CM

## 2018-01-23 DIAGNOSIS — E78.5 DYSLIPIDEMIA: ICD-10-CM

## 2018-01-23 PROCEDURE — 99214 OFFICE O/P EST MOD 30 MIN: CPT | Performed by: FAMILY MEDICINE

## 2018-01-24 NOTE — PROGRESS NOTES
Subjective:      Juancho Parrish is a 67 y.o. female who presents with Gastrophageal Reflux; Finger Pain; and Results (labs)            HPI     Patient returns for follow-up of her medical problems.    3 months ago, her hemoglobin A1c increased from 7.0-8.4. She did not want me to increase the dose of the metformin and she continues to take 500 mg one tablet twice a day. She has not been very good in watching her diet especially during the holidays. Blood work was done before this visit.    For her hypertension, this is under control on losartan and metoprolol.    She continues to take thyroid replacement for hypothyroidism.    Past medical history, past surgical history, family history reviewed-no changes    Social history reviewed-no changes    Allergies reviewed-no changes    Medications reviewed-no changes    ROS     Review of systems as per history of present illness, the rest are negative.       Objective:     /60   Pulse 67   Temp 36.8 °C (98.2 °F)   Ht 1.524 m (5')   Wt 69.8 kg (153 lb 14.1 oz)   LMP 01/20/2008   SpO2 93%   BMI 30.05 kg/m²      Physical Exam      Examined alert, awake, oriented, not in distress    Neck-supple, no lymphadenopathy, no thyromegaly  Lungs-clear to auscultation, no rales, no wheezes  Heart-regular rate and rhythm, no murmur  Extremities-no edema, clubbing, cyanosis     Results for JUANCHO PARRISH (MRN [id number]) as of 1/24/2018 12:54   Ref. Range 10/11/2017 09:43 1/16/2018 09:38   Sodium Latest Ref Range: 135 - 145 mmol/L 140 140   Potassium Latest Ref Range: 3.6 - 5.5 mmol/L 3.3 (L) 4.6   Chloride Latest Ref Range: 96 - 112 mmol/L 106 103   Co2 Latest Ref Range: 20 - 33 mmol/L 24 28   Anion Gap Latest Ref Range: 0.0 - 11.9  10.0 9.0   Glucose Latest Ref Range: 65 - 99 mg/dL 130 (H) 114 (H)   Bun Latest Ref Range: 8 - 22 mg/dL 14 14   Creatinine Latest Ref Range: 0.50 - 1.40 mg/dL 0.81 0.92   GFR If  Latest Ref Range: >60 mL/min/1.73 m 2 >60 >60    GFR If Non  Latest Ref Range: >60 mL/min/1.73 m 2 >60 >60   Calcium Latest Ref Range: 8.5 - 10.5 mg/dL 9.0 9.8   AST(SGOT) Latest Ref Range: 12 - 45 U/L 36 22   ALT(SGPT) Latest Ref Range: 2 - 50 U/L 51 (H) 23   Alkaline Phosphatase Latest Ref Range: 30 - 99 U/L 43 34   Total Bilirubin Latest Ref Range: 0.1 - 1.5 mg/dL 0.5 0.7   Albumin Latest Ref Range: 3.2 - 4.9 g/dL 4.0 4.6   Total Protein Latest Ref Range: 6.0 - 8.2 g/dL 6.7 7.3   Globulin Latest Ref Range: 1.9 - 3.5 g/dL 2.7 2.7   A-G Ratio Latest Units: g/dL 1.5 1.7   Glycohemoglobin Latest Ref Range: 0.0 - 5.6 % 8.4 (H) 8.0 (H)   Estim. Avg Glu Latest Units: mg/dL 194 183   Cholesterol,Tot Latest Ref Range: 100 - 199 mg/dL 132 163   Triglycerides Latest Ref Range: 0 - 149 mg/dL 154 (H) 212 (H)   HDL Latest Ref Range: >=40 mg/dL 41 49   LDL Latest Ref Range: <100 mg/dL 60 72   Micro Alb Creat Ratio Latest Ref Range: 0 - 30 mg/g  see below   Creatinine, Urine Latest Units: mg/dL  139.00   Microalbumin, Urine Random Latest Units: mg/dL  <0.7        Assessment/Plan:     1. Uncontrolled diabetes mellitus type 2 without complications, unspecified long term insulin use status (CMS-[facility])  This is only slightly better with hemoglobin A1c down from 8.4-8.0. We need to increase the dose of the metformin so we can get this better controlled to avoid complications of uncontrolled diabetes. Increase metformin to 500 mg 3 times a day with meals. Advised to work harder on watching the diet with avoidance of sweets, decreasing the carbohydrates, observing a diabetic diet well, regular exercise and weight loss. Recheck blood work in 3 months.  - LIPID PROFILE; Future  - COMP METABOLIC PANEL; Future  - TSH; Future  - HEMOGLOBIN A1C; Future  - metformin (GLUCOPHAGE) 500 MG Tab; Take 1 Tab by mouth 3 times a day, with meals.  Dispense: 270 Tab; Refill: 1    2. Dyslipidemia  All at target except for triglycerides are elevated and higher than before. This could be  related to uncontrolled diabetes. Advised to work on the diet in terms of avoidance of sweets and decreasing the carbohydrates. Exercise regularly and lose weight. Continue atorvastatin. Recheck lipid panel in 3 months.  - LIPID PROFILE; Future  - COMP METABOLIC PANEL; Future  - TSH; Future  - HEMOGLOBIN A1C; Future    3. Essential hypertension  Blood pressure is under control on metoprolol and losartan.  - LIPID PROFILE; Future  - COMP METABOLIC PANEL; Future  - TSH; Future  - HEMOGLOBIN A1C; Future    4. Hypothyroidism, unspecified type  The last TSH was adequately replaced in October 2017. Continue the same dose of levothyroxine and recheck TSH next visit.  - LIPID PROFILE; Future  - COMP METABOLIC PANEL; Future  - TSH; Future  - HEMOGLOBIN A1C; Future      Please note that this dictation was created using voice recognition software. I have worked with consultants from the vendor as well as technical experts from [employer] to optimize the interface. I have made every reasonable attempt to correct obvious errors, but I expect that there are errors of grammar and possibly content I did not discover before finalizing the note.

## 2018-02-05 ENCOUNTER — TELEPHONE (OUTPATIENT)
Dept: MEDICAL GROUP | Facility: PHYSICIAN GROUP | Age: 68
End: 2018-02-05

## 2018-02-06 ENCOUNTER — TELEPHONE (OUTPATIENT)
Dept: MEDICAL GROUP | Facility: PHYSICIAN GROUP | Age: 68
End: 2018-02-06

## 2018-02-06 NOTE — TELEPHONE ENCOUNTER
Patient notified that her RX was sent to Pharmacy and to start taking Metformin 500 mg TID with meals

## 2018-03-26 DIAGNOSIS — E78.5 DYSLIPIDEMIA: ICD-10-CM

## 2018-03-26 DIAGNOSIS — F33.9 RECURRENT MAJOR DEPRESSIVE DISORDER, REMISSION STATUS UNSPECIFIED (HCC): ICD-10-CM

## 2018-03-26 DIAGNOSIS — I10 ESSENTIAL HYPERTENSION: ICD-10-CM

## 2018-03-26 RX ORDER — ATORVASTATIN CALCIUM 40 MG/1
TABLET, FILM COATED ORAL
Qty: 90 TAB | Refills: 3 | Status: SHIPPED | OUTPATIENT
Start: 2018-03-26 | End: 2019-01-14 | Stop reason: SDUPTHER

## 2018-03-26 RX ORDER — METOPROLOL SUCCINATE 50 MG/1
TABLET, EXTENDED RELEASE ORAL
Qty: 90 TAB | Refills: 3 | Status: SHIPPED | OUTPATIENT
Start: 2018-03-26 | End: 2018-08-28

## 2018-03-26 RX ORDER — ESCITALOPRAM OXALATE 10 MG/1
TABLET ORAL
Qty: 90 TAB | Refills: 3 | Status: SHIPPED | OUTPATIENT
Start: 2018-03-26 | End: 2019-03-04 | Stop reason: SDUPTHER

## 2018-03-26 RX ORDER — LOSARTAN POTASSIUM 50 MG/1
TABLET ORAL
Qty: 90 TAB | Refills: 3 | Status: SHIPPED | OUTPATIENT
Start: 2018-03-26 | End: 2019-02-28 | Stop reason: SDUPTHER

## 2018-03-30 DIAGNOSIS — E11.9 DIABETES MELLITUS WITHOUT COMPLICATION (HCC): ICD-10-CM

## 2018-03-30 RX ORDER — METFORMIN HYDROCHLORIDE 500 MG/1
TABLET, EXTENDED RELEASE ORAL
Qty: 90 TAB | Refills: 3 | Status: SHIPPED | OUTPATIENT
Start: 2018-03-30 | End: 2018-08-28

## 2018-04-18 LAB
ALBUMIN SERPL-MCNC: 4.2 G/DL (ref 3.6–4.8)
ALBUMIN/GLOB SERPL: 1.4 {RATIO} (ref 1.2–2.2)
ALP SERPL-CCNC: 50 IU/L (ref 39–117)
ALT SERPL-CCNC: 25 IU/L (ref 0–32)
AST SERPL-CCNC: 21 IU/L (ref 0–40)
BILIRUB SERPL-MCNC: 0.4 MG/DL (ref 0–1.2)
BUN SERPL-MCNC: 19 MG/DL (ref 8–27)
BUN/CREAT SERPL: 20 (ref 12–28)
CALCIUM SERPL-MCNC: 9.7 MG/DL (ref 8.7–10.3)
CHLORIDE SERPL-SCNC: 97 MMOL/L (ref 96–106)
CHOLEST SERPL-MCNC: 166 MG/DL (ref 100–199)
CO2 SERPL-SCNC: 26 MMOL/L (ref 18–29)
CREAT SERPL-MCNC: 0.95 MG/DL (ref 0.57–1)
GFR SERPLBLD CREATININE-BSD FMLA CKD-EPI: 63 ML/MIN/1.73
GFR SERPLBLD CREATININE-BSD FMLA CKD-EPI: 72 ML/MIN/1.73
GLOBULIN SER CALC-MCNC: 3 G/DL (ref 1.5–4.5)
GLUCOSE SERPL-MCNC: 141 MG/DL (ref 65–99)
HBA1C MFR BLD: 7.9 % (ref 4.8–5.6)
HDLC SERPL-MCNC: 47 MG/DL
LABORATORY COMMENT REPORT: ABNORMAL
LDLC SERPL CALC-MCNC: 81 MG/DL (ref 0–99)
POTASSIUM SERPL-SCNC: 4.7 MMOL/L (ref 3.5–5.2)
PROT SERPL-MCNC: 7.2 G/DL (ref 6–8.5)
SODIUM SERPL-SCNC: 139 MMOL/L (ref 134–144)
TRIGL SERPL-MCNC: 188 MG/DL (ref 0–149)
TSH SERPL DL<=0.005 MIU/L-ACNC: 1.68 UIU/ML (ref 0.45–4.5)
VLDLC SERPL CALC-MCNC: 38 MG/DL (ref 5–40)

## 2018-04-23 ENCOUNTER — TELEPHONE (OUTPATIENT)
Dept: MEDICAL GROUP | Facility: PHYSICIAN GROUP | Age: 68
End: 2018-04-23

## 2018-04-23 NOTE — TELEPHONE ENCOUNTER
ESTABLISHED PATIENT PRE-VISIT PLANNING     Note: Patient will not be contacted if there is no indication to call.     1.  Reviewed notes from the last few office visits within the medical group: Yes    2.  If any orders were placed at last visit or intended to be done for this visit (i.e. 6 mos follow-up), do we have Results/Consult Notes?        •  Labs - Labs ordered, completed on 04/17/18 and results are in chart.   Note: If patient appointment is for lab review and patient did not complete labs, check with provider if OK to reschedule patient until labs completed.       •  Imaging - Imaging ordered, completed and results are in chart.       •  Referrals - No referrals were ordered at last office visit.    3. Is this appointment scheduled as a Hospital Follow-Up? No    4.  Immunizations were updated in [employer] using WebIZ?: Yes       •  Web Iz Recommendations: TD and ZOSTAVAX (Shingles)    5.  Patient is due for the following Health Maintenance Topics:   Health Maintenance Due   Topic Date Due   • Annual Wellness Visit  1950       - Patient has completed FLU, PNEUMOVAX (PPSV23), PREVNAR (PCV13)  and TDAP Immunization(s) per WebIZ. Chart has been updated.    6.  MDX printed for Provider? NO    7.  Patient was NOT informed to arrive 15 min prior to their scheduled appointment and bring in their medication bottles.

## 2018-04-24 ENCOUNTER — OFFICE VISIT (OUTPATIENT)
Dept: MEDICAL GROUP | Facility: PHYSICIAN GROUP | Age: 68
End: 2018-04-24
Payer: MEDICARE

## 2018-04-24 VITALS
OXYGEN SATURATION: 95 % | SYSTOLIC BLOOD PRESSURE: 124 MMHG | DIASTOLIC BLOOD PRESSURE: 72 MMHG | TEMPERATURE: 98 F | HEIGHT: 60 IN | RESPIRATION RATE: 14 BRPM | BODY MASS INDEX: 30.43 KG/M2 | HEART RATE: 78 BPM | WEIGHT: 155 LBS

## 2018-04-24 DIAGNOSIS — E78.5 DYSLIPIDEMIA: ICD-10-CM

## 2018-04-24 DIAGNOSIS — E03.9 HYPOTHYROIDISM, UNSPECIFIED TYPE: ICD-10-CM

## 2018-04-24 DIAGNOSIS — K21.9 GASTROESOPHAGEAL REFLUX DISEASE, ESOPHAGITIS PRESENCE NOT SPECIFIED: ICD-10-CM

## 2018-04-24 DIAGNOSIS — I10 ESSENTIAL HYPERTENSION: ICD-10-CM

## 2018-04-24 DIAGNOSIS — Z23 NEED FOR SHINGLES VACCINE: ICD-10-CM

## 2018-04-24 PROCEDURE — 99214 OFFICE O/P EST MOD 30 MIN: CPT | Performed by: FAMILY MEDICINE

## 2018-04-24 RX ORDER — LANCETS 30 GAUGE
EACH MISCELLANEOUS
Qty: 150 EACH | Refills: 3 | Status: SHIPPED | OUTPATIENT
Start: 2018-04-24 | End: 2019-01-14 | Stop reason: SDUPTHER

## 2018-04-24 RX ORDER — PANTOPRAZOLE SODIUM 40 MG/1
TABLET, DELAYED RELEASE ORAL
Qty: 90 TAB | Refills: 3 | Status: SHIPPED | OUTPATIENT
Start: 2018-04-24 | End: 2019-07-29 | Stop reason: SDUPTHER

## 2018-04-26 NOTE — PROGRESS NOTES
Subjective:      Juancho aPrrish is a 67 y.o. female who presents with Results (labs 3 month follow up )            HPI     Patient returns for follow-up of her medical problems.    In terms of her diabetes mellitus type 2, she is supposed to be taking metformin 500 mg 3 times a day but she said she has not been taking it regularly 3 times a day and most of the time only twice a day. Blood work was done before this visit.    She continues to take atorvastatin for dyslipidemia without myalgias.    Blood pressure is under control on metoprolol and losartan.    She continues to take thyroid replacement or hypothyroidism.    In terms of her GERD, she said pantoprazole is the one that works the best for her so she has been taking this regularly on a daily basis.    Past medical history, past surgical history, family history reviewed-no changes    Social history reviewed-no changes    Allergies reviewed-no changes    Medications reviewed-no changes    ROS     As per history of present illness, the rest are negative.       Objective:     /72   Pulse 78   Temp 36.7 °C (98 °F)   Resp 14   Ht 1.524 m (5')   Wt 70.3 kg (155 lb)   LMP 01/20/2008   SpO2 95%   Breastfeeding? No   BMI 30.27 kg/m²      Physical Exam     Examined alert, awake, oriented, not in distress    Neck-supple, no lymphadenopathy, no thyromegaly  Lungs-clear to auscultation, no rales, no wheezes  Heart-regular rate and rhythm, no murmur  Extremities-no edema, clubbing, cyanosis       Results for JUANCHO PARRISH (MRN [id number]) as of 4/25/2018 17:41   Ref. Range 1/16/2018 09:38 4/17/2018 10:02   Sodium Latest Ref Range: 134 - 144 mmol/L 140 139   Potassium Latest Ref Range: 3.5 - 5.2 mmol/L 4.6 4.7   Chloride Latest Ref Range: 96 - 106 mmol/L 103 97   Co2 Latest Ref Range: 18 - 29 mmol/L 28 26   Anion Gap Latest Ref Range: 0.0 - 11.9  9.0    Glucose Latest Ref Range: 65 - 99 mg/dL 114 (H) 141 (H)   Bun Latest Ref Range: 8 - 27 mg/dL 14 19    Creatinine Latest Ref Range: 0.57 - 1.00 mg/dL 0.92 0.95   GFR If  Latest Ref Range: >59 mL/min/1.73 >60 72   GFR If Non  Latest Ref Range: >59 mL/min/1.73 >60 63   Bun-Creatinine Ratio Latest Ref Range: 12 - 28   20   Calcium Latest Ref Range: 8.7 - 10.3 mg/dL 9.8 9.7   AST(SGOT) Latest Ref Range: 0 - 40 IU/L 22 21   ALT(SGPT) Latest Ref Range: 0 - 32 IU/L 23 25   Alkaline Phosphatase Latest Ref Range: 39 - 117 IU/L 34 50   Total Bilirubin Latest Ref Range: 0.0 - 1.2 mg/dL 0.7 0.4   Albumin Latest Ref Range: 3.6 - 4.8 g/dL 4.6 4.2   Total Protein Latest Ref Range: 6.0 - 8.5 g/dL 7.3 7.2   Globulin Latest Ref Range: 1.5 - 4.5 g/dL 2.7 3.0   A-G Ratio Latest Ref Range: 1.2 - 2.2  1.7 1.4   Glycohemoglobin Latest Ref Range: 4.8 - 5.6 % 8.0 (H) 7.9 (H)   Estim. Avg Glu Latest Units: mg/dL 183    Cholesterol,Tot Latest Ref Range: 100 - 199 mg/dL 163 166   Triglycerides Latest Ref Range: 0 - 149 mg/dL 212 (H) 188 (H)   HDL Latest Ref Range: >39 mg/dL 49 47   LDL Latest Ref Range: 0 - 99 mg/dL 72 81   VLDL Cholesterol Calc Latest Ref Range: 5 - 40 mg/dL  38   Comment: Unknown  CANCELED   Micro Alb Creat Ratio Latest Ref Range: 0 - 30 mg/g see below    Creatinine, Urine Latest Units: mg/dL 139.00    Microalbumin, Urine Random Latest Units: mg/dL <0.7    Results for JUANCHO PARRISH (MRN [id number]) as of 4/25/2018 17:41   Ref. Range 10/11/2017 09:43 4/17/2018 10:02   TSH Latest Ref Range: 0.450 - 4.500 uIU/mL 2.920 1.680        Assessment/Plan:     1. Uncontrolled type 2 diabetes mellitus without complication, without long-term current use of insulin (CMS-[facility])  Diabetes improved but only slightly. This is still not adequately controlled. She has problems taking the metformin 3 times a day so I will switch to 1000 mg one tablet twice a day with meals. Advised to watch her diet more closely, exercise regularly and lose weight. We will do blood work next visit.  - metformin (GLUCOPHAGE) 1000  MG tablet; Take 1 Tab by mouth 2 times a day, with meals.  Dispense: 180 Tab; Refill: 3  - Blood Glucose Monitoring Suppl Device; Meter: Dispense Device of Insurance Preference. Sig. Use as directed for blood sugar monitoring. #1. NR.  Dispense: 1 Device; Refill: 0  - Lancets Misc; Lancets for glucometer. Sig: use once before breakfst and prn ssx high or low sugar.  Dispense: 150 Each; Refill: 3  - Blood Glucose Monitoring Suppl Supplies Misc; Test strips order: Test strips for glucometer. Sig: use once before and prn ssx high or low sugar #150 RF x 3  Dispense: 150 Strip; Refill: 3  - LIPID PROFILE; Future  - COMP METABOLIC PANEL; Future  - HEMOGLOBIN A1C; Future    2. Essential hypertension  Controlled on her medications.  - LIPID PROFILE; Future  - COMP METABOLIC PANEL; Future  - HEMOGLOBIN A1C; Future    3. Dyslipidemia  Triglycerides improved and closer to goal. HDL and LDL at goal. Continue cholesterol medication.  - LIPID PROFILE; Future  - COMP METABOLIC PANEL; Future  - HEMOGLOBIN A1C; Future    4. Hypothyroidism, unspecified type  Adequately replaced. Continue the same dose of thyroid medication.    5. Gastroesophageal reflux disease, esophagitis presence not specified  Advised to try to take the pantoprazole every other day or on as-needed basis. Made aware of potential long-term problems with PPI.  - pantoprazole (PROTONIX) 40 MG Tablet Delayed Response; Take 1 tablet by mouth  every day  Dispense: 90 Tab; Refill: 3    6. Need for shingles vaccine  Discussed the new shingles vaccine  shingrix and she agrees to proceed. She was given shingles vaccine.  - Zoster Vac Recomb Adjuvanted (SHINGRIX) 50 MCG Recon Susp; 0.5 mL by Intramuscular route Once for 1 dose.  Dispense: 0.5 mL; Refill: 0    7. BMI 30.0-30.9,adult  Discussed diet, exercise and weight loss.  - Patient identified as having weight management issue.  Appropriate orders and counseling given.      Please note that this dictation was created using  voice recognition software. I have worked with consultants from the vendor as well as technical experts from [facility] to optimize the interface. I have made every reasonable attempt to correct obvious errors, but I expect that there are errors of grammar and possibly content I did not discover before finalizing the note.

## 2018-08-20 ENCOUNTER — TELEPHONE (OUTPATIENT)
Dept: MEDICAL GROUP | Facility: PHYSICIAN GROUP | Age: 68
End: 2018-08-20

## 2018-08-21 LAB
ALBUMIN SERPL-MCNC: 4.6 G/DL (ref 3.6–4.8)
ALBUMIN/GLOB SERPL: 1.6 {RATIO} (ref 1.2–2.2)
ALP SERPL-CCNC: 48 IU/L (ref 39–117)
ALT SERPL-CCNC: 51 IU/L (ref 0–32)
AST SERPL-CCNC: 37 IU/L (ref 0–40)
BILIRUB SERPL-MCNC: 0.6 MG/DL (ref 0–1.2)
BUN SERPL-MCNC: 10 MG/DL (ref 8–27)
BUN/CREAT SERPL: 11 (ref 12–28)
CALCIUM SERPL-MCNC: 10.1 MG/DL (ref 8.7–10.3)
CHLORIDE SERPL-SCNC: 99 MMOL/L (ref 96–106)
CHOLEST SERPL-MCNC: 159 MG/DL (ref 100–199)
CO2 SERPL-SCNC: 24 MMOL/L (ref 20–29)
CREAT SERPL-MCNC: 0.9 MG/DL (ref 0.57–1)
GLOBULIN SER CALC-MCNC: 2.8 G/DL (ref 1.5–4.5)
GLUCOSE SERPL-MCNC: 119 MG/DL (ref 65–99)
HBA1C MFR BLD: 8.4 % (ref 4.8–5.6)
HDLC SERPL-MCNC: 55 MG/DL
IF AFRICAN AMERICAN  100797: 77 ML/MIN/1.73
IF NON AFRICAN AMER 100791: 67 ML/MIN/1.73
LABORATORY COMMENT REPORT: NORMAL
LDLC SERPL CALC-MCNC: 75 MG/DL (ref 0–99)
POTASSIUM SERPL-SCNC: 4.4 MMOL/L (ref 3.5–5.2)
PROT SERPL-MCNC: 7.4 G/DL (ref 6–8.5)
SODIUM SERPL-SCNC: 140 MMOL/L (ref 134–144)
TRIGL SERPL-MCNC: 145 MG/DL (ref 0–149)
VLDLC SERPL CALC-MCNC: 29 MG/DL (ref 5–40)

## 2018-08-26 ENCOUNTER — PATIENT OUTREACH (OUTPATIENT)
Dept: HEALTH INFORMATION MANAGEMENT | Facility: OTHER | Age: 68
End: 2018-08-26

## 2018-08-26 NOTE — PROGRESS NOTES
Outcome: Left Message    Please transfer to Patient Outreach Team at [phone] when patient returns call.    WebIZ Checked & Epic Updated:  yes    [facility] Verified: no    Attempt # 1

## 2018-08-27 ENCOUNTER — TELEPHONE (OUTPATIENT)
Dept: MEDICAL GROUP | Facility: PHYSICIAN GROUP | Age: 68
End: 2018-08-27

## 2018-08-27 NOTE — TELEPHONE ENCOUNTER
ESTABLISHED PATIENT PRE-VISIT PLANNING     Note: Patient will not be contacted if there is no indication to call.     1.  Reviewed notes from the last few office visits within the medical group: Yes    2.  If any orders were placed at last visit or intended to be done for this visit (i.e. 6 mos follow-up), do we have Results/Consult Notes?        •  Labs - Labs ordered, completed on 08/20/18 and results are in chart.   Note: If patient appointment is for lab review and patient did not complete labs, check with provider if OK to reschedule patient until labs completed.       •  Imaging - Imaging ordered, completed and results are in chart.       •  Referrals - No referrals were ordered at last office visit.    3. Is this appointment scheduled as a Hospital Follow-Up? No    4.  Immunizations were updated in Epic using WebIZ?: Epic matches WebIZ       •  Web Iz Recommendations: FLU, TD and ZOSTAVAX (Shingles)    5.  Patient is due for the following Health Maintenance Topics:   Health Maintenance Due   Topic Date Due   • Annual Wellness Visit  1950   • IMM HEP B VACCINE (1 of 3 - Risk 3-dose series) 08/24/1970   • IMM ZOSTER VACCINES (1 of 2) 08/24/2001   • MAMMOGRAM  07/03/2018   • IMM INFLUENZA (1) 09/01/2018       - Patient has completed FLU, PNEUMOVAX (PPSV23), PREVNAR (PCV13)  and TDAP Immunization(s) per WebIZ. Chart has been updated.    6.  MDX printed for Provider? NO    7.  Patient was NOT informed to arrive 15 min prior to their scheduled appointment and bring in their medication bottles.

## 2018-08-28 ENCOUNTER — OFFICE VISIT (OUTPATIENT)
Dept: MEDICAL GROUP | Facility: PHYSICIAN GROUP | Age: 68
End: 2018-08-28
Payer: MEDICARE

## 2018-08-28 VITALS
OXYGEN SATURATION: 91 % | WEIGHT: 156.09 LBS | BODY MASS INDEX: 30.64 KG/M2 | TEMPERATURE: 98.4 F | HEIGHT: 60 IN | DIASTOLIC BLOOD PRESSURE: 70 MMHG | HEART RATE: 68 BPM | SYSTOLIC BLOOD PRESSURE: 120 MMHG

## 2018-08-28 DIAGNOSIS — E03.9 HYPOTHYROIDISM, UNSPECIFIED TYPE: ICD-10-CM

## 2018-08-28 DIAGNOSIS — I10 ESSENTIAL HYPERTENSION: ICD-10-CM

## 2018-08-28 DIAGNOSIS — R74.01 ELEVATED ALANINE AMINOTRANSFERASE (ALT) LEVEL: ICD-10-CM

## 2018-08-28 DIAGNOSIS — E78.5 DYSLIPIDEMIA: ICD-10-CM

## 2018-08-28 PROCEDURE — 99214 OFFICE O/P EST MOD 30 MIN: CPT | Performed by: FAMILY MEDICINE

## 2018-08-28 RX ORDER — GLIMEPIRIDE 1 MG/1
1 TABLET ORAL EVERY MORNING
Qty: 90 TAB | Refills: 1 | Status: SHIPPED | OUTPATIENT
Start: 2018-08-28 | End: 2018-11-27

## 2018-08-29 NOTE — PROGRESS NOTES
Subjective:      Juancho Parrish is a 68 y.o. female who presents with Diabetes            HPI     Patient returns for follow-up of her medical problems.    In terms of her diabetes mellitus type 2, her diabetes was still not adequately controlled although better when I saw her in April 2018.  She is on maximum dose of metformin 1000 mg twice daily.  Blood work was done before this visit.    For her hypertension, this is under control on losartan.    In terms of the dyslipidemia, she continues to take atorvastatin without myalgias.    For her hypothyroidism, she continues to take thyroid replacement regularly.    Past medical history, past surgical history, family history reviewed-no changes    Social history reviewed-no changes    Allergies reviewed-no changes    Medications reviewed-no changes        ROS     As per HPI, the rest are negative.       Objective:     /70   Pulse 68   Temp 36.9 °C (98.4 °F)   Ht 1.524 m (5')   Wt 70.8 kg (156 lb 1.4 oz)   LMP 01/20/2008   SpO2 91%   BMI 30.48 kg/m²      Physical Exam     Examined alert, awake, oriented, not in distress    Neck-supple, no lymphadenopathy, no thyromegaly  Lungs-clear to auscultation, no rales, no wheezes  Heart-regular rate and rhythm, no murmur  Extremities-no edema, clubbing, cyanosis       Results for JUANCHO PARRISH (MRN [id number]) as of 8/28/2018 18:03   Ref. Range 4/17/2018 10:02 8/20/2018 14:08   Sodium Latest Ref Range: 134 - 144 mmol/L 139 140   Potassium Latest Ref Range: 3.5 - 5.2 mmol/L 4.7 4.4   Chloride Latest Ref Range: 96 - 106 mmol/L 97 99   Co2 Latest Ref Range: 20 - 29 mmol/L 26 24   Glucose Latest Ref Range: 65 - 99 mg/dL 141 (H) 119 (H)   Bun Latest Ref Range: 8 - 27 mg/dL 19 10   Creatinine Latest Ref Range: 0.57 - 1.00 mg/dL 0.95 0.90   GFR If  Latest Ref Range: >59 mL/min/1.73 72 77   GFR If Non  Latest Ref Range: >59 mL/min/1.73 63 67   Bun-Creatinine Ratio Latest Ref Range: 12 -  28  20 11 (L)   Calcium Latest Ref Range: 8.7 - 10.3 mg/dL 9.7 10.1   AST(SGOT) Latest Ref Range: 0 - 40 IU/L 21 37   ALT(SGPT) Latest Ref Range: 0 - 32 IU/L 25 51 (H)   Alkaline Phosphatase Latest Ref Range: 39 - 117 IU/L 50 48   Total Bilirubin Latest Ref Range: 0.0 - 1.2 mg/dL 0.4 0.6   Albumin Latest Ref Range: 3.6 - 4.8 g/dL 4.2 4.6   Total Protein Latest Ref Range: 6.0 - 8.5 g/dL 7.2 7.4   Globulin Latest Ref Range: 1.5 - 4.5 g/dL 3.0 2.8   A-G Ratio Latest Ref Range: 1.2 - 2.2  1.4 1.6   Glycohemoglobin Latest Ref Range: 4.8 - 5.6 % 7.9 (H) 8.4 (H)   Cholesterol,Tot Latest Ref Range: 100 - 199 mg/dL 166 159   Triglycerides Latest Ref Range: 0 - 149 mg/dL 188 (H) 145   HDL Latest Ref Range: >39 mg/dL 47 55   LDL Latest Ref Range: 0 - 99 mg/dL 81 75   VLDL Cholesterol Calc Latest Ref Range: 5 - 40 mg/dL 38 29   Comment: Unknown CANCELED CANCELED   TSH Latest Ref Range: 0.450 - 4.500 uIU/mL 1.680         Assessment/Plan:     1. Uncontrolled type 2 diabetes mellitus without complication, without long-term current use of insulin (HCC)  Diabetes is more out of control than before.  We will have to add a second agent and we will start glimepiride 1 mg 1 tablet daily with breakfast.  Continue metformin 1000 mg twice daily.  We will recheck in 3 months.  Advised to work harder on diet, exercise and weight loss.  - glimepiride (AMARYL) 1 MG tablet; Take 1 Tab by mouth every morning.  Dispense: 90 Tab; Refill: 1  - MICROALBUMIN CREAT RATIO URINE; Future  - LIPID PROFILE; Future  - COMP METABOLIC PANEL; Future  - HEMOGLOBIN A1C; Future  - CBC WITH DIFFERENTIAL; Future  - HEP B SURFACE ANTIGEN; Future  - HEP C VIRUS ANTIBODY; Future  - HEP A AB IGM; Future  - HEP A AB TOTAL; Future  - HEP B SURFACE AB; Future    2. Essential hypertension  Controlled on her medication.  - MICROALBUMIN CREAT RATIO URINE; Future  - LIPID PROFILE; Future  - COMP METABOLIC PANEL; Future  - HEMOGLOBIN A1C; Future  - CBC WITH DIFFERENTIAL;  Future  - HEP B SURFACE ANTIGEN; Future  - HEP C VIRUS ANTIBODY; Future  - HEP A AB IGM; Future  - HEP A AB TOTAL; Future  - HEP B SURFACE AB; Future    3. Dyslipidemia  Triglycerides improved and this is now at goal.  The rest are at goal.  Continue atorvastatin.  - MICROALBUMIN CREAT RATIO URINE; Future  - LIPID PROFILE; Future  - COMP METABOLIC PANEL; Future  - HEMOGLOBIN A1C; Future  - CBC WITH DIFFERENTIAL; Future  - HEP B SURFACE ANTIGEN; Future  - HEP C VIRUS ANTIBODY; Future  - HEP A AB IGM; Future  - HEP A AB TOTAL; Future  - HEP B SURFACE AB; Future    4. Hypothyroidism, unspecified type  Last TSH was adequately replaced in April 2018.  Continue current dose of thyroid medication.  - MICROALBUMIN CREAT RATIO URINE; Future  - LIPID PROFILE; Future  - COMP METABOLIC PANEL; Future  - HEMOGLOBIN A1C; Future  - CBC WITH DIFFERENTIAL; Future  - HEP B SURFACE ANTIGEN; Future  - HEP C VIRUS ANTIBODY; Future  - HEP A AB IGM; Future  - HEP A AB TOTAL; Future  - HEP B SURFACE AB; Future    5. Elevated alanine aminotransferase (ALT) level  She has mild elevation of the ALT.  We have seen this elevation a year ago which has normalized on its own.  Most likely this could be from the cholesterol medication.  Patient rarely uses alcohol.  We will check viral hepatitis panel to make sure we are not dealing with viral hepatitis.  We will recheck liver function tests next visit.  - MICROALBUMIN CREAT RATIO URINE; Future  - LIPID PROFILE; Future  - COMP METABOLIC PANEL; Future  - HEMOGLOBIN A1C; Future  - CBC WITH DIFFERENTIAL; Future  - HEP B SURFACE ANTIGEN; Future  - HEP C VIRUS ANTIBODY; Future  - HEP A AB IGM; Future  - HEP A AB TOTAL; Future  - HEP B SURFACE AB; Future      Please note that this dictation was created using voice recognition software. I have worked with consultants from the vendor as well as technical experts from [employer] to optimize the interface. I have made every reasonable attempt to correct  obvious errors, but I expect that there are errors of grammar and possibly content I did not discover before finalizing the note.

## 2018-09-06 NOTE — PROGRESS NOTES
1. Attempt #:Final  2. WebIZ Checked & Epic Updated: Yes  3. HealthConnect Verified: no  4. Verify PCP: yes    5. Communication Preference Obtained: yes    6. Diabetes Visit Scheduling  Scheduling Status:Scheduled/already scheduled       7. Care Gap Scheduling (Attempt to Schedule EACH Overdue Care Gap!)    Health Maintenance Due   Topic Date Due   • Annual Wellness Visit  1950   • IMM HEP B VACCINE (1 of 3 - Risk 3-dose series) 08/24/1970   • IMM ZOSTER VACCINES (1 of 2) 08/24/2001   • MAMMOGRAM  07/03/2018   • IMM INFLUENZA (1) 09/01/2018        8. Patient was directed to Health and Wellness Website: no     - Patient already has appointment scheduled for Mammogram.    9. Screened for [facility] Prescription? yes  10. [facility] Activation: already active  11. [facility] [place]: no  12. Virtual Visits: no  13. Opt In to Text Messages: yes

## 2018-09-07 ENCOUNTER — HOSPITAL ENCOUNTER (OUTPATIENT)
Dept: RADIOLOGY | Facility: MEDICAL CENTER | Age: 68
End: 2018-09-07
Attending: FAMILY MEDICINE
Payer: MEDICARE

## 2018-09-07 DIAGNOSIS — Z12.39 SCREENING FOR BREAST CANCER: ICD-10-CM

## 2018-09-07 PROCEDURE — 77067 SCR MAMMO BI INCL CAD: CPT

## 2018-09-11 ENCOUNTER — TELEPHONE (OUTPATIENT)
Dept: MEDICAL GROUP | Facility: PHYSICIAN GROUP | Age: 68
End: 2018-09-11

## 2018-09-11 RX ORDER — GLIMEPIRIDE 2 MG/1
2 TABLET ORAL EVERY MORNING
Qty: 90 TAB | Refills: 1 | Status: SHIPPED | OUTPATIENT
Start: 2018-09-11 | End: 2019-03-26

## 2018-09-11 NOTE — TELEPHONE ENCOUNTER
Edis called and is having diarrhea several times a day and she thinks her metformin needs to be changed

## 2018-09-12 NOTE — TELEPHONE ENCOUNTER
She can decrease the dose of the metformin by taking half a tablet twice a day.  Hopefully the diarrhea will improve with a lower dose.  We will increase the dose of glimepiride from 1 mg to 2 mg a day .  Prescription for glimepiride 2 mg 1 tablet daily sent to the pharmacy.

## 2018-11-22 LAB
ALBUMIN SERPL-MCNC: 4.3 G/DL (ref 3.6–4.8)
ALBUMIN/CREAT UR: <4.8 MG/G CREAT (ref 0–30)
ALBUMIN/GLOB SERPL: 1.7 {RATIO} (ref 1.2–2.2)
ALP SERPL-CCNC: 44 IU/L (ref 39–117)
ALT SERPL-CCNC: 41 IU/L (ref 0–32)
AST SERPL-CCNC: 30 IU/L (ref 0–40)
BILIRUB SERPL-MCNC: 0.2 MG/DL (ref 0–1.2)
BUN SERPL-MCNC: 20 MG/DL (ref 8–27)
BUN/CREAT SERPL: 22 (ref 12–28)
CALCIUM SERPL-MCNC: 9.7 MG/DL (ref 8.7–10.3)
CHLORIDE SERPL-SCNC: 102 MMOL/L (ref 96–106)
CHOLEST SERPL-MCNC: 129 MG/DL (ref 100–199)
CO2 SERPL-SCNC: 22 MMOL/L (ref 20–29)
CREAT SERPL-MCNC: 0.89 MG/DL (ref 0.57–1)
CREAT UR-MCNC: 62.3 MG/DL
GLOBULIN SER CALC-MCNC: 2.5 G/DL (ref 1.5–4.5)
GLUCOSE SERPL-MCNC: 129 MG/DL (ref 65–99)
HDLC SERPL-MCNC: 48 MG/DL
IF AFRICAN AMERICAN  100797: 78 ML/MIN/1.73
IF NON AFRICAN AMER 100791: 67 ML/MIN/1.73
LABORATORY COMMENT REPORT: ABNORMAL
LDLC SERPL CALC-MCNC: 44 MG/DL (ref 0–99)
MICROALBUMIN UR-MCNC: <3 UG/ML
POTASSIUM SERPL-SCNC: 4.3 MMOL/L (ref 3.5–5.2)
PROT SERPL-MCNC: 6.8 G/DL (ref 6–8.5)
SODIUM SERPL-SCNC: 139 MMOL/L (ref 134–144)
TRIGL SERPL-MCNC: 186 MG/DL (ref 0–149)
VLDLC SERPL CALC-MCNC: 37 MG/DL (ref 5–40)

## 2018-11-27 ENCOUNTER — OFFICE VISIT (OUTPATIENT)
Dept: MEDICAL GROUP | Facility: PHYSICIAN GROUP | Age: 68
End: 2018-11-27
Payer: MEDICARE

## 2018-11-27 ENCOUNTER — HOSPITAL ENCOUNTER (OUTPATIENT)
Dept: LAB | Facility: MEDICAL CENTER | Age: 68
End: 2018-11-27
Attending: FAMILY MEDICINE
Payer: MEDICARE

## 2018-11-27 VITALS
OXYGEN SATURATION: 97 % | WEIGHT: 156.53 LBS | DIASTOLIC BLOOD PRESSURE: 70 MMHG | TEMPERATURE: 97.6 F | BODY MASS INDEX: 30.73 KG/M2 | HEART RATE: 66 BPM | SYSTOLIC BLOOD PRESSURE: 120 MMHG | HEIGHT: 60 IN

## 2018-11-27 DIAGNOSIS — R74.01 ELEVATED ALT MEASUREMENT: ICD-10-CM

## 2018-11-27 DIAGNOSIS — E78.5 DYSLIPIDEMIA: ICD-10-CM

## 2018-11-27 DIAGNOSIS — E03.9 HYPOTHYROIDISM, UNSPECIFIED TYPE: ICD-10-CM

## 2018-11-27 DIAGNOSIS — Z23 NEED FOR VACCINATION: ICD-10-CM

## 2018-11-27 DIAGNOSIS — I10 ESSENTIAL HYPERTENSION: ICD-10-CM

## 2018-11-27 LAB
BASOPHILS # BLD AUTO: 1 % (ref 0–1.8)
BASOPHILS # BLD: 0.06 K/UL (ref 0–0.12)
EOSINOPHIL # BLD AUTO: 0.06 K/UL (ref 0–0.51)
EOSINOPHIL NFR BLD: 1 % (ref 0–6.9)
ERYTHROCYTE [DISTWIDTH] IN BLOOD BY AUTOMATED COUNT: 45.7 FL (ref 35.9–50)
HCT VFR BLD AUTO: 41.5 % (ref 37–47)
HGB BLD-MCNC: 13.3 G/DL (ref 12–16)
IMM GRANULOCYTES # BLD AUTO: 0.02 K/UL (ref 0–0.11)
IMM GRANULOCYTES NFR BLD AUTO: 0.3 % (ref 0–0.9)
LYMPHOCYTES # BLD AUTO: 2.48 K/UL (ref 1–4.8)
LYMPHOCYTES NFR BLD: 40.1 % (ref 22–41)
MCH RBC QN AUTO: 30.3 PG (ref 27–33)
MCHC RBC AUTO-ENTMCNC: 32 G/DL (ref 33.6–35)
MCV RBC AUTO: 94.5 FL (ref 81.4–97.8)
MONOCYTES # BLD AUTO: 0.45 K/UL (ref 0–0.85)
MONOCYTES NFR BLD AUTO: 7.3 % (ref 0–13.4)
NEUTROPHILS # BLD AUTO: 3.12 K/UL (ref 2–7.15)
NEUTROPHILS NFR BLD: 50.3 % (ref 44–72)
NRBC # BLD AUTO: 0 K/UL
NRBC BLD-RTO: 0 /100 WBC
PLATELET # BLD AUTO: 284 K/UL (ref 164–446)
PMV BLD AUTO: 11.6 FL (ref 9–12.9)
RBC # BLD AUTO: 4.39 M/UL (ref 4.2–5.4)
WBC # BLD AUTO: 6.2 K/UL (ref 4.8–10.8)

## 2018-11-27 PROCEDURE — 90662 IIV NO PRSV INCREASED AG IM: CPT | Performed by: FAMILY MEDICINE

## 2018-11-27 PROCEDURE — 80074 ACUTE HEPATITIS PANEL: CPT

## 2018-11-27 PROCEDURE — 84443 ASSAY THYROID STIM HORMONE: CPT

## 2018-11-27 PROCEDURE — 99214 OFFICE O/P EST MOD 30 MIN: CPT | Mod: 25 | Performed by: FAMILY MEDICINE

## 2018-11-27 PROCEDURE — 36415 COLL VENOUS BLD VENIPUNCTURE: CPT

## 2018-11-27 PROCEDURE — 83036 HEMOGLOBIN GLYCOSYLATED A1C: CPT | Mod: GA

## 2018-11-27 PROCEDURE — 85025 COMPLETE CBC W/AUTO DIFF WBC: CPT

## 2018-11-27 PROCEDURE — G0008 ADMIN INFLUENZA VIRUS VAC: HCPCS | Performed by: FAMILY MEDICINE

## 2018-11-28 ENCOUNTER — TELEPHONE (OUTPATIENT)
Dept: MEDICAL GROUP | Facility: PHYSICIAN GROUP | Age: 68
End: 2018-11-28

## 2018-11-28 DIAGNOSIS — E78.5 DYSLIPIDEMIA: ICD-10-CM

## 2018-11-28 LAB
EST. AVERAGE GLUCOSE BLD GHB EST-MCNC: 197 MG/DL
HAV IGM SERPL QL IA: NORMAL
HBA1C MFR BLD: 8.5 % (ref 0–5.6)
HBV CORE IGM SER QL: NEGATIVE
HBV SURFACE AG SER QL: NEGATIVE
HCV AB SER QL: NEGATIVE
TSH SERPL DL<=0.005 MIU/L-ACNC: 3.86 UIU/ML (ref 0.38–5.33)

## 2018-11-28 NOTE — PROGRESS NOTES
Subjective:      Bernadette Freitas is a 68 y.o. female who presents with Immunizations (discuss also about AWV)            HPI     Returns for follow-up of her medical problems.    For her diabetes mellitus type 2, she was having diarrhea from metformin 1000 mg twice a day so we decreased the dose to half a tablet twice a day and increase the glimepiride from 1 mg to 2 mg daily.  She said her blood sugar dropped to 87 and she got shaky and so she has been taking her glimepiride only if the blood sugar runs in the 200s probably once a week only.  She said that she has increased the metformin dose back to 1000 mg twice daily because the diarrhea has resolved already and she is tolerating the maximum dose without problems.    In terms of the dyslipidemia, she continues to take atorvastatin without myalgias.    Her blood pressures under control on losartan.    She continues to take levothyroxine for hypothyroidism.    We have been following her for mild elevation of her ALT.  I have sent her for blood work to check for viral hepatitis but unfortunately this was not done.      Past medical history, past surgical history, family history reviewed-no changes    Social history reviewed-no changes    Allergies reviewed-no changes    Medications reviewed-no changes        ROS     As per HPI, the rest are negative.       Objective:     /70 (BP Location: Left arm, Patient Position: Sitting, BP Cuff Size: Adult)   Pulse 66   Temp 36.4 °C (97.6 °F) (Temporal)   Ht 1.524 m (5')   Wt 71 kg (156 lb 8.4 oz)   LMP 01/20/2008   SpO2 97%   BMI 30.57 kg/m²      Physical Exam     Examined alert, awake, oriented, not in distress    Neck-supple, no lymphadenopathy, no thyromegaly  Lungs-clear to auscultation, no rales, no wheezes  Heart-regular rate and rhythm, no murmur  Extremities-no edema, clubbing, cyanosis       Orders Only on 11/21/2018   Component Date Value   • Glucose 11/21/2018 129*   • Bun 11/21/2018 20    •  Creatinine 11/21/2018 0.89    • GFR If Non  Ameri* 11/21/2018 67    • GFR If  11/21/2018 78    • Bun-Creatinine Ratio 11/21/2018 22    • Sodium 11/21/2018 139    • Potassium 11/21/2018 4.3    • Chloride 11/21/2018 102    • Co2 11/21/2018 22    • Calcium 11/21/2018 9.7    • Total Protein 11/21/2018 6.8    • Albumin 11/21/2018 4.3    • Globulin 11/21/2018 2.5    • A-G Ratio 11/21/2018 1.7    • Total Bilirubin 11/21/2018 0.2    • Alkaline Phosphatase 11/21/2018 44    • AST(SGOT) 11/21/2018 30    • ALT(SGPT) 11/21/2018 41*   • Cholesterol,Tot 11/21/2018 129    • Triglycerides 11/21/2018 186*   • HDL 11/21/2018 48    • VLDL Cholesterol Calc 11/21/2018 37    • LDL 11/21/2018 44    • Comment: 11/21/2018 CANCELED    • Creatinine, Random Urine 11/21/2018 62.3    • Microalbumin, Urine Rand* 11/21/2018 <3.0    • Albumin / Creatinine Rat* 11/21/2018 <4.8         Assessment/Plan:     1. Need for vaccination  High-dose flu shot was given.  - INFLUENZA VACCINE, HIGH DOSE (65+ ONLY)    2. Uncontrolled diabetes mellitus type 2 without complications (HCC)  Hemoglobin A1c was not done and so we will have her go and get a hemoglobin A1c done at the lab today.  We will call her with results.  Advised to continue taking metformin 1000 mg twice a day and to take the glimepiride 1 mg 1 tablet daily since she is already back on taking the maximum dose of the metformin..  She will check if she starts having symptoms of low or high blood sugars.  We can decrease the dose of the glimepiride if she keeps having low sugars with symptoms.  I told her that hypoglycemia is blood sugar of less than 60.  - HEPATITIS PANEL ACUTE(4 COMPONENTS); Future  - HEMOGLOBIN A1C; Future  - CBC WITH DIFFERENTIAL; Future    3. Dyslipidemia  All at target except for the triglycerides slightly high.  Advised to work harder on diet, exercise and weight loss.    4. Essential hypertension  Controlled on her medication.    5. Hypothyroidism,  unspecified type  The last TSH was in April 2018 and was adequately replaced.  We will do an updated TSH.  - TSH; Future    6. Elevated ALT measurement  She will do viral hepatitis panel.  We will communicate results to her.  - HEPATITIS PANEL ACUTE(4 COMPONENTS); Future  - HEMOGLOBIN A1C; Future  - CBC WITH DIFFERENTIAL; Future      Please note that this dictation was created using voice recognition software. I have worked with consultants from the vendor as well as technical experts from [facility] to optimize the interface. I have made every reasonable attempt to correct obvious errors, but I expect that there are errors of grammar and possibly content I did not discover before finalizing the note.

## 2018-11-28 NOTE — TELEPHONE ENCOUNTER
They are currently working on the 2 rests that your looking for.  Should have them ready in a couple of hours

## 2019-03-04 DIAGNOSIS — F33.9 RECURRENT MAJOR DEPRESSIVE DISORDER, REMISSION STATUS UNSPECIFIED (HCC): ICD-10-CM

## 2019-03-04 RX ORDER — ESCITALOPRAM OXALATE 10 MG/1
10 TABLET ORAL DAILY
Qty: 90 TAB | Refills: 1 | Status: SHIPPED | OUTPATIENT
Start: 2019-03-04 | End: 2019-06-21 | Stop reason: SDUPTHER

## 2019-03-18 ENCOUNTER — HOSPITAL ENCOUNTER (OUTPATIENT)
Dept: LAB | Facility: MEDICAL CENTER | Age: 69
End: 2019-03-18
Attending: FAMILY MEDICINE
Payer: MEDICARE

## 2019-03-18 DIAGNOSIS — E78.5 DYSLIPIDEMIA: ICD-10-CM

## 2019-03-18 LAB
ALBUMIN SERPL BCP-MCNC: 4.3 G/DL (ref 3.2–4.9)
ALBUMIN/GLOB SERPL: 1.6 G/DL
ALP SERPL-CCNC: 39 U/L (ref 30–99)
ALT SERPL-CCNC: 49 U/L (ref 2–50)
ANION GAP SERPL CALC-SCNC: 9 MMOL/L (ref 0–11.9)
AST SERPL-CCNC: 35 U/L (ref 12–45)
BILIRUB SERPL-MCNC: 0.5 MG/DL (ref 0.1–1.5)
BUN SERPL-MCNC: 13 MG/DL (ref 8–22)
CALCIUM SERPL-MCNC: 9.7 MG/DL (ref 8.5–10.5)
CHLORIDE SERPL-SCNC: 101 MMOL/L (ref 96–112)
CHOLEST SERPL-MCNC: 135 MG/DL (ref 100–199)
CO2 SERPL-SCNC: 28 MMOL/L (ref 20–33)
CREAT SERPL-MCNC: 0.9 MG/DL (ref 0.5–1.4)
EST. AVERAGE GLUCOSE BLD GHB EST-MCNC: 160 MG/DL
FASTING STATUS PATIENT QL REPORTED: NORMAL
GLOBULIN SER CALC-MCNC: 2.7 G/DL (ref 1.9–3.5)
GLUCOSE SERPL-MCNC: 113 MG/DL (ref 65–99)
HBA1C MFR BLD: 7.2 % (ref 0–5.6)
HDLC SERPL-MCNC: 47 MG/DL
LDLC SERPL CALC-MCNC: 64 MG/DL
POTASSIUM SERPL-SCNC: 4.4 MMOL/L (ref 3.6–5.5)
PROT SERPL-MCNC: 7 G/DL (ref 6–8.2)
SODIUM SERPL-SCNC: 138 MMOL/L (ref 135–145)
TRIGL SERPL-MCNC: 121 MG/DL (ref 0–149)

## 2019-03-18 PROCEDURE — 80053 COMPREHEN METABOLIC PANEL: CPT

## 2019-03-18 PROCEDURE — 83036 HEMOGLOBIN GLYCOSYLATED A1C: CPT | Mod: GA

## 2019-03-18 PROCEDURE — 80061 LIPID PANEL: CPT

## 2019-03-18 PROCEDURE — 36415 COLL VENOUS BLD VENIPUNCTURE: CPT

## 2019-03-26 ENCOUNTER — OFFICE VISIT (OUTPATIENT)
Dept: MEDICAL GROUP | Facility: PHYSICIAN GROUP | Age: 69
End: 2019-03-26
Payer: MEDICARE

## 2019-03-26 VITALS
DIASTOLIC BLOOD PRESSURE: 70 MMHG | OXYGEN SATURATION: 94 % | BODY MASS INDEX: 31.73 KG/M2 | HEIGHT: 60 IN | TEMPERATURE: 98.1 F | SYSTOLIC BLOOD PRESSURE: 130 MMHG | HEART RATE: 69 BPM | WEIGHT: 161.6 LBS

## 2019-03-26 DIAGNOSIS — F33.40 RECURRENT MAJOR DEPRESSIVE DISORDER, IN REMISSION (HCC): ICD-10-CM

## 2019-03-26 DIAGNOSIS — E78.5 DYSLIPIDEMIA: ICD-10-CM

## 2019-03-26 DIAGNOSIS — I10 ESSENTIAL HYPERTENSION: ICD-10-CM

## 2019-03-26 DIAGNOSIS — R00.2 PALPITATIONS: ICD-10-CM

## 2019-03-26 PROCEDURE — 99214 OFFICE O/P EST MOD 30 MIN: CPT | Performed by: FAMILY MEDICINE

## 2019-03-26 NOTE — PROGRESS NOTES
Subjective:      Bernadette Freitas is a 68 y.o. female who presents with Diabetes        HPI:    Patient is here for follow-up of her medical problems as well as for some concerns.    Palpitations  She complains of brief and infrequent palpitations that she describes as abnormal beats. The palpations only last for a few seconds. She drinks coffee daily.    Uncontrolled diabetes mellitus type 2 without complications  She is taking 1000 mg metformin twice daily and glimepiride 1 mg daily. Patient has noticed she is hypoglycemic at home, but she admits to not eating 3 meals a day every day.  She said she eats the first meal at 10 AM when she wakes up as her breakfast and lunch and eats a very light dinner.  She has gained five pounds since our last visit.    Dyslipidemia  She continues to take atorvastatin without myalgias    Essential hypertension  Her blood pressure is well controlled on Losartan. She denies any side effects. Her blood pressure is 130/70 in the office today.    Hypothyroidism  She continues levothyroxine for thyroid replacement.    Elevated ALT measurement  We have been following her for mild elevation of her ALT. Her ALT returned to normal on most recent blood work.  Viral hepatitis panel came back negative for hepatitis A, B, C.    Recurrent major depressive disorder, in remission  For her depression, she is taking 10 mg Lexapro daily. Her symptoms are controlled. She is interested in decreasing her dosage since she has been doing well on the medication without any more symptoms.    Past medical history, past surgical history, family history reviewed-no changes    Social history reviewed-no changes    Allergies reviewed-no changes    Medications reviewed-no changes    ROS:  As per the HPI as shown above, the rest are negative.       Objective:     /70 (BP Location: Left arm, Patient Position: Sitting, BP Cuff Size: Adult)   Pulse 69   Temp 36.7 °C (98.1 °F) (Temporal)   Ht 1.524 m (5')    Wt 73.3 kg (161 lb 9.6 oz)   LMP 01/20/2008   SpO2 94%   BMI 31.56 kg/m²     Physical Exam    Examined alert, awake, oriented, not in distress    Neck-supple, no lymphadenopathy, no thyromegaly  Lungs-clear to auscultation, no rales, no wheezes  Heart-regular rate and rhythm, no murmur  Extremities-no edema, clubbing, cyanosis    Monofilament testing with a 10 gram force: sensation intact: intact bilaterally  Visual Inspection: Feet without maceration, ulcers, fissures.  Pedal pulses: intact bilaterally     Labs:  Hospital Outpatient Visit on 03/18/2019   Component Date Value Ref Range Status   • Cholesterol,Tot 03/18/2019 135  100 - 199 mg/dL Final   • Triglycerides 03/18/2019 121  0 - 149 mg/dL Final   • HDL 03/18/2019 47  >=40 mg/dL Final   • LDL 03/18/2019 64  <100 mg/dL Final   • Sodium 03/18/2019 138  135 - 145 mmol/L Final   • Potassium 03/18/2019 4.4  3.6 - 5.5 mmol/L Final   • Chloride 03/18/2019 101  96 - 112 mmol/L Final   • Co2 03/18/2019 28  20 - 33 mmol/L Final   • Anion Gap 03/18/2019 9.0  0.0 - 11.9 Final   • Glucose 03/18/2019 113* 65 - 99 mg/dL Final   • Bun 03/18/2019 13  8 - 22 mg/dL Final   • Creatinine 03/18/2019 0.90  0.50 - 1.40 mg/dL Final   • Calcium 03/18/2019 9.7  8.5 - 10.5 mg/dL Final   • AST(SGOT) 03/18/2019 35  12 - 45 U/L Final   • ALT(SGPT) 03/18/2019 49  2 - 50 U/L Final   • Alkaline Phosphatase 03/18/2019 39  30 - 99 U/L Final   • Total Bilirubin 03/18/2019 0.5  0.1 - 1.5 mg/dL Final   • Albumin 03/18/2019 4.3  3.2 - 4.9 g/dL Final   • Total Protein 03/18/2019 7.0  6.0 - 8.2 g/dL Final   • Globulin 03/18/2019 2.7  1.9 - 3.5 g/dL Final   • A-G Ratio 03/18/2019 1.6  g/dL Final   • Glycohemoglobin 03/18/2019 7.2* 0.0 - 5.6 % Final    Comment: Increased risk for diabetes:  5.7 -6.4%  Diabetes:  >6.4%  Glycemic control for adults with diabetes:  <7.0%  The above interpretations are per ADA guidelines.  Diagnosis  of diabetes mellitus on the basis of elevated Hemoglobin A1c  should  be confirmed by repeating the Hb A1c test.     • Est Avg Glucose 03/18/2019 160  mg/dL Final    Comment: The eAG calculation is based on the A1c-Derived Daily Glucose  (ADAG) study.  See the ADA's website for additional information.     • Fasting Status 03/18/2019 Fasting   Final   • GFR If  03/18/2019 >60  >60 mL/min/1.73 m 2 Final   • GFR If Non  03/18/2019 >60  >60 mL/min/1.73 m 2 Final        Assessment/Plan:     1. Uncontrolled diabetes mellitus type 2 without complications (HCC)  Her A1C improved significantly from 8.5 to 7.2. Her blood glucose is 113 today. Normal monofilament exam. Patient complains that she is hypoglycemic at home. Patient advised to take all three meals a day and eat a light protein snack in between meals. We will stop her glimepiride and start her on 10 milligrams Jardiance to avoid hypoglycemia and to have cardiovascular protection at the same time.  Discussed potential side effects of Jardiance including vaginal yeast infection, hypotension.  Discussed how to avoid the side effects.  Recheck blood work in 3 months.  - Empagliflozin (JARDIANCE) 10 MG Tab; Take 1 Tab by mouth every day.  Dispense: 90 Tab; Refill: 1  - Lipid Profile; Future  - Comp Metabolic Panel; Future  - HEMOGLOBIN A1C; Future    2. Essential hypertension  Well controlled on current medication. Blood pressure is 130/70 in the office today.  - Lipid Profile; Future  - Comp Metabolic Panel; Future  - HEMOGLOBIN A1C; Future    3. Dyslipidemia  Lipid panel is all within normal limits. Continue current statin.  Ordered repeat lipid panel for next visit.  - Lipid Profile; Future  - Comp Metabolic Panel; Future  - HEMOGLOBIN A1C; Future    4. Recurrent major depressive disorder, in remission (HCC)  Under good control.  She will try to decrease the dose of the Lexapro from 10 mg 1 tablet a day to half a tablet a day for a month and after that if she is doing well she can take half a tablet every  other day for 1 month and then discontinue.    5.  Palpitations   palpations are very brief and infrequent. Patient understands she should monitor her palpitations and if they become more frequent she should contact my office for follow up.  Advised to stop all caffeinated drinks.    INicholas (Scribe), am scribing for, and in the presence of, Renetta Khan MD     Electronically signed by: Nicholas Perez (Scribe), 3/26/2019    IRenetta MD personally performed the services described in this documentation, as scribed by Nicholas Perez in my presence, and it is both accurate and complete.

## 2019-06-21 DIAGNOSIS — F33.9 RECURRENT MAJOR DEPRESSIVE DISORDER, REMISSION STATUS UNSPECIFIED (HCC): ICD-10-CM

## 2019-06-24 RX ORDER — ESCITALOPRAM OXALATE 10 MG/1
TABLET ORAL
Qty: 90 TAB | Refills: 3 | Status: SHIPPED | OUTPATIENT
Start: 2019-06-24 | End: 2019-07-29 | Stop reason: SDUPTHER

## 2019-07-08 ENCOUNTER — OFFICE VISIT (OUTPATIENT)
Dept: MEDICAL GROUP | Facility: PHYSICIAN GROUP | Age: 69
End: 2019-07-08
Payer: MEDICARE

## 2019-07-08 VITALS
HEIGHT: 60 IN | WEIGHT: 154.76 LBS | OXYGEN SATURATION: 92 % | BODY MASS INDEX: 30.38 KG/M2 | HEART RATE: 68 BPM | DIASTOLIC BLOOD PRESSURE: 80 MMHG | TEMPERATURE: 97.7 F | SYSTOLIC BLOOD PRESSURE: 140 MMHG

## 2019-07-08 DIAGNOSIS — J01.00 ACUTE MAXILLARY SINUSITIS, RECURRENCE NOT SPECIFIED: ICD-10-CM

## 2019-07-08 PROCEDURE — 99214 OFFICE O/P EST MOD 30 MIN: CPT | Performed by: FAMILY MEDICINE

## 2019-07-08 RX ORDER — FLUTICASONE PROPIONATE 50 MCG
2 SPRAY, SUSPENSION (ML) NASAL DAILY
Qty: 16 G | Refills: 1 | Status: SHIPPED | OUTPATIENT
Start: 2019-07-08 | End: 2019-09-02 | Stop reason: SDUPTHER

## 2019-07-08 RX ORDER — DOXYCYCLINE HYCLATE 100 MG
100 TABLET ORAL 2 TIMES DAILY
Qty: 20 TAB | Refills: 0 | Status: SHIPPED | OUTPATIENT
Start: 2019-07-08 | End: 2019-07-30

## 2019-07-08 NOTE — PROGRESS NOTES
Subjective:      Bernadette Freitas is a 68 y.o. female who presents with URI (symptoms)      HPI:      Patient presents today with complaints of mild sinus pain/congestion gradual onset 2 weeks ago. She describes the pain as a facial pressure worse on the right than the left radiating to the right ear.  Pain is more noticeable when she is swallowin which is along the right side of the bridge of the nose extending into the right side of the face and the right. She has nasal discharge that is mostly white and sometimes yellowish to greenish. Alleviation of her symptoms from taking Tylenol. She has also been using a saline nasal spray. Notes associated headache, post-nasal drip and dry cough. Denies any fever, chills, or sore throat.        Past medical history, past surgical history, family history reviewed-no changes    Social history reviewed-no changes    Allergies reviewed-no changes    Medications reviewed-no changes        ROS:  As per the HPI as shown above, the rest are negative.       Objective:     /80 (BP Location: Left arm, Patient Position: Sitting, BP Cuff Size: Adult)   Pulse 68   Temp 36.5 °C (97.7 °F) (Temporal)   Ht 1.524 m (5')   Wt 70.2 kg (154 lb 12.2 oz)   LMP 01/20/2008   SpO2 92%   BMI 30.23 kg/m²     Physical Exam    Examined alert, awake, oriented, not in distress    Ears-bilateral TM intact without signs of infection  Nose- both turbinates are enlarged, no discharge or obstruction, tender right maxilla  Throat-tonsils are normal without enlargement, exudate, or erythema  Neck-supple, no lymphadenopathy, no thyromegaly  Lungs-clear to auscultation, no rales, no wheezes  Heart-regular rate and rhythm, no murmur  Extremities-no edema, clubbing, cyanosis       Assessment/Plan:     1. Acute maxillary sinusitis, recurrence not specified  Patient's symptoms, duration, and physical exam are consistent with sinusitis.  She could not tolerate penicillin because of GI symptoms.  She will  be prescribed Doxycycline 100 mg BID for 10 days and Flonase nasal spray to treat this issue. I have given her instructions to take the antibiotic until she completes the prescription. Advised to continue taking all of her other medications. She is to stay well hydrated and further treat her symptoms with OTC medications such as Mucinex but not Mucinex D to avoid elevation of blood pressure. Patient has a follow-up appointment on 7/29, but she will contact me if she does not feel improved before then.   - doxycycline (VIBRAMYCIN) 100 MG Tab; Take 1 Tab by mouth 2 times a day.  Dispense: 20 Tab; Refill: 0         Raza TORO (Scribe), am scribing for, and in the presence of, Rneetta Khan MD     Electronically signed by: Raza Cruz (Scribe), 7/8/2019    Renetta TORO MD personally performed the services described in this documentation, as scribed by Raza Cruz in my presence, and it is both accurate and complete.

## 2019-07-22 ENCOUNTER — HOSPITAL ENCOUNTER (OUTPATIENT)
Dept: LAB | Facility: MEDICAL CENTER | Age: 69
End: 2019-07-22
Attending: FAMILY MEDICINE
Payer: MEDICARE

## 2019-07-22 DIAGNOSIS — E78.5 DYSLIPIDEMIA: ICD-10-CM

## 2019-07-22 DIAGNOSIS — I10 ESSENTIAL HYPERTENSION: ICD-10-CM

## 2019-07-22 LAB
ALBUMIN SERPL BCP-MCNC: 4.1 G/DL (ref 3.2–4.9)
ALBUMIN/GLOB SERPL: 1.5 G/DL
ALP SERPL-CCNC: 35 U/L (ref 30–99)
ALT SERPL-CCNC: 46 U/L (ref 2–50)
ANION GAP SERPL CALC-SCNC: 8 MMOL/L (ref 0–11.9)
AST SERPL-CCNC: 32 U/L (ref 12–45)
BILIRUB SERPL-MCNC: 0.4 MG/DL (ref 0.1–1.5)
BUN SERPL-MCNC: 20 MG/DL (ref 8–22)
CALCIUM SERPL-MCNC: 9.9 MG/DL (ref 8.5–10.5)
CHLORIDE SERPL-SCNC: 105 MMOL/L (ref 96–112)
CHOLEST SERPL-MCNC: 118 MG/DL (ref 100–199)
CO2 SERPL-SCNC: 27 MMOL/L (ref 20–33)
CREAT SERPL-MCNC: 0.95 MG/DL (ref 0.5–1.4)
EST. AVERAGE GLUCOSE BLD GHB EST-MCNC: 157 MG/DL
FASTING STATUS PATIENT QL REPORTED: NORMAL
GLOBULIN SER CALC-MCNC: 2.7 G/DL (ref 1.9–3.5)
GLUCOSE SERPL-MCNC: 109 MG/DL (ref 65–99)
HBA1C MFR BLD: 7.1 % (ref 0–5.6)
HDLC SERPL-MCNC: 46 MG/DL
LDLC SERPL CALC-MCNC: 53 MG/DL
POTASSIUM SERPL-SCNC: 4 MMOL/L (ref 3.6–5.5)
PROT SERPL-MCNC: 6.8 G/DL (ref 6–8.2)
SODIUM SERPL-SCNC: 140 MMOL/L (ref 135–145)
TRIGL SERPL-MCNC: 97 MG/DL (ref 0–149)

## 2019-07-22 PROCEDURE — 83036 HEMOGLOBIN GLYCOSYLATED A1C: CPT | Mod: GA

## 2019-07-22 PROCEDURE — 36415 COLL VENOUS BLD VENIPUNCTURE: CPT

## 2019-07-22 PROCEDURE — 80061 LIPID PANEL: CPT

## 2019-07-22 PROCEDURE — 80053 COMPREHEN METABOLIC PANEL: CPT

## 2019-07-29 ENCOUNTER — OFFICE VISIT (OUTPATIENT)
Dept: MEDICAL GROUP | Facility: PHYSICIAN GROUP | Age: 69
End: 2019-07-29
Payer: MEDICARE

## 2019-07-29 VITALS
SYSTOLIC BLOOD PRESSURE: 150 MMHG | WEIGHT: 152.34 LBS | HEIGHT: 60 IN | OXYGEN SATURATION: 95 % | TEMPERATURE: 97.7 F | DIASTOLIC BLOOD PRESSURE: 90 MMHG | HEART RATE: 72 BPM | BODY MASS INDEX: 29.91 KG/M2

## 2019-07-29 DIAGNOSIS — E78.5 DYSLIPIDEMIA: ICD-10-CM

## 2019-07-29 DIAGNOSIS — K21.9 GASTROESOPHAGEAL REFLUX DISEASE, ESOPHAGITIS PRESENCE NOT SPECIFIED: ICD-10-CM

## 2019-07-29 DIAGNOSIS — I10 ESSENTIAL HYPERTENSION: ICD-10-CM

## 2019-07-29 DIAGNOSIS — E03.9 HYPOTHYROIDISM, UNSPECIFIED TYPE: ICD-10-CM

## 2019-07-29 DIAGNOSIS — F33.9 RECURRENT MAJOR DEPRESSIVE DISORDER, REMISSION STATUS UNSPECIFIED (HCC): ICD-10-CM

## 2019-07-29 PROCEDURE — 99214 OFFICE O/P EST MOD 30 MIN: CPT | Performed by: FAMILY MEDICINE

## 2019-07-29 RX ORDER — LOSARTAN POTASSIUM 100 MG/1
100 TABLET ORAL DAILY
Qty: 90 TAB | Refills: 1 | Status: SHIPPED | OUTPATIENT
Start: 2019-07-29 | End: 2019-08-27 | Stop reason: SDUPTHER

## 2019-07-29 RX ORDER — LEVOTHYROXINE SODIUM 0.07 MG/1
75 TABLET ORAL
Qty: 90 TAB | Refills: 1 | Status: SHIPPED | OUTPATIENT
Start: 2019-07-29 | End: 2020-02-18

## 2019-07-29 RX ORDER — ATORVASTATIN CALCIUM 40 MG/1
TABLET, FILM COATED ORAL
Qty: 90 TAB | Refills: 1 | Status: SHIPPED | OUTPATIENT
Start: 2019-07-29 | End: 2019-12-03

## 2019-07-29 RX ORDER — METOPROLOL SUCCINATE 50 MG/1
TABLET, EXTENDED RELEASE ORAL
Qty: 90 TAB | Refills: 1 | Status: SHIPPED | OUTPATIENT
Start: 2019-07-29 | End: 2020-02-18

## 2019-07-29 RX ORDER — LANCETS 30 GAUGE
EACH MISCELLANEOUS
Qty: 100 EACH | Refills: 5 | Status: SHIPPED | OUTPATIENT
Start: 2019-07-29 | End: 2023-02-07

## 2019-07-29 RX ORDER — PANTOPRAZOLE SODIUM 40 MG/1
TABLET, DELAYED RELEASE ORAL
Qty: 90 TAB | Refills: 1 | Status: SHIPPED | OUTPATIENT
Start: 2019-07-29 | End: 2019-11-27 | Stop reason: SDUPTHER

## 2019-07-29 RX ORDER — ESCITALOPRAM OXALATE 10 MG/1
TABLET ORAL
Qty: 90 TAB | Refills: 3 | Status: SHIPPED | OUTPATIENT
Start: 2019-07-29 | End: 2020-08-12

## 2019-07-29 NOTE — PROGRESS NOTES
Subjective:      Bernadette Freitas is a 68 y.o. female who presents with Diabetes      HPI:    Patient presents today for follow-up of her chronic medical problems.    Diabetes Mellitus  She continues to take Metformin 1000 mg BID for diabetes mellitus type 2. On her last visit, we discontinued her Glimepiride due to episodes of hypoglycemia and started her on Jardiance 10 mg to avoid hypoglycemic episodes and cardiovascular protection. She has not had any side effects or hypoglycemic episodes. She notes that after meals, her blood sugar is typically around 130-140.  Monofilament exam was completed on her last visit 4 months ago. Blood work was completed prior to this visit.     Essential Hypertension  She takes Losartan 50 mg and Metoprolol SR 50 mg for her hypertension without any side effects. Blood pressure in the office today is slightly elevated at 140/80. Upon recheck, it is higher at 150/90.  She said her home blood pressure readings have been slightly higher lately in the 140s over 80s.    Dyslipidemia  She has been taking Atorvastatin 40 mg as prescribed for her dyslipidemia without myalgias or other side effects. Blood work was done before this visit.    Hypothyroidism  Patient continues to take thyroid replacement medication as prescribed. Blood work was completed prior to this visit.    Recurrent major depressive disorder  Stable and well-controlled on Lexapro 10 mg.    GERD  Stable and well-controlled on Protonix 40 mg.    Acute maxillary sinusitis  I prescribed the patient Doxycycline with great improvement of her symptoms.  I also gave her Flonase nasal spray which she is still taking with good results.  She said her symptoms are now almost 100% gone.         Past medical history, past surgical history, family history reviewed-no changes    Social history reviewed-no changes    Allergies reviewed-no changes    Medications reviewed-no changes       ROS:  As per the HPI as shown above, the rest are  negative.       Objective:     /80 (BP Location: Left arm, Patient Position: Sitting, BP Cuff Size: Adult)   Pulse 72   Temp 36.5 °C (97.7 °F) (Temporal)   Ht 1.524 m (5')   Wt 69.1 kg (152 lb 5.4 oz)   LMP 01/20/2008   SpO2 95%   BMI 29.75 kg/m²     Physical Exam    Examined alert, awake, oriented, not in distress    Neck-supple, no lymphadenopathy, no thyromegaly  Lungs-clear to auscultation, no rales, no wheezes  Heart-regular rate and rhythm, no murmur  Extremities-no edema, clubbing, cyanosis       Labs:  Hospital Outpatient Visit on 07/22/2019   Component Date Value Ref Range Status   • Cholesterol,Tot 07/22/2019 118  100 - 199 mg/dL Final   • Triglycerides 07/22/2019 97  0 - 149 mg/dL Final   • HDL 07/22/2019 46  >=40 mg/dL Final   • LDL 07/22/2019 53  <100 mg/dL Final   • Sodium 07/22/2019 140  135 - 145 mmol/L Final   • Potassium 07/22/2019 4.0  3.6 - 5.5 mmol/L Final   • Chloride 07/22/2019 105  96 - 112 mmol/L Final   • Co2 07/22/2019 27  20 - 33 mmol/L Final   • Anion Gap 07/22/2019 8.0  0.0 - 11.9 Final   • Glucose 07/22/2019 109* 65 - 99 mg/dL Final   • Bun 07/22/2019 20  8 - 22 mg/dL Final   • Creatinine 07/22/2019 0.95  0.50 - 1.40 mg/dL Final   • Calcium 07/22/2019 9.9  8.5 - 10.5 mg/dL Final   • AST(SGOT) 07/22/2019 32  12 - 45 U/L Final   • ALT(SGPT) 07/22/2019 46  2 - 50 U/L Final   • Alkaline Phosphatase 07/22/2019 35  30 - 99 U/L Final   • Total Bilirubin 07/22/2019 0.4  0.1 - 1.5 mg/dL Final   • Albumin 07/22/2019 4.1  3.2 - 4.9 g/dL Final   • Total Protein 07/22/2019 6.8  6.0 - 8.2 g/dL Final   • Globulin 07/22/2019 2.7  1.9 - 3.5 g/dL Final   • A-G Ratio 07/22/2019 1.5  g/dL Final   • Glycohemoglobin 07/22/2019 7.1* 0.0 - 5.6 % Final    Comment: Increased risk for diabetes:  5.7 -6.4%  Diabetes:  >6.4%  Glycemic control for adults with diabetes:  <7.0%  The above interpretations are per ADA guidelines.  Diagnosis  of diabetes mellitus on the basis of elevated Hemoglobin  A1c  should be confirmed by repeating the Hb A1c test.     • Est Avg Glucose 07/22/2019 157  mg/dL Final    Comment: The eAG calculation is based on the A1c-Derived Daily Glucose  (ADAG) study.  See the ADA's website for additional information.     • Fasting Status 07/22/2019 Fasting   Final   • GFR If  07/22/2019 >60  >60 mL/min/1.73 m 2 Final   • GFR If Non  07/22/2019 59* >60 mL/min/1.73 m 2 Final          Assessment/Plan:     1. Uncontrolled type 2 diabetes mellitus without complication, without long-term current use of insulin ([facility])  A1C improved from 7.2 to 7.1; the goal for her is 7% or below. We will keep her on Jardiance 10 mg and Metformin 1000 mg BID. Advised her to avoid sweets, decrease her carbohydrate intake, exercise regularly and keep a healthy weight. Labs have been ordered for the next follow up visit. Refills provided.  - metformin (GLUCOPHAGE) 1000 MG tablet; TAKE 1 TABLET BY MOUTH TWICE DAILY WITH MEALS  Dispense: 180 Tab; Refill: 1  - Empagliflozin (JARDIANCE) 10 MG Tab; Take 1 Tab by mouth every day.  Dispense: 90 Tab; Refill: 1  - glucose blood (ONE TOUCH ULTRA TEST) strip; USE ONCE DAILY BEFORE BREAKFAST AND AS NEEDED FOR SYMPTOMS OF HIGH OR LOW BLOOS SUGAR  Dispense: 100 Strip; Refill: 5  - Lancets; Lancets for glucometer. Sig: use twice daily.  Dispense: 100 Each; Refill: 5    2. Dyslipidemia  Lipid panel values were all within goal. Stable on current Atorvastatin dosage. Labs have been ordered for the next follow up visit.   - MICROALBUMIN CREAT RATIO URINE; Future  - Comp Metabolic Panel; Future  - HEMOGLOBIN A1C; Future  - Lipid Profile; Future  - CBC WITH DIFFERENTIAL; Future  - TSH; Future  - atorvastatin (LIPITOR) 40 MG Tab; TAKE 1 TABLET BY MOUTH ONCE DAILY IN THE EVENING  Dispense: 90 Tab; Refill: 1    3. Essential hypertension  Blood pressure is elevated in the office today at 140/80 and then 150/90 upon recheck. She has also noticed higher than  normal readings at home as well. I will increase her Losartan dosage from 50 mg to 100 mg. She is to take two of her current tablets until running out, and then she will transition to the new prescription.  She will continue the metoprolol SR 50 mg daily.  - MICROALBUMIN CREAT RATIO URINE; Future  - Comp Metabolic Panel; Future  - HEMOGLOBIN A1C; Future  - Lipid Profile; Future  - CBC WITH DIFFERENTIAL; Future  - TSH; Future  - metoprolol SR (TOPROL XL) 50 MG TABLET SR 24 HR; TAKE 1 TABLET BY MOUTH ONCE DAILY  Dispense: 90 Tab; Refill: 1  - losartan (COZAAR) 100 MG Tab; Take 1 Tab by mouth every day.  Dispense: 90 Tab; Refill: 1    4. Hypothyroidism, unspecified type  Stable on thyroid replacement medications. Last TSH in 11/2018 was adequately replaced at 3.860. We will continue the current dosage.  Labs have been ordered for the next follow up visit.  - MICROALBUMIN CREAT RATIO URINE; Future  - Comp Metabolic Panel; Future  - HEMOGLOBIN A1C; Future  - Lipid Profile; Future  - CBC WITH DIFFERENTIAL; Future  - TSH; Future  - levothyroxine (SYNTHROID) 75 MCG Tab; Take 1 Tab by mouth Every morning on an empty stomach.  Dispense: 90 Tab; Refill: 1    5. Recurrent major depressive disorder, remission status unspecified (HCC)  Stable on current Lexapro dosage. We will continue to monitor.  - escitalopram (LEXAPRO) 10 MG Tab; TAKE 1 TABLET BY MOUTH ONCE DAILY  Dispense: 90 Tab; Refill: 3    6. Gastroesophageal reflux disease, esophagitis presence not specified  Stable on current Protonix dosage. We will continue to monitor.  - pantoprazole (PROTONIX) 40 MG Tablet Delayed Response; Take 1 tablet by mouth  every day  Dispense: 90 Tab; Refill: 1         Raza TORO (Scrcarlee), am scribing for, and in the presence of, Renetta Khan MD     Electronically signed by: Raza Cruz (Scrpeteye), 7/29/2019    Renetta TORO MD personally performed the services described in this documentation, as scribed by Raza Cruz in  my presence, and it is both accurate and complete.

## 2019-08-27 DIAGNOSIS — I10 ESSENTIAL HYPERTENSION: ICD-10-CM

## 2019-08-27 RX ORDER — LOSARTAN POTASSIUM 100 MG/1
100 TABLET ORAL DAILY
Qty: 90 TAB | Refills: 1 | Status: SHIPPED | OUTPATIENT
Start: 2019-08-27 | End: 2020-04-08 | Stop reason: SDUPTHER

## 2019-08-27 NOTE — TELEPHONE ENCOUNTER
Patient requested a refill on her losratan and the 50 mg was ordered for her instead f the 100 mg.  She would like the correct refill sent to her pharmacy.  Refill request already sent to Dr. Khan for App[facility]

## 2019-09-03 RX ORDER — FLUTICASONE PROPIONATE 50 MCG
SPRAY, SUSPENSION (ML) NASAL
Qty: 16 BOTTLE | Refills: 2 | Status: SHIPPED | OUTPATIENT
Start: 2019-09-03 | End: 2020-04-08 | Stop reason: SDUPTHER

## 2019-09-11 ENCOUNTER — TELEPHONE (OUTPATIENT)
Dept: MEDICAL GROUP | Facility: PHYSICIAN GROUP | Age: 69
End: 2019-09-11

## 2019-09-11 NOTE — TELEPHONE ENCOUNTER
Called kavon and left Voice message about what she was needing refilled.  Could not make out the name on the message she left for a refill

## 2019-09-11 NOTE — TELEPHONE ENCOUNTER
Was the patient seen in the last year in this department? Yes    Does patient have an active prescription for medications requested? No     Received Request Via: Patient     90 DAY SUPPLy. Michael

## 2019-11-25 DIAGNOSIS — K21.9 GASTROESOPHAGEAL REFLUX DISEASE, ESOPHAGITIS PRESENCE NOT SPECIFIED: ICD-10-CM

## 2019-11-25 RX ORDER — PANTOPRAZOLE SODIUM 40 MG/1
TABLET, DELAYED RELEASE ORAL
Qty: 90 TAB | Refills: 0 | Status: CANCELLED | OUTPATIENT
Start: 2019-11-25

## 2019-11-27 ENCOUNTER — HOSPITAL ENCOUNTER (OUTPATIENT)
Dept: LAB | Facility: MEDICAL CENTER | Age: 69
End: 2019-11-27
Attending: FAMILY MEDICINE
Payer: MEDICARE

## 2019-11-27 DIAGNOSIS — E78.5 DYSLIPIDEMIA: ICD-10-CM

## 2019-11-27 DIAGNOSIS — E03.9 HYPOTHYROIDISM, UNSPECIFIED TYPE: ICD-10-CM

## 2019-11-27 DIAGNOSIS — I10 ESSENTIAL HYPERTENSION: ICD-10-CM

## 2019-11-27 LAB
ALBUMIN SERPL BCP-MCNC: 4.7 G/DL (ref 3.2–4.9)
ALBUMIN/GLOB SERPL: 1.7 G/DL
ALP SERPL-CCNC: 40 U/L (ref 30–99)
ALT SERPL-CCNC: 55 U/L (ref 2–50)
ANION GAP SERPL CALC-SCNC: 11 MMOL/L (ref 0–11.9)
AST SERPL-CCNC: 39 U/L (ref 12–45)
BASOPHILS # BLD AUTO: 1.6 % (ref 0–1.8)
BASOPHILS # BLD: 0.07 K/UL (ref 0–0.12)
BILIRUB SERPL-MCNC: 0.6 MG/DL (ref 0.1–1.5)
BUN SERPL-MCNC: 18 MG/DL (ref 8–22)
CALCIUM SERPL-MCNC: 9.6 MG/DL (ref 8.5–10.5)
CHLORIDE SERPL-SCNC: 102 MMOL/L (ref 96–112)
CHOLEST SERPL-MCNC: 165 MG/DL (ref 100–199)
CO2 SERPL-SCNC: 28 MMOL/L (ref 20–33)
CREAT SERPL-MCNC: 0.91 MG/DL (ref 0.5–1.4)
CREAT UR-MCNC: 38.8 MG/DL
EOSINOPHIL # BLD AUTO: 0.08 K/UL (ref 0–0.51)
EOSINOPHIL NFR BLD: 1.9 % (ref 0–6.9)
ERYTHROCYTE [DISTWIDTH] IN BLOOD BY AUTOMATED COUNT: 46.8 FL (ref 35.9–50)
FASTING STATUS PATIENT QL REPORTED: NORMAL
GLOBULIN SER CALC-MCNC: 2.8 G/DL (ref 1.9–3.5)
GLUCOSE SERPL-MCNC: 119 MG/DL (ref 65–99)
HCT VFR BLD AUTO: 44.7 % (ref 37–47)
HDLC SERPL-MCNC: 56 MG/DL
HGB BLD-MCNC: 14.5 G/DL (ref 12–16)
IMM GRANULOCYTES # BLD AUTO: 0 K/UL (ref 0–0.11)
IMM GRANULOCYTES NFR BLD AUTO: 0 % (ref 0–0.9)
LDLC SERPL CALC-MCNC: 83 MG/DL
LYMPHOCYTES # BLD AUTO: 1.6 K/UL (ref 1–4.8)
LYMPHOCYTES NFR BLD: 37.4 % (ref 22–41)
MCH RBC QN AUTO: 30.7 PG (ref 27–33)
MCHC RBC AUTO-ENTMCNC: 32.4 G/DL (ref 33.6–35)
MCV RBC AUTO: 94.5 FL (ref 81.4–97.8)
MICROALBUMIN UR-MCNC: <0.7 MG/DL
MICROALBUMIN/CREAT UR: NORMAL MG/G (ref 0–30)
MONOCYTES # BLD AUTO: 0.54 K/UL (ref 0–0.85)
MONOCYTES NFR BLD AUTO: 12.6 % (ref 0–13.4)
NEUTROPHILS # BLD AUTO: 1.99 K/UL (ref 2–7.15)
NEUTROPHILS NFR BLD: 46.5 % (ref 44–72)
NRBC # BLD AUTO: 0 K/UL
NRBC BLD-RTO: 0 /100 WBC
PLATELET # BLD AUTO: 269 K/UL (ref 164–446)
PMV BLD AUTO: 10.5 FL (ref 9–12.9)
POTASSIUM SERPL-SCNC: 4.1 MMOL/L (ref 3.6–5.5)
PROT SERPL-MCNC: 7.5 G/DL (ref 6–8.2)
RBC # BLD AUTO: 4.73 M/UL (ref 4.2–5.4)
SODIUM SERPL-SCNC: 141 MMOL/L (ref 135–145)
TRIGL SERPL-MCNC: 129 MG/DL (ref 0–149)
TSH SERPL DL<=0.005 MIU/L-ACNC: 0.77 UIU/ML (ref 0.38–5.33)
WBC # BLD AUTO: 4.3 K/UL (ref 4.8–10.8)

## 2019-11-27 PROCEDURE — 80053 COMPREHEN METABOLIC PANEL: CPT

## 2019-11-27 PROCEDURE — 85025 COMPLETE CBC W/AUTO DIFF WBC: CPT

## 2019-11-27 PROCEDURE — 83036 HEMOGLOBIN GLYCOSYLATED A1C: CPT | Mod: GA

## 2019-11-27 PROCEDURE — 80061 LIPID PANEL: CPT

## 2019-11-27 PROCEDURE — 82570 ASSAY OF URINE CREATININE: CPT

## 2019-11-27 PROCEDURE — 82043 UR ALBUMIN QUANTITATIVE: CPT

## 2019-11-27 PROCEDURE — 84443 ASSAY THYROID STIM HORMONE: CPT

## 2019-11-27 PROCEDURE — 36415 COLL VENOUS BLD VENIPUNCTURE: CPT

## 2019-11-27 RX ORDER — PANTOPRAZOLE SODIUM 40 MG/1
TABLET, DELAYED RELEASE ORAL
Qty: 90 TAB | Refills: 1 | Status: SHIPPED | OUTPATIENT
Start: 2019-11-27 | End: 2020-05-18

## 2019-11-28 LAB
EST. AVERAGE GLUCOSE BLD GHB EST-MCNC: 174 MG/DL
HBA1C MFR BLD: 7.7 % (ref 0–5.6)

## 2019-12-03 ENCOUNTER — OFFICE VISIT (OUTPATIENT)
Dept: MEDICAL GROUP | Facility: PHYSICIAN GROUP | Age: 69
End: 2019-12-03
Payer: MEDICARE

## 2019-12-03 VITALS
HEART RATE: 74 BPM | DIASTOLIC BLOOD PRESSURE: 70 MMHG | OXYGEN SATURATION: 91 % | SYSTOLIC BLOOD PRESSURE: 130 MMHG | TEMPERATURE: 96.8 F | HEIGHT: 60 IN | BODY MASS INDEX: 30.1 KG/M2 | WEIGHT: 153.31 LBS

## 2019-12-03 DIAGNOSIS — F33.41 RECURRENT MAJOR DEPRESSIVE DISORDER, IN PARTIAL REMISSION (HCC): ICD-10-CM

## 2019-12-03 DIAGNOSIS — K21.9 GASTROESOPHAGEAL REFLUX DISEASE, ESOPHAGITIS PRESENCE NOT SPECIFIED: ICD-10-CM

## 2019-12-03 DIAGNOSIS — Z12.39 SCREENING FOR BREAST CANCER: ICD-10-CM

## 2019-12-03 DIAGNOSIS — E03.9 HYPOTHYROIDISM, UNSPECIFIED TYPE: ICD-10-CM

## 2019-12-03 DIAGNOSIS — J20.9 ACUTE BRONCHITIS, UNSPECIFIED ORGANISM: ICD-10-CM

## 2019-12-03 DIAGNOSIS — E78.5 DYSLIPIDEMIA: ICD-10-CM

## 2019-12-03 DIAGNOSIS — R73.01 IMPAIRED FASTING GLUCOSE: ICD-10-CM

## 2019-12-03 DIAGNOSIS — R74.01 ELEVATED ALANINE AMINOTRANSFERASE (ALT) LEVEL: ICD-10-CM

## 2019-12-03 DIAGNOSIS — I10 ESSENTIAL HYPERTENSION: ICD-10-CM

## 2019-12-03 PROCEDURE — 99214 OFFICE O/P EST MOD 30 MIN: CPT | Performed by: FAMILY MEDICINE

## 2019-12-03 RX ORDER — AZITHROMYCIN 250 MG/1
TABLET, FILM COATED ORAL
Qty: 6 TAB | Refills: 0 | Status: SHIPPED
Start: 2019-12-03 | End: 2020-04-08

## 2019-12-03 NOTE — LETTER
[facility]  Renetta Khan M.D.  [address]  [place] [address]  Fax: [phone]   Authorization for Release/Disclosure of   Protected Health Information   Name: JUANCOH FREITAS : 1950 SSN: [id number]   Address: [address] [place] [address]   [place] [address] Phone:    [phone] (home)    I authorize the entity listed below to release/disclose the PHI below to:   [facility]/Renetta Khan M.D. and Renetta Khan M.D.   Provider or Entity Name:    [facility] - Dr. Franco   Address   City, [facility] [address], [facility]   Phone:      Fax:     Reason for request: continuity of care   Information to be released:    [  ] LAST COLONOSCOPY,  including any PATH REPORT and follow-up  [  ] LAST FIT/COLOGUARD RESULT [  ] LAST DEXA  [  ] LAST MAMMOGRAM  [  ] LAST PAP  [  ] LAST LABS [x  ] RETINA EXAM REPORT  [  ] IMMUNIZATION RECORDS  [  ] Release all info      [  ] Check here and initial the line next to each item to release ALL health information INCLUDING  _____ Care and treatment for drug and / or alcohol abuse  _____ HIV testing, infection status, or AIDS  _____ Genetic Testing    DATES OF SERVICE OR TIME PERIOD TO BE DISCLOSED: _____________  I understand and acknowledge that:  * This Authorization may be revoked at any time by you in writing, except if your health information has already been used or disclosed.  * Your health information that will be used or disclosed as a result of you signing this authorization could be re-disclosed by the recipient. If this occurs, your re-disclosed health information may no longer be protected by State or Federal laws.  * You may refuse to sign this Authorization. Your refusal will not affect your ability to obtain treatment.  * This Authorization becomes effective upon signing and will  on (date) __________.      If no date is indicated, this Authorization will  one (1) year from the signature date.    Name: Juancho Freitas    Signature:   Date:     12/3/2019            PLEASE FAX REQUESTED RECORDS BACK TO: [phone]

## 2019-12-03 NOTE — PROGRESS NOTES
Subjective:      Bernadette Freitas is a 69 y.o. female who presents with Hypertension    HPI:    The patient is here for follow up on her chronic medical problems and for evaluation of acute cold like symptoms. She is going on vacation to the [facility] in 2 weeks and would like to have medication refills.     Uncontrolled diabetes mellitus type 2 without complications (HCC)  She continues to take Metformin 1000mg BID and Jardiance 10mc for diabetes mellitus type 2 without any problems or side effects. Blood work was completed prior to this visit. The patient notes that she is scheduled for cataracts surgery in January.    Essential Hypertension  In July we increased the dose of the losartan from 50 to 100 mg daily because the blood pressure was still not controlled.  Patient is also on metoprolol  mg daily.  She is tolerating the higher dose without side effects.  Blood pressure in the office today is improved at 130/70.     Dyslipidemia  She has been taking Atorvastatin 40mg as prescribed for her dyslipidemia without myalgias or other side effects. Blood work was done before this visit.    Hypothyroidism  Patient continues to take thyroid replacement medication as prescribed. Blood work was completed prior to this visit.    Recurrent major depressive disorder, in partial remission  She continues to take Lexapro 10 mg 1 tablet daily with good results.    Gastroesophageal reflux disease, esophagitis presence not specified  She is taking Protonix 40 mg 1 tablet as needed only every 3 days.    Elevated alanine aminotransferase (ALT) level  We have seen prior elevation of her ALT.  We have already done work-up with viral hepatitis panel in November 2018 that came back negative for hepatitis A, B and C.  Follow-up blood work came showed elevation resolved spontaneously.    Acute bronchitis, unspecified organism  Patient complains of 4-day duration of cough with greenish phlegm the morning and then it turns white  later in the day.  She has runny nose also with yellowish nasal discharge but denies any sinus pressure.  She denies any postnasal drip.  She has been using Flonase nasal spray with help.  She has been taking Tylenol. Denies fever, chills, or post nasal drip.  She wants to get better before her trip to the [facility] in 2 weeks.    Health Maintenance:  Patient is due for flu shot but because of her acute illness we will have her return when she is 100% better.    Past medical history, past surgical history, family history reviewed-no changes    Social history reviewed-no changes    Allergies reviewed-no changes    Medications reviewed-no changes      ROS:  As per the HPI as shown above, the rest are negative.       Objective:     /70 (BP Location: Left arm, Patient Position: Sitting, BP Cuff Size: Adult)   Pulse 74   Temp 36 °C (96.8 °F) (Temporal)   Ht 1.524 m (5')   Wt 69.5 kg (153 lb 4.9 oz)   LMP 01/20/2008   SpO2 91%   BMI 29.94 kg/m²     Physical Exam   Ears-bilateral TM intact without signs of infection  Nose--turbinates without any swelling or obstruction, minimal whitish mucoid discharge, no tenderness frontal and maxillary maxillary sinuses   Throat-tonsils are normal without enlargement, exudate, or erythema  Lungs- rhonchi left long base, no wheezes or rales with good air exchange  Heart-regular rate and rhythm, no murmur  Extremities no edema, no clubbing, no cyanosis       Labs:  Results for orders placed or performed during the hospital encounter of 11/27/19   TSH   Result Value Ref Range    TSH 0.770 0.380 - 5.330 uIU/mL   CBC WITH DIFFERENTIAL   Result Value Ref Range    WBC 4.3 (L) 4.8 - 10.8 K/uL    RBC 4.73 4.20 - 5.40 M/uL    Hemoglobin 14.5 12.0 - 16.0 g/dL    Hematocrit 44.7 37.0 - 47.0 %    MCV 94.5 81.4 - 97.8 fL    MCH 30.7 27.0 - 33.0 pg    MCHC 32.4 (L) 33.6 - 35.0 g/dL    RDW 46.8 35.9 - 50.0 fL    Platelet Count 269 164 - 446 K/uL    MPV 10.5 9.0 - 12.9 fL     Neutrophils-Polys 46.50 44.00 - 72.00 %    Lymphocytes 37.40 22.00 - 41.00 %    Monocytes 12.60 0.00 - 13.40 %    Eosinophils 1.90 0.00 - 6.90 %    Basophils 1.60 0.00 - 1.80 %    Immature Granulocytes 0.00 0.00 - 0.90 %    Nucleated RBC 0.00 /100 WBC    Neutrophils (Absolute) 1.99 (L) 2.00 - 7.15 K/uL    Lymphs (Absolute) 1.60 1.00 - 4.80 K/uL    Monos (Absolute) 0.54 0.00 - 0.85 K/uL    Eos (Absolute) 0.08 0.00 - 0.51 K/uL    Baso (Absolute) 0.07 0.00 - 0.12 K/uL    Immature Granulocytes (abs) 0.00 0.00 - 0.11 K/uL    NRBC (Absolute) 0.00 K/uL   Lipid Profile   Result Value Ref Range    Cholesterol,Tot 165 100 - 199 mg/dL    Triglycerides 129 0 - 149 mg/dL    HDL 56 >=40 mg/dL    LDL 83 <100 mg/dL   HEMOGLOBIN A1C   Result Value Ref Range    Glycohemoglobin 7.7 (H) 0.0 - 5.6 %    Est Avg Glucose 174 mg/dL   Comp Metabolic Panel   Result Value Ref Range    Sodium 141 135 - 145 mmol/L    Potassium 4.1 3.6 - 5.5 mmol/L    Chloride 102 96 - 112 mmol/L    Co2 28 20 - 33 mmol/L    Anion Gap 11.0 0.0 - 11.9    Glucose 119 (H) 65 - 99 mg/dL    Bun 18 8 - 22 mg/dL    Creatinine 0.91 0.50 - 1.40 mg/dL    Calcium 9.6 8.5 - 10.5 mg/dL    AST(SGOT) 39 12 - 45 U/L    ALT(SGPT) 55 (H) 2 - 50 U/L    Alkaline Phosphatase 40 30 - 99 U/L    Total Bilirubin 0.6 0.1 - 1.5 mg/dL    Albumin 4.7 3.2 - 4.9 g/dL    Total Protein 7.5 6.0 - 8.2 g/dL    Globulin 2.8 1.9 - 3.5 g/dL    A-G Ratio 1.7 g/dL   MICROALBUMIN CREAT RATIO URINE   Result Value Ref Range    Creatinine, Urine 38.80 mg/dL    Microalbumin, Urine Random <0.7 mg/dL    Micro Alb Creat Ratio see below 0 - 30 mg/g   FASTING STATUS   Result Value Ref Range    Fasting Status Fasting    ESTIMATED GFR   Result Value Ref Range    GFR If African American >60 >60 mL/min/1.73 m 2    GFR If Non African American >60 >60 mL/min/1.73 m 2            Assessment/Plan:     1. Uncontrolled diabetes mellitus type 2 without complications (HCC)  Patient's diabetes is not adequately controlled with  hemoglobin A1c increased from 7.1-7.7.  For now she does not want me to adjust her medications.  She would work harder on watching her diet, exercise and weight loss.  If there is no improvement next visit and if there is worsening we will increase the dose of the Jardiance.  Follow-up in 4 months with blood work before that visit.   - Lipid Profile; Future  - Comp Metabolic Panel; Future  - HEMOGLOBIN A1C; Future    2. Essential hypertension  Blood pressure improved with higher dose of losartan.  Continue current dose of losartan and metoprolol. I have advised her to avoid salty foods and exercise regularly and lose weight.  - Lipid Profile; Future  - Comp Metabolic Panel; Future  - HEMOGLOBIN A1C; Future    3. Dyslipidemia  All at target.  Stable on current Atorvastatin 40mg dosage. I have advised the patient to increase her exercise regimen and avoid fatty foods. Labs have been ordered for the next follow up visit.   - Lipid Profile; Future  - Comp Metabolic Panel; Future  - HEMOGLOBIN A1C; Future    4. Hypothyroidism, unspecified type  Adequately replaced.  Continue the same dose of levothyroxine.    5. Recurrent major depressive disorder, in partial remission (HCC)  Stable and well-controlled with Lexapro 10mg 1 tablet daily.    6. Gastroesophageal reflux disease, esophagitis presence not specified  Stable and well-controlled on Protonix 40 mg which she only takes every 3 days.    7. Elevated alanine aminotransferase (ALT) level  We have seen this prior elevation in the past with negative hepatitis A, B, C.  Elevation resolved and now this is elevated again.  This could be related to the statin.  Since this is a mild elevation only we will continue to follow.  We will follow-up blood work next visit..  - Lipid Profile; Future  - Comp Metabolic Panel; Future  - HEMOGLOBIN A1C; Future    8. Acute bronchitis, unspecified organism  We will treat for acute bronchitis especially in the setting of her comorbidities  especially the diabetes.  Prescription for Zithromax Z-THANIA given.  Increase p.o. fluids advised.  Patient advised to rest.  Return precautions given.  - azithromycin (ZITHROMAX) 250 MG Tab; Take 2 tabs today then 1 tab daily daily for 4 days.  Dispense: 6 Tab; Refill: 0    9. Screening for breast cancer  The patient is due for breast cancer screening. She is to schedule an appointment to complete this screening.   - MA-SCREEN MAMMO W/CAD-BILAT; Future    IRoger (Scribe), am scribing for, and in the presence of, Renetta Khan MD     Electronically signed by: Roger Nix (Augustinibe), 12/3/2019    IRenetta MD personally performed the services described in this documentation, as scribed by Roger Nix in my presence, and it is both accurate and complete.

## 2020-02-18 DIAGNOSIS — I10 ESSENTIAL HYPERTENSION: ICD-10-CM

## 2020-02-18 DIAGNOSIS — E78.5 DYSLIPIDEMIA: ICD-10-CM

## 2020-02-18 DIAGNOSIS — E03.9 HYPOTHYROIDISM, UNSPECIFIED TYPE: ICD-10-CM

## 2020-02-18 RX ORDER — ATORVASTATIN CALCIUM 40 MG/1
TABLET, FILM COATED ORAL
Qty: 90 TAB | Refills: 1 | Status: SHIPPED | OUTPATIENT
Start: 2020-02-18 | End: 2020-08-11 | Stop reason: SDUPTHER

## 2020-02-18 RX ORDER — LEVOTHYROXINE SODIUM 0.07 MG/1
TABLET ORAL
Qty: 90 TAB | Refills: 1 | Status: SHIPPED | OUTPATIENT
Start: 2020-02-18 | End: 2020-08-11 | Stop reason: SDUPTHER

## 2020-02-18 RX ORDER — EMPAGLIFLOZIN 10 MG/1
TABLET, FILM COATED ORAL
Qty: 90 TAB | Refills: 1 | Status: SHIPPED | OUTPATIENT
Start: 2020-02-18 | End: 2020-08-11

## 2020-02-18 RX ORDER — GLIMEPIRIDE 2 MG/1
TABLET ORAL
Qty: 90 TAB | Refills: 0 | Status: SHIPPED
Start: 2020-02-18 | End: 2020-04-08

## 2020-02-18 RX ORDER — METOPROLOL SUCCINATE 50 MG/1
TABLET, EXTENDED RELEASE ORAL
Qty: 90 TAB | Refills: 1 | Status: SHIPPED | OUTPATIENT
Start: 2020-02-18 | End: 2020-08-11 | Stop reason: SDUPTHER

## 2020-02-18 RX ORDER — GLIMEPIRIDE 1 MG/1
TABLET ORAL
Qty: 90 TAB | Refills: 1 | Status: SHIPPED
Start: 2020-02-18 | End: 2020-04-08

## 2020-04-03 ENCOUNTER — HOSPITAL ENCOUNTER (OUTPATIENT)
Dept: LAB | Facility: MEDICAL CENTER | Age: 70
End: 2020-04-03
Attending: FAMILY MEDICINE
Payer: MEDICARE

## 2020-04-03 DIAGNOSIS — I10 ESSENTIAL HYPERTENSION: ICD-10-CM

## 2020-04-03 DIAGNOSIS — E78.5 DYSLIPIDEMIA: ICD-10-CM

## 2020-04-03 DIAGNOSIS — R74.01 ELEVATED ALANINE AMINOTRANSFERASE (ALT) LEVEL: ICD-10-CM

## 2020-04-03 LAB
ALBUMIN SERPL BCP-MCNC: 4.6 G/DL (ref 3.2–4.9)
ALBUMIN/GLOB SERPL: 1.6 G/DL
ALP SERPL-CCNC: 47 U/L (ref 30–99)
ALT SERPL-CCNC: 61 U/L (ref 2–50)
ANION GAP SERPL CALC-SCNC: 14 MMOL/L (ref 7–16)
AST SERPL-CCNC: 42 U/L (ref 12–45)
BILIRUB SERPL-MCNC: 0.4 MG/DL (ref 0.1–1.5)
BUN SERPL-MCNC: 14 MG/DL (ref 8–22)
CALCIUM SERPL-MCNC: 9.8 MG/DL (ref 8.5–10.5)
CHLORIDE SERPL-SCNC: 102 MMOL/L (ref 96–112)
CHOLEST SERPL-MCNC: 160 MG/DL (ref 100–199)
CO2 SERPL-SCNC: 25 MMOL/L (ref 20–33)
CREAT SERPL-MCNC: 0.9 MG/DL (ref 0.5–1.4)
EST. AVERAGE GLUCOSE BLD GHB EST-MCNC: 169 MG/DL
FASTING STATUS PATIENT QL REPORTED: NORMAL
GLOBULIN SER CALC-MCNC: 2.8 G/DL (ref 1.9–3.5)
GLUCOSE SERPL-MCNC: 114 MG/DL (ref 65–99)
HBA1C MFR BLD: 7.5 % (ref 0–5.6)
HDLC SERPL-MCNC: 54 MG/DL
LDLC SERPL CALC-MCNC: 79 MG/DL
POTASSIUM SERPL-SCNC: 4.7 MMOL/L (ref 3.6–5.5)
PROT SERPL-MCNC: 7.4 G/DL (ref 6–8.2)
SODIUM SERPL-SCNC: 141 MMOL/L (ref 135–145)
TRIGL SERPL-MCNC: 133 MG/DL (ref 0–149)

## 2020-04-03 PROCEDURE — 36415 COLL VENOUS BLD VENIPUNCTURE: CPT

## 2020-04-03 PROCEDURE — 80061 LIPID PANEL: CPT

## 2020-04-03 PROCEDURE — 80053 COMPREHEN METABOLIC PANEL: CPT

## 2020-04-03 PROCEDURE — 83036 HEMOGLOBIN GLYCOSYLATED A1C: CPT

## 2020-04-08 ENCOUNTER — APPOINTMENT (OUTPATIENT)
Dept: MEDICAL GROUP | Facility: PHYSICIAN GROUP | Age: 70
End: 2020-04-08
Payer: MEDICARE

## 2020-04-08 ENCOUNTER — OFFICE VISIT (OUTPATIENT)
Dept: MEDICAL GROUP | Facility: PHYSICIAN GROUP | Age: 70
End: 2020-04-08

## 2020-04-08 VITALS
HEIGHT: 60 IN | OXYGEN SATURATION: 93 % | SYSTOLIC BLOOD PRESSURE: 120 MMHG | TEMPERATURE: 97.4 F | WEIGHT: 153 LBS | BODY MASS INDEX: 30.04 KG/M2 | DIASTOLIC BLOOD PRESSURE: 60 MMHG | HEART RATE: 71 BPM

## 2020-04-08 DIAGNOSIS — I10 ESSENTIAL HYPERTENSION: ICD-10-CM

## 2020-04-08 DIAGNOSIS — E78.5 DYSLIPIDEMIA: ICD-10-CM

## 2020-04-08 DIAGNOSIS — J30.1 SEASONAL ALLERGIC RHINITIS DUE TO POLLEN: ICD-10-CM

## 2020-04-08 DIAGNOSIS — E03.9 HYPOTHYROIDISM, UNSPECIFIED TYPE: ICD-10-CM

## 2020-04-08 PROCEDURE — 99214 OFFICE O/P EST MOD 30 MIN: CPT | Performed by: FAMILY MEDICINE

## 2020-04-08 RX ORDER — FLUTICASONE PROPIONATE 50 MCG
SPRAY, SUSPENSION (ML) NASAL
Qty: 16 BOTTLE | Refills: 2 | Status: SHIPPED | OUTPATIENT
Start: 2020-04-08 | End: 2021-01-04 | Stop reason: SDUPTHER

## 2020-04-08 RX ORDER — LOSARTAN POTASSIUM 100 MG/1
100 TABLET ORAL DAILY
Qty: 90 TAB | Refills: 1 | Status: SHIPPED | OUTPATIENT
Start: 2020-04-08 | End: 2020-11-09

## 2020-04-08 ASSESSMENT — FIBROSIS 4 INDEX: FIB4 SCORE: 1.36

## 2020-04-08 NOTE — PROGRESS NOTES
Subjective:      Bernadette Freitas is a 69 y.o. female who presents with Hypertension        HPI:    The patient is here for follow up on her chronic medical problems.    Uncontrolled type 2 diabetes mellitus, without long-term current use of insulin (HCC)  At her last visit in December, her A1C went up from 7.1 to 7.7. We made no changes to her medications and advised her to work harder on diet, exercise, and weight loss. She continues to take metformin 1000 mg BID and Jardiance 10 mg once daily for diabetes mellitus type 2 without any problems or side effects. She is due for a monofilament exam. Blood work was completed prior to this visit which showed A1C improved of 7.5 compared to 7.7 last time. Patient's weight today is 153 lbs which is the same as her last visit.    Patient mentions she underwent cataract surgery on her left eye on 3/3/2020 by Dr. Franco at [facility]. States she had an eye exam prior to the surgery. We will obtain the records after she signs a release. States she was supposed to get cataract surgery on her right eye on 3/31, but it was canceled due to the COVID-19 situation. She still plans to get the surgery on her right eye but is waiting for a call to schedule it.    Essential hypertension  She takes losartan 100 mg once daily and metoprolol 50 mg once daily for her hypertension without any side effects. Blood pressure in the office today is within goal.    Dyslipidemia  She has been taking atorvastatin 40 mg as prescribed for her dyslipidemia without myalgias or other side effects. Blood work was done before this visit which showed lipid panel values all WNL.    Hypothyroidism, unspecified type  Patient continues to take thyroid replacement medication as prescribed. Last TSH was in November 2019 which was normal.    Seasonal allergic rhinitis due to pollen  Chronic problem. Patient states she has had worsened seasonal allergies int he last couple of days. States she has some  sinus discomfort and itchy throat which she typically experiences in the spring time.  She would like to have a refill of the Flonase nasal spray which has worked well for her.      Past medical history, past surgical history, family history reviewed-no changes    Social history reviewed-no changes    Allergies reviewed-no changes    Medications reviewed-no changes     ROS:  As per the HPI as shown above, the rest are negative.       Objective:     /60 (BP Location: Left arm, Patient Position: Sitting, BP Cuff Size: Adult)   Pulse 71   Temp 36.3 °C (97.4 °F) (Temporal)   Ht 1.524 m (5')   Wt 69.4 kg (153 lb)   LMP 01/20/2008   SpO2 93%   BMI 29.88 kg/m²     Physical Exam  Examined alert, awake, oriented, not in distress    Neck-supple, no lymphadenopathy, no thyromegaly  Lungs-clear to auscultation, no rales, no wheezes  Heart-regular rate and rhythm, no murmur  Extremities-no edema, clubbing, cyanosis   Monofilament testing with a 10 gram force: sensation intact: intact bilaterally  Visual Inspection: Feet without maceration, ulcers, fissures.  Pedal pulses: intact bilaterally      Labs:  Hospital Outpatient Visit on 04/03/2020   Component Date Value Ref Range Status   • Glycohemoglobin 04/03/2020 7.5* 0.0 - 5.6 % Final    Comment: Increased risk for diabetes:  5.7 -6.4%  Diabetes:  >6.4%  Glycemic control for adults with diabetes:  <7.0%  The above interpretations are per ADA guidelines.  Diagnosis  of diabetes mellitus on the basis of elevated Hemoglobin A1c  should be confirmed by repeating the Hb A1c test.     • Est Avg Glucose 04/03/2020 169  mg/dL Final    Comment: The eAG calculation is based on the A1c-Derived Daily Glucose  (ADAG) study.  See the ADA's website for additional information.     • Sodium 04/03/2020 141  135 - 145 mmol/L Final   • Potassium 04/03/2020 4.7  3.6 - 5.5 mmol/L Final   • Chloride 04/03/2020 102  96 - 112 mmol/L Final   • Co2 04/03/2020 25  20 - 33 mmol/L Final   • Anion  Gap 04/03/2020 14.0  7.0 - 16.0 Final   • Glucose 04/03/2020 114* 65 - 99 mg/dL Final   • Bun 04/03/2020 14  8 - 22 mg/dL Final   • Creatinine 04/03/2020 0.90  0.50 - 1.40 mg/dL Final   • Calcium 04/03/2020 9.8  8.5 - 10.5 mg/dL Final   • AST(SGOT) 04/03/2020 42  12 - 45 U/L Final   • ALT(SGPT) 04/03/2020 61* 2 - 50 U/L Final   • Alkaline Phosphatase 04/03/2020 47  30 - 99 U/L Final   • Total Bilirubin 04/03/2020 0.4  0.1 - 1.5 mg/dL Final   • Albumin 04/03/2020 4.6  3.2 - 4.9 g/dL Final   • Total Protein 04/03/2020 7.4  6.0 - 8.2 g/dL Final   • Globulin 04/03/2020 2.8  1.9 - 3.5 g/dL Final   • A-G Ratio 04/03/2020 1.6  g/dL Final   • Cholesterol,Tot 04/03/2020 160  100 - 199 mg/dL Final   • Triglycerides 04/03/2020 133  0 - 149 mg/dL Final   • HDL 04/03/2020 54  >=40 mg/dL Final   • LDL 04/03/2020 79  <100 mg/dL Final   • Fasting Status 04/03/2020 Fasting   Final   • GFR If  04/03/2020 >60  >60 mL/min/1.73 m 2 Final   • GFR If Non  04/03/2020 >60  >60 mL/min/1.73 m 2 Final             Assessment/Plan:     1. Uncontrolled type 2 diabetes mellitus, without long-term current use of insulin (HCC)  - Her recent labs showed A1C is improved and decreased from 7.7 to 7.5. Discussed that the goal A1C is 7 or below.  She said she will continue to work hard on getting this improved with hemoglobin A1c goal of 7.0 or below.  We will continue the current dosages. I advised her to work harder on eating healthier, exercising regularly, and losing weight. Advised her to avoid sweets, decrease her carbohydrate intake, and keep a healthy weight. Labs have been ordered for the next follow up visit.  If the hemoglobin A1c is not at goal next visit we will increase the dose of Jardiance.  - Monofilament exam was completed today  - Patient states she had an eye exam prior to undergoing cataract surgery last month. We will obtain the records after she signs a release.  - Diabetic Monofilament Lower  Extremity Exam  - Lipid Profile; Future  - Comp Metabolic Panel; Future  - HEMOGLOBIN A1C; Future  - TSH; Future    2. Essential hypertension  - Blood pressure is stable and within goal on her current regimen. We will continue the current dosages. I have advised her to avoid salty foods and exercise regularly.   - losartan (COZAAR) 100 MG Tab; Take 1 Tab by mouth every day.  Dispense: 90 Tab; Refill: 1  - Lipid Profile; Future  - Comp Metabolic Panel; Future  - HEMOGLOBIN A1C; Future  - TSH; Future    3. Dyslipidemia  - Her lipid panel values are all within goal. Stable on current atorvastatin dosage. I have advised the patient to increase her exercise regimen and avoid fatty foods. Labs have been ordered for the next follow up visit.    - Lipid Profile; Future  - Comp Metabolic Panel; Future  - HEMOGLOBIN A1C; Future  - TSH; Future    4. Hypothyroidism, unspecified type  - Stable on thyroid replacement medications. Last TSH was in November 2019 which was normal. We will continue the current plan of care. Labs have been ordered for the next follow up visit.   - Lipid Profile; Future  - Comp Metabolic Panel; Future  - HEMOGLOBIN A1C; Future  - TSH; Future    5. Seasonal allergic rhinitis due to pollen  - Patient states her seasonal allergies have worsened over the last couple of days.  Refills of Flonase sent to pharmacy.  - fluticasone (FLONASE) 50 MCG/ACT nasal spray; SHAKE LIQUID AND USE 2 SPRAYS IN EACH NOSTRIL ONCE DAILY  Dispense: 16 Bottle; Refill: 2     6.  Health Maintenance  - Patient is due for her mammogram. An order was placed already, but she has not completed it yet. Reminded her to get her mammogram done.    Johnnie TORO (Zacarias), am scribing for, and in the presence of, Renetta Khan MD     Electronically signed by: Johnnie Lauren (Augustinibe), 4/8/2020    Renetta TORO MD personally performed the services described in this documentation, as scribed by Johnnie Lauren in my  presence, and it is both accurate and complete.

## 2020-04-08 NOTE — LETTER
[facility]  Renetta Khan M.D.  [address]  [place] [address]  Fax: [phone]   Authorization for Release/Disclosure of   Protected Health Information   Name: JUANCHO FREITAS : 1950 SSN: [id number]   Address: [address] [place] [address]   [place] [address] Phone:    [phone] (home)    I authorize the entity listed below to release/disclose the PHI below to:   [facility]/Renetta Khan M.D. and Renetta Khan M.D.   Provider or Entity Name:    [facility]   Address   City, State, Zip   Phone:      Fax:     Reason for request: continuity of care   Information to be released:    [  ] LAST COLONOSCOPY,  including any PATH REPORT and follow-up  [  ] LAST FIT/COLOGUARD RESULT [  ] LAST DEXA  [  ] LAST MAMMOGRAM  [  ] LAST PAP  [  ] LAST LABS [ x ] RETINA EXAM REPORT  [  ] IMMUNIZATION RECORDS  [  ] Release all info      [  ] Check here and initial the line next to each item to release ALL health information INCLUDING  _____ Care and treatment for drug and / or alcohol abuse  _____ HIV testing, infection status, or AIDS  _____ Genetic Testing    DATES OF SERVICE OR TIME PERIOD TO BE DISCLOSED: _____________  I understand and acknowledge that:  * This Authorization may be revoked at any time by you in writing, except if your health information has already been used or disclosed.  * Your health information that will be used or disclosed as a result of you signing this authorization could be re-disclosed by the recipient. If this occurs, your re-disclosed health information may no longer be protected by State or Federal laws.  * You may refuse to sign this Authorization. Your refusal will not affect your ability to obtain treatment.  * This Authorization becomes effective upon signing and will  on (date) __________.      If no date is indicated, this Authorization will  one (1) year from the signature date.    Name: Juancho Freitas    Signature:   Date:     2020       PLEASE FAX  REQUESTED RECORDS BACK TO: [phone]

## 2020-05-16 DIAGNOSIS — K21.9 GASTROESOPHAGEAL REFLUX DISEASE, ESOPHAGITIS PRESENCE NOT SPECIFIED: ICD-10-CM

## 2020-05-18 DIAGNOSIS — K21.9 GASTROESOPHAGEAL REFLUX DISEASE, ESOPHAGITIS PRESENCE NOT SPECIFIED: ICD-10-CM

## 2020-05-18 RX ORDER — PANTOPRAZOLE SODIUM 40 MG/1
TABLET, DELAYED RELEASE ORAL
Qty: 90 TAB | Refills: 1 | Status: SHIPPED | OUTPATIENT
Start: 2020-05-18 | End: 2020-06-24

## 2020-05-18 RX ORDER — PANTOPRAZOLE SODIUM 40 MG/1
TABLET, DELAYED RELEASE ORAL
Qty: 90 TAB | Refills: 1 | Status: SHIPPED | OUTPATIENT
Start: 2020-05-18 | End: 2020-08-12

## 2020-06-23 ENCOUNTER — HOSPITAL ENCOUNTER (OUTPATIENT)
Dept: RADIOLOGY | Facility: MEDICAL CENTER | Age: 70
End: 2020-06-23
Attending: FAMILY MEDICINE
Payer: MEDICARE

## 2020-06-23 ENCOUNTER — OFFICE VISIT (OUTPATIENT)
Dept: MEDICAL GROUP | Facility: PHYSICIAN GROUP | Age: 70
End: 2020-06-23
Payer: MEDICARE

## 2020-06-23 VITALS
OXYGEN SATURATION: 92 % | BODY MASS INDEX: 29.56 KG/M2 | DIASTOLIC BLOOD PRESSURE: 70 MMHG | HEART RATE: 68 BPM | HEIGHT: 60 IN | TEMPERATURE: 97.1 F | WEIGHT: 150.57 LBS | SYSTOLIC BLOOD PRESSURE: 126 MMHG

## 2020-06-23 DIAGNOSIS — R07.81 RIB PAIN ON LEFT SIDE: ICD-10-CM

## 2020-06-23 LAB
APPEARANCE UR: NORMAL
BILIRUB UR STRIP-MCNC: NORMAL MG/DL
COLOR UR AUTO: NORMAL
GLUCOSE UR STRIP.AUTO-MCNC: NORMAL MG/DL
KETONES UR STRIP.AUTO-MCNC: NORMAL MG/DL
LEUKOCYTE ESTERASE UR QL STRIP.AUTO: NORMAL
NITRITE UR QL STRIP.AUTO: NORMAL
PH UR STRIP.AUTO: 6.5 [PH] (ref 5–8)
PROT UR QL STRIP: NORMAL MG/DL
RBC UR QL AUTO: NORMAL
SP GR UR STRIP.AUTO: 1.01
UROBILINOGEN UR STRIP-MCNC: 0.2 MG/DL

## 2020-06-23 PROCEDURE — 99213 OFFICE O/P EST LOW 20 MIN: CPT | Performed by: FAMILY MEDICINE

## 2020-06-23 PROCEDURE — 71101 X-RAY EXAM UNILAT RIBS/CHEST: CPT | Mod: LT

## 2020-06-23 PROCEDURE — 81002 URINALYSIS NONAUTO W/O SCOPE: CPT | Performed by: FAMILY MEDICINE

## 2020-06-23 ASSESSMENT — FIBROSIS 4 INDEX: FIB4 SCORE: 1.36

## 2020-06-24 RX ORDER — IBUPROFEN 800 MG/1
800 TABLET ORAL EVERY 8 HOURS PRN
Qty: 60 TAB | Refills: 1 | Status: SHIPPED | OUTPATIENT
Start: 2020-06-24 | End: 2023-02-07

## 2020-06-24 NOTE — PROGRESS NOTES
Subjective:      Bernadette Freitas is a 69 y.o. female who presents with Pain (back)            HPI     Patient is here complaining of left-sided chest wall pain on the lower rib cage radiating to the back and to the front sharp in nature initially on and off which started 3 weeks ago but has been steady in the last 1 week.  Sometimes the pain radiates down to the anterior thigh.  Denies any cough, fever, chills, shortness of breath.  Denies any trauma.  Denies any strenuous activity.  She takes care of her grandson 4-year-old 35 pounds and lifts him sometimes.  The pain is 7-8 out of 10.  She has not taken any medication for the pain.  Denies any urinary symptoms or changes in bowel movement.    Past medical history, past surgical history, family history reviewed-no changes    Social history reviewed-no changes    Allergies reviewed-no changes    Medications reviewed-no changes    ROS     As per HPI, the rest are negative       Objective:     /70 (BP Location: Left arm, Patient Position: Sitting, BP Cuff Size: Adult)   Pulse 68   Temp 36.2 °C (97.1 °F) (Temporal)   Ht 1.524 m (5')   Wt 68.3 kg (150 lb 9.2 oz)   LMP 01/20/2008   SpO2 92%   BMI 29.41 kg/m²      Physical Exam     Examined alert, awake, oriented, not in distress    Neck-supple, no lymphadenopathy, no thyromegaly  Lungs-clear to auscultation, no rales, no wheezes  Heart-regular rate and rhythm, no murmur  Chest- positive tenderness on palpation of the ninth and 10th rib on the left both anteriorly and laterally, no evidence of any skin changes in the area where she has the pain  Extremities-no edema, clubbing, cyanosis       Urine dipstick- within normal limits except for glucose 1000 mg consistent with her diabetes     Assessment/Plan:       1. Rib pain on left side  We will get a chest x-ray to make sure were not dealing with rib abnormality and pathology in the lungs.  Advised warm compresses to the area and anti-inflammatories.   Advised rest and avoidance of heavy lifting, pulling, pushing.  - POCT Urinalysis  - AK-KICS-EHMOOINNEZ (WITH 1-VIEW CXR) LEFT; Future    Please note that this dictation was created using voice recognition software. I have worked with consultants from the vendor as well as technical experts from [facility] to optimize the interface. I have made every reasonable attempt to correct obvious errors, but I expect that there are errors of grammar and possibly content I did not discover before finalizing the note.

## 2020-08-01 ENCOUNTER — HOSPITAL ENCOUNTER (OUTPATIENT)
Dept: LAB | Facility: MEDICAL CENTER | Age: 70
End: 2020-08-01
Attending: FAMILY MEDICINE
Payer: MEDICARE

## 2020-08-01 DIAGNOSIS — I10 ESSENTIAL HYPERTENSION: ICD-10-CM

## 2020-08-01 DIAGNOSIS — E03.9 HYPOTHYROIDISM, UNSPECIFIED TYPE: ICD-10-CM

## 2020-08-01 DIAGNOSIS — E78.5 DYSLIPIDEMIA: ICD-10-CM

## 2020-08-01 LAB
ALBUMIN SERPL BCP-MCNC: 4.4 G/DL (ref 3.2–4.9)
ALBUMIN/GLOB SERPL: 1.6 G/DL
ALP SERPL-CCNC: 43 U/L (ref 30–99)
ALT SERPL-CCNC: 41 U/L (ref 2–50)
ANION GAP SERPL CALC-SCNC: 12 MMOL/L (ref 7–16)
AST SERPL-CCNC: 28 U/L (ref 12–45)
BILIRUB SERPL-MCNC: 0.5 MG/DL (ref 0.1–1.5)
BUN SERPL-MCNC: 13 MG/DL (ref 8–22)
CALCIUM SERPL-MCNC: 9.7 MG/DL (ref 8.5–10.5)
CHLORIDE SERPL-SCNC: 103 MMOL/L (ref 96–112)
CHOLEST SERPL-MCNC: 153 MG/DL (ref 100–199)
CO2 SERPL-SCNC: 24 MMOL/L (ref 20–33)
CREAT SERPL-MCNC: 0.8 MG/DL (ref 0.5–1.4)
EST. AVERAGE GLUCOSE BLD GHB EST-MCNC: 166 MG/DL
FASTING STATUS PATIENT QL REPORTED: NORMAL
GLOBULIN SER CALC-MCNC: 2.7 G/DL (ref 1.9–3.5)
GLUCOSE SERPL-MCNC: 127 MG/DL (ref 65–99)
HBA1C MFR BLD: 7.4 % (ref 0–5.6)
HDLC SERPL-MCNC: 50 MG/DL
LDLC SERPL CALC-MCNC: 74 MG/DL
POTASSIUM SERPL-SCNC: 4.1 MMOL/L (ref 3.6–5.5)
PROT SERPL-MCNC: 7.1 G/DL (ref 6–8.2)
SODIUM SERPL-SCNC: 139 MMOL/L (ref 135–145)
TRIGL SERPL-MCNC: 145 MG/DL (ref 0–149)
TSH SERPL DL<=0.005 MIU/L-ACNC: 1.56 UIU/ML (ref 0.38–5.33)

## 2020-08-01 PROCEDURE — 83036 HEMOGLOBIN GLYCOSYLATED A1C: CPT

## 2020-08-01 PROCEDURE — 80053 COMPREHEN METABOLIC PANEL: CPT

## 2020-08-01 PROCEDURE — 80061 LIPID PANEL: CPT

## 2020-08-01 PROCEDURE — 36415 COLL VENOUS BLD VENIPUNCTURE: CPT

## 2020-08-01 PROCEDURE — 84443 ASSAY THYROID STIM HORMONE: CPT

## 2020-08-11 ENCOUNTER — OFFICE VISIT (OUTPATIENT)
Dept: MEDICAL GROUP | Facility: PHYSICIAN GROUP | Age: 70
End: 2020-08-11
Payer: MEDICARE

## 2020-08-11 VITALS
TEMPERATURE: 97 F | BODY MASS INDEX: 29.84 KG/M2 | DIASTOLIC BLOOD PRESSURE: 68 MMHG | RESPIRATION RATE: 16 BRPM | HEIGHT: 60 IN | OXYGEN SATURATION: 94 % | SYSTOLIC BLOOD PRESSURE: 118 MMHG | HEART RATE: 66 BPM | WEIGHT: 152 LBS

## 2020-08-11 DIAGNOSIS — E78.5 DYSLIPIDEMIA: ICD-10-CM

## 2020-08-11 DIAGNOSIS — E03.9 HYPOTHYROIDISM, UNSPECIFIED TYPE: ICD-10-CM

## 2020-08-11 DIAGNOSIS — M72.2 PLANTAR FASCIITIS OF RIGHT FOOT: ICD-10-CM

## 2020-08-11 DIAGNOSIS — Z23 NEED FOR 23-POLYVALENT PNEUMOCOCCAL POLYSACCHARIDE VACCINE: ICD-10-CM

## 2020-08-11 DIAGNOSIS — E11.65 UNCONTROLLED TYPE 2 DIABETES MELLITUS WITH HYPERGLYCEMIA (HCC): ICD-10-CM

## 2020-08-11 DIAGNOSIS — I10 ESSENTIAL HYPERTENSION: ICD-10-CM

## 2020-08-11 PROCEDURE — G0009 ADMIN PNEUMOCOCCAL VACCINE: HCPCS | Performed by: FAMILY MEDICINE

## 2020-08-11 PROCEDURE — 99214 OFFICE O/P EST MOD 30 MIN: CPT | Mod: 25 | Performed by: FAMILY MEDICINE

## 2020-08-11 PROCEDURE — 90732 PPSV23 VACC 2 YRS+ SUBQ/IM: CPT | Performed by: FAMILY MEDICINE

## 2020-08-11 RX ORDER — ATORVASTATIN CALCIUM 40 MG/1
40 TABLET, FILM COATED ORAL EVERY EVENING
Qty: 90 TAB | Refills: 1 | Status: SHIPPED | OUTPATIENT
Start: 2020-08-11 | End: 2021-02-02

## 2020-08-11 RX ORDER — METOPROLOL SUCCINATE 50 MG/1
50 TABLET, EXTENDED RELEASE ORAL DAILY
Qty: 90 TAB | Refills: 1 | Status: SHIPPED | OUTPATIENT
Start: 2020-08-11 | End: 2021-02-02

## 2020-08-11 RX ORDER — EMPAGLIFLOZIN 25 MG/1
1 TABLET, FILM COATED ORAL DAILY
Qty: 90 TAB | Refills: 1 | Status: SHIPPED | OUTPATIENT
Start: 2020-08-11 | End: 2021-02-02

## 2020-08-11 RX ORDER — LEVOTHYROXINE SODIUM 0.07 MG/1
75 TABLET ORAL
Qty: 90 TAB | Refills: 1 | Status: SHIPPED | OUTPATIENT
Start: 2020-08-11 | End: 2021-02-02

## 2020-08-11 RX ORDER — BLOOD SUGAR DIAGNOSTIC
STRIP MISCELLANEOUS
Qty: 150 STRIP | Refills: 1 | Status: SHIPPED | OUTPATIENT
Start: 2020-08-11

## 2020-08-11 ASSESSMENT — FIBROSIS 4 INDEX: FIB4 SCORE: 1.11

## 2020-08-11 NOTE — LETTER
[facility]  Renetta Khan M.D.  [address]  [place] [address]  Fax: [phone]   Authorization for Release/Disclosure of   Protected Health Information   Name: JUANCHO FREITAS : 1950 SSN: [id number]   Address: [address] [place] [address]   [place] [address] Phone:    [phone] (home)    I authorize the entity listed below to release/disclose the PHI below to:   [facility]/Renetta Khan M.D. and Renetta Khan M.D.   Provider or Entity Name:    [facility]   Address   City, State, Zip   Phone:      Fax:     Reason for request: continuity of care   Information to be released:    [  ] LAST COLONOSCOPY,  including any PATH REPORT and follow-up  [  ] LAST FIT/COLOGUARD RESULT [  ] LAST DEXA  [  ] LAST MAMMOGRAM  [  ] LAST PAP  [  ] LAST LABS [ x ] RETINA EXAM REPORT  [  ] IMMUNIZATION RECORDS  [  ] Release all info      [  ] Check here and initial the line next to each item to release ALL health information INCLUDING  _____ Care and treatment for drug and / or alcohol abuse  _____ HIV testing, infection status, or AIDS  _____ Genetic Testing    DATES OF SERVICE OR TIME PERIOD TO BE DISCLOSED: _____________  I understand and acknowledge that:  * This Authorization may be revoked at any time by you in writing, except if your health information has already been used or disclosed.  * Your health information that will be used or disclosed as a result of you signing this authorization could be re-disclosed by the recipient. If this occurs, your re-disclosed health information may no longer be protected by State or Federal laws.  * You may refuse to sign this Authorization. Your refusal will not affect your ability to obtain treatment.  * This Authorization becomes effective upon signing and will  on (date) __________.      If no date is indicated, this Authorization will  one (1) year from the signature date.    Name: Juancho Freitas    Signature:   Date:     2020       PLEASE FAX  REQUESTED RECORDS BACK TO: [phone]

## 2020-08-12 NOTE — PROGRESS NOTES
Subjective:      Bernadette Freitas is a 69 y.o. female who presents with Follow-Up (diabetes, px on RT heel x 2 wks)            HPI     Patient returns for follow-up of her medical problems as well as for new complaint.    In terms of her hypertension, this is under control on losartan and metoprolol without side effects.    In terms of her diabetes mellitus type 2, we have not been able to get this under control yet.  She is on metformin 1000 mg twice a day and Jardiance 10 mg daily.  Blood work was done before this visit.  She said she already had her retinal exam earlier this year by her ophthalmologist.    For her hypothyroidism, she continues to take thyroid replacement.    For her dyslipidemia, she continues to take atorvastatin without myalgias.    She complains of pain in the right heel which has been ongoing for 2 weeks.  The pain is noted when she first gets up to walk after she has been sitting or when she gets up in the morning.  The pain gets better after she has been walking for a while.  Denies any history of trauma.    She is due for Pneumovax 23 today.    Past medical history, past surgical history, family history reviewed-no changes    Social history reviewed-no changes    Allergies reviewed-no changes    Medications reviewed-no changes    ROS     As per HPI, the rest are negative.       Objective:     /68   Pulse 66   Temp 36.1 °C (97 °F)   Resp 16   Ht 1.524 m (5')   Wt 68.9 kg (152 lb)   LMP 01/20/2008   SpO2 94%   BMI 29.69 kg/m²      Physical Exam     Examined alert, awake, oriented, not in distress    Neck-supple, no lymphadenopathy, no thyromegaly  Lungs-clear to auscultation, no rales, no wheezes  Heart-regular rate and rhythm, no murmur  Extremities-no edema, clubbing, cyanosis, strong pedal pulses both feet, no skin changes, no open sores or wounds noted, positive tenderness right heel where the plantar fascia inserts consistent with plantar fasciitis     Hospital  Outpatient Visit on 08/01/2020   Component Date Value Ref Range Status   • TSH 08/01/2020 1.560  0.380 - 5.330 uIU/mL Final    Comment: Please note new reference ranges effective 12/14/2017 10:00 AM  Pregnant Females, 1st Trimester  0.050-3.700  Pregnant Females, 2nd Trimester  0.310-4.350  Pregnant Females, 3rd Trimester  0.410-5.180     • Glycohemoglobin 08/01/2020 7.4* 0.0 - 5.6 % Final    Comment: Increased risk for diabetes:  5.7 -6.4%  Diabetes:  >6.4%  Glycemic control for adults with diabetes:  <7.0%  The above interpretations are per ADA guidelines.  Diagnosis  of diabetes mellitus on the basis of elevated Hemoglobin A1c  should be confirmed by repeating the Hb A1c test.     • Est Avg Glucose 08/01/2020 166  mg/dL Final    Comment: The eAG calculation is based on the A1c-Derived Daily Glucose  (ADAG) study.  See the ADA's website for additional information.     • Sodium 08/01/2020 139  135 - 145 mmol/L Final   • Potassium 08/01/2020 4.1  3.6 - 5.5 mmol/L Final   • Chloride 08/01/2020 103  96 - 112 mmol/L Final   • Co2 08/01/2020 24  20 - 33 mmol/L Final   • Anion Gap 08/01/2020 12.0  7.0 - 16.0 Final   • Glucose 08/01/2020 127* 65 - 99 mg/dL Final   • Bun 08/01/2020 13  8 - 22 mg/dL Final   • Creatinine 08/01/2020 0.80  0.50 - 1.40 mg/dL Final   • Calcium 08/01/2020 9.7  8.5 - 10.5 mg/dL Final   • AST(SGOT) 08/01/2020 28  12 - 45 U/L Final   • ALT(SGPT) 08/01/2020 41  2 - 50 U/L Final   • Alkaline Phosphatase 08/01/2020 43  30 - 99 U/L Final   • Total Bilirubin 08/01/2020 0.5  0.1 - 1.5 mg/dL Final   • Albumin 08/01/2020 4.4  3.2 - 4.9 g/dL Final   • Total Protein 08/01/2020 7.1  6.0 - 8.2 g/dL Final   • Globulin 08/01/2020 2.7  1.9 - 3.5 g/dL Final   • A-G Ratio 08/01/2020 1.6  g/dL Final   • Cholesterol,Tot 08/01/2020 153  100 - 199 mg/dL Final   • Triglycerides 08/01/2020 145  0 - 149 mg/dL Final   • HDL 08/01/2020 50  >=40 mg/dL Final   • LDL 08/01/2020 74  <100 mg/dL Final   • Fasting Status 08/01/2020  Fasting   Final   • GFR If  08/01/2020 >60  >60 mL/min/1.73 m 2 Final   • GFR If Non  08/01/2020 >60  >60 mL/min/1.73 m 2 Final          Assessment/Plan:        1. Essential hypertension  Controlled on her medications.  Continue meds.  - metoprolol SR (TOPROL XL) 50 MG TABLET SR 24 HR; Take 1 Tab by mouth every day.  Dispense: 90 Tab; Refill: 1  - Lipid Profile; Future  - Comp Metabolic Panel; Future  - HEMOGLOBIN A1C; Future  - CBC WITH DIFFERENTIAL; Future  - MICROALBUMIN CREAT RATIO URINE; Future    2. Uncontrolled type 2 diabetes mellitus with hyperglycemia (HCC)  Hemoglobin A1c slightly better but still not adequately controlled.  Increase Jardiance to 25 mg daily.  Continue metformin 1000 mg twice a day.  Advised work harder on watching diet, regular exercises and weight loss.  I will reevaluate in 4 months.  We will get another blood work at that time.  Stressed the importance of making sure that she wipes herself well after urination and if possible to wash the genital area with water to avoid development of yeast infection.  Advised to hydrate herself well.  We will get records of her last retinal exam from her ophthalmologist.  - metformin (GLUCOPHAGE) 1000 MG tablet; TAKE 1 TABLET BY MOUTH TWICE DAILY  Dispense: 180 Tab; Refill: 1  - Empagliflozin (JARDIANCE) 25 MG Tab; Take 1 Tab by mouth every day.  Dispense: 90 Tab; Refill: 1  - Lipid Profile; Future  - Comp Metabolic Panel; Future  - HEMOGLOBIN A1C; Future  - CBC WITH DIFFERENTIAL; Future  - MICROALBUMIN CREAT RATIO URINE; Future    3. Hypothyroidism, unspecified type  Adequately replaced.  Continue the same dose of levothyroxine.  - levothyroxine (SYNTHROID) 75 MCG Tab; Take 1 Tab by mouth Every morning on an empty stomach.  Dispense: 90 Tab; Refill: 1    4. Dyslipidemia  All at target on her cholesterol medication.  - atorvastatin (LIPITOR) 40 MG Tab; Take 1 Tab by mouth every evening.  Dispense: 90 Tab; Refill: 1  -  Lipid Profile; Future  - Comp Metabolic Panel; Future  - HEMOGLOBIN A1C; Future  - CBC WITH DIFFERENTIAL; Future  - MICROALBUMIN CREAT RATIO URINE; Future    5. Plantar fasciitis of right foot  Consistent with plantar fasciitis per history and physical exam.  We will do conservative measures with stretching exercises and these were taught to the patient.  Advised taking over-the-counter Tylenol or NSAIDs as needed.  Advised rolling water bottle with warm water in it with her foot.  Advised using arch support including gel inserts or foam inserts.  If there is no improvement or if there is worsening of the problem we will refer to the podiatrist.    6. Need for 23-polyvalent pneumococcal polysaccharide vaccine  Pneumovax 23 was given today.  - Pneumococal Polysaccharide Vaccine 23-Valent =>3YO SQ/IM    Follow-up in 4 months or sooner if needed.      Please note that this dictation was created using voice recognition software. I have worked with consultants from the vendor as well as technical experts from [facility]  [employer] to optimize the interface. I have made every reasonable attempt to correct obvious errors, but I expect that there are errors of grammar and possibly content I did not discover before finalizing the note.

## 2020-12-12 ENCOUNTER — HOSPITAL ENCOUNTER (OUTPATIENT)
Dept: LAB | Facility: MEDICAL CENTER | Age: 70
End: 2020-12-12
Attending: FAMILY MEDICINE
Payer: MEDICARE

## 2020-12-12 DIAGNOSIS — E78.5 DYSLIPIDEMIA: ICD-10-CM

## 2020-12-12 DIAGNOSIS — I10 ESSENTIAL HYPERTENSION: ICD-10-CM

## 2020-12-12 DIAGNOSIS — E11.65 UNCONTROLLED TYPE 2 DIABETES MELLITUS WITH HYPERGLYCEMIA (HCC): ICD-10-CM

## 2020-12-12 LAB
ALBUMIN SERPL BCP-MCNC: 4.5 G/DL (ref 3.2–4.9)
ALBUMIN/GLOB SERPL: 1.6 G/DL
ALP SERPL-CCNC: 48 U/L (ref 30–99)
ALT SERPL-CCNC: 41 U/L (ref 2–50)
ANION GAP SERPL CALC-SCNC: 12 MMOL/L (ref 7–16)
AST SERPL-CCNC: 29 U/L (ref 12–45)
BASOPHILS # BLD AUTO: 0.9 % (ref 0–1.8)
BASOPHILS # BLD: 0.05 K/UL (ref 0–0.12)
BILIRUB SERPL-MCNC: 0.5 MG/DL (ref 0.1–1.5)
BUN SERPL-MCNC: 13 MG/DL (ref 8–22)
CALCIUM SERPL-MCNC: 10 MG/DL (ref 8.5–10.5)
CHLORIDE SERPL-SCNC: 102 MMOL/L (ref 96–112)
CHOLEST SERPL-MCNC: 160 MG/DL (ref 100–199)
CO2 SERPL-SCNC: 25 MMOL/L (ref 20–33)
CREAT SERPL-MCNC: 0.83 MG/DL (ref 0.5–1.4)
CREAT UR-MCNC: 32.38 MG/DL
EOSINOPHIL # BLD AUTO: 0.11 K/UL (ref 0–0.51)
EOSINOPHIL NFR BLD: 1.9 % (ref 0–6.9)
ERYTHROCYTE [DISTWIDTH] IN BLOOD BY AUTOMATED COUNT: 46.7 FL (ref 35.9–50)
EST. AVERAGE GLUCOSE BLD GHB EST-MCNC: 169 MG/DL
FASTING STATUS PATIENT QL REPORTED: NORMAL
GLOBULIN SER CALC-MCNC: 2.9 G/DL (ref 1.9–3.5)
GLUCOSE SERPL-MCNC: 121 MG/DL (ref 65–99)
HBA1C MFR BLD: 7.5 % (ref 0–5.6)
HCT VFR BLD AUTO: 46.1 % (ref 37–47)
HDLC SERPL-MCNC: 52 MG/DL
HGB BLD-MCNC: 14.7 G/DL (ref 12–16)
IMM GRANULOCYTES # BLD AUTO: 0.01 K/UL (ref 0–0.11)
IMM GRANULOCYTES NFR BLD AUTO: 0.2 % (ref 0–0.9)
LDLC SERPL CALC-MCNC: 83 MG/DL
LYMPHOCYTES # BLD AUTO: 2.24 K/UL (ref 1–4.8)
LYMPHOCYTES NFR BLD: 39.6 % (ref 22–41)
MCH RBC QN AUTO: 30.6 PG (ref 27–33)
MCHC RBC AUTO-ENTMCNC: 31.9 G/DL (ref 33.6–35)
MCV RBC AUTO: 96 FL (ref 81.4–97.8)
MICROALBUMIN UR-MCNC: <1.2 MG/DL
MICROALBUMIN/CREAT UR: NORMAL MG/G (ref 0–30)
MONOCYTES # BLD AUTO: 0.38 K/UL (ref 0–0.85)
MONOCYTES NFR BLD AUTO: 6.7 % (ref 0–13.4)
NEUTROPHILS # BLD AUTO: 2.87 K/UL (ref 2–7.15)
NEUTROPHILS NFR BLD: 50.7 % (ref 44–72)
NRBC # BLD AUTO: 0 K/UL
NRBC BLD-RTO: 0 /100 WBC
PLATELET # BLD AUTO: 281 K/UL (ref 164–446)
PMV BLD AUTO: 10.9 FL (ref 9–12.9)
POTASSIUM SERPL-SCNC: 4.3 MMOL/L (ref 3.6–5.5)
PROT SERPL-MCNC: 7.4 G/DL (ref 6–8.2)
RBC # BLD AUTO: 4.8 M/UL (ref 4.2–5.4)
SODIUM SERPL-SCNC: 139 MMOL/L (ref 135–145)
TRIGL SERPL-MCNC: 125 MG/DL (ref 0–149)
WBC # BLD AUTO: 5.7 K/UL (ref 4.8–10.8)

## 2020-12-12 PROCEDURE — 83036 HEMOGLOBIN GLYCOSYLATED A1C: CPT

## 2020-12-12 PROCEDURE — 85025 COMPLETE CBC W/AUTO DIFF WBC: CPT

## 2020-12-12 PROCEDURE — 82043 UR ALBUMIN QUANTITATIVE: CPT

## 2020-12-12 PROCEDURE — 82570 ASSAY OF URINE CREATININE: CPT

## 2020-12-12 PROCEDURE — 80053 COMPREHEN METABOLIC PANEL: CPT

## 2020-12-12 PROCEDURE — 36415 COLL VENOUS BLD VENIPUNCTURE: CPT

## 2020-12-12 PROCEDURE — 80061 LIPID PANEL: CPT

## 2020-12-15 ENCOUNTER — APPOINTMENT (OUTPATIENT)
Dept: MEDICAL GROUP | Facility: PHYSICIAN GROUP | Age: 70
End: 2020-12-15
Payer: MEDICARE

## 2020-12-30 ENCOUNTER — TELEPHONE (OUTPATIENT)
Dept: MEDICAL GROUP | Facility: PHYSICIAN GROUP | Age: 70
End: 2020-12-30

## 2020-12-30 NOTE — TELEPHONE ENCOUNTER
ESTABLISHED PATIENT PRE-VISIT PLANNING     Patient was NOT contacted to complete PVP.     Note: Patient will not be contacted if there is no indication to call.     1.  Reviewed notes from the last few office visits within the medical group: Yes    2.  If any orders were placed at last visit or intended to be done for this visit (i.e. 6 mos follow-up), do we have Results/Consult Notes?         •  Labs - Labs ordered, completed on 12/12/2020 and results are in chart.  Note: If patient appointment is for lab review and patient did not complete labs, check with provider if OK to reschedule patient until labs completed.       •  Imaging - Imaging was not ordered at last office visit.       •  Referrals - No referrals were ordered at last office visit.    3. Is this appointment scheduled as a Hospital Follow-Up? No    4.  Immunizations were updated in Epic using Reconcile Outside Information activity? No    5.  Patient is due for the following Health Maintenance Topics:   Health Maintenance Due   Topic Date Due   • Annual Wellness Visit  1950   • MAMMOGRAM  09/07/2019   • RETINAL SCREENING  10/22/2019   • IMM INFLUENZA (1) 09/01/2020       - Patient plans to schedule appointment for Annual Wellness Visit (AWV), Immunizations: FLU, Mammogram and Retinal Eye Exam.    6.  AHA (Pulse8) form printed for Provider? N/A

## 2021-01-04 ENCOUNTER — APPOINTMENT (OUTPATIENT)
Dept: MEDICAL GROUP | Facility: PHYSICIAN GROUP | Age: 71
End: 2021-01-04
Payer: MEDICARE

## 2021-01-04 ENCOUNTER — OFFICE VISIT (OUTPATIENT)
Dept: MEDICAL GROUP | Facility: PHYSICIAN GROUP | Age: 71
End: 2021-01-04

## 2021-01-04 VITALS
HEART RATE: 75 BPM | SYSTOLIC BLOOD PRESSURE: 130 MMHG | DIASTOLIC BLOOD PRESSURE: 80 MMHG | WEIGHT: 151.9 LBS | TEMPERATURE: 97.1 F | OXYGEN SATURATION: 94 % | HEIGHT: 60 IN | BODY MASS INDEX: 29.82 KG/M2

## 2021-01-04 DIAGNOSIS — I10 ESSENTIAL HYPERTENSION: ICD-10-CM

## 2021-01-04 DIAGNOSIS — E78.5 DYSLIPIDEMIA: ICD-10-CM

## 2021-01-04 DIAGNOSIS — J01.90 ACUTE NON-RECURRENT SINUSITIS, UNSPECIFIED LOCATION: ICD-10-CM

## 2021-01-04 DIAGNOSIS — Z12.31 ENCOUNTER FOR SCREENING MAMMOGRAM FOR MALIGNANT NEOPLASM OF BREAST: ICD-10-CM

## 2021-01-04 DIAGNOSIS — E11.65 UNCONTROLLED TYPE 2 DIABETES MELLITUS WITH HYPERGLYCEMIA (HCC): ICD-10-CM

## 2021-01-04 PROCEDURE — 99214 OFFICE O/P EST MOD 30 MIN: CPT | Performed by: FAMILY MEDICINE

## 2021-01-04 RX ORDER — FLUTICASONE PROPIONATE 50 MCG
SPRAY, SUSPENSION (ML) NASAL
Qty: 16 G | Refills: 2 | Status: SHIPPED | OUTPATIENT
Start: 2021-01-04 | End: 2021-07-19 | Stop reason: SDUPTHER

## 2021-01-04 RX ORDER — AMOXICILLIN AND CLAVULANATE POTASSIUM 875; 125 MG/1; MG/1
1 TABLET, FILM COATED ORAL 2 TIMES DAILY
Qty: 20 TAB | Refills: 0 | Status: SHIPPED | OUTPATIENT
Start: 2021-01-04 | End: 2023-02-07

## 2021-01-04 ASSESSMENT — FIBROSIS 4 INDEX: FIB4 SCORE: 1.11

## 2021-01-04 NOTE — LETTER
[facility]  Renetta Khan M.D.  [address]  [place] [address]  Fax: [phone]   Authorization for Release/Disclosure of   Protected Health Information   Name: JUANCHO FREITAS : 1950 SSN: [id number]   Address: [address] [place] [address]   [place] [address] Phone:    [phone] (home)    I authorize the entity listed below to release/disclose the PHI below to:   Critical [facility] hospital/Renetta Khan M.D. and Renetta Khan M.D.   Provider or Entity Name:     Address   City, State, Zip   Phone:      Fax:     Reason for request: continuity of care   Information to be released:    [  ] LAST COLONOSCOPY,  including any PATH REPORT and follow-up  [  ] LAST FIT/COLOGUARD RESULT [  ] LAST DEXA  [  ] LAST MAMMOGRAM  [  ] LAST PAP  [  ] LAST LABS [  ] RETINA EXAM REPORT  [  ] IMMUNIZATION RECORDS  [  ] Release all info      [  ] Check here and initial the line next to each item to release ALL health information INCLUDING  _____ Care and treatment for drug and / or alcohol abuse  _____ HIV testing, infection status, or AIDS  _____ Genetic Testing    DATES OF SERVICE OR TIME PERIOD TO BE DISCLOSED: _____________  I understand and acknowledge that:  * This Authorization may be revoked at any time by you in writing, except if your health information has already been used or disclosed.  * Your health information that will be used or disclosed as a result of you signing this authorization could be re-disclosed by the recipient. If this occurs, your re-disclosed health information may no longer be protected by State or Federal laws.  * You may refuse to sign this Authorization. Your refusal will not affect your ability to obtain treatment.  * This Authorization becomes effective upon signing and will  on (date) __________.      If no date is indicated, this Authorization will  one (1) year from the signature date.    Name: Juancho Freitas    Signature:   Date:     2021       PLEASE FAX REQUESTED RECORDS  BACK TO: [phone]

## 2021-01-05 ENCOUNTER — TELEPHONE (OUTPATIENT)
Dept: MEDICAL GROUP | Facility: PHYSICIAN GROUP | Age: 71
End: 2021-01-05

## 2021-01-05 NOTE — TELEPHONE ENCOUNTER
I have already removed the penicillin as her allergy.  This was not a true allergy.  She is not allergic to penicillin.

## 2021-01-05 NOTE — PROGRESS NOTES
Subjective:      Bernadette Freitas is a 70 y.o. female who presents with Hypertension            HPI     Patient returns for follow-up of her medical problems as well as for concern about possible sinus infection.    She complains of sinus pressure on the right side of the nose radiates to the frontal area just above the right eye down to the right temple that has been ongoing for 1 to 2 weeks.  She has clear nasal discharge.  She feels stuffy/congested in the right nostril.  Denies any fever or chills.  Denies any cough.  She said her symptoms are the same as when she had maxillary sinusitis before.  She said she had her cataract surgery of both eyes last year.  She is sure that she already had her diabetic eye exam done by her ophthalmologist last year.  We do not have the report.    In terms of her diabetes mellitus type 2, we increase the Jardiance in August 2020 from 10 to 25 mg daily because the diabetes was still not adequately controlled.  She is also on maximum dose of Metformin 1000 mg twice a day.  She is tolerating both medications without side effects.  She admits she has not been good in watching her diet especially during the holiday season.    For her hypertension, this is still under control on losartan and metoprolol.    She continues to take atorvastatin for dyslipidemia without myalgias.    She continues to take thyroid replacement for hypothyroidism.    She is due for screening mammogram.    Past medical history, past surgical history, family history reviewed-no changes    Social history reviewed-no changes    Allergies reviewed-no changes    Medications reviewed-no changes    ROS     As per HPI, the rest are negative.       Objective:     /80 (BP Location: Left arm, Patient Position: Sitting, BP Cuff Size: Adult)   Pulse 75   Temp 36.2 °C (97.1 °F) (Temporal)   Ht 1.524 m (5')   Wt 68.9 kg (151 lb 14.4 oz)   LMP 01/20/2008   SpO2 94%   BMI 29.67 kg/m²      Physical Exam      Examined alert, awake, oriented, not in distress    Ears-both tympanic membranes intact bilaterally without evidence of infection  Nose-no obstruction, no discharge, tender right frontal and right maxillary sinuses  Throat-no erythema, tonsils not enlarged, no exudates  Neck-supple, no lymphadenopathy, no thyromegaly  Lungs-clear to auscultation, no rales, no wheezes  Heart-regular rate and rhythm, no murmur  Extremities-no edema, clubbing, cyanosis       Hospital Outpatient Visit on 12/12/2020   Component Date Value Ref Range Status   • Creatinine, Urine 12/12/2020 32.38  mg/dL Final   • Microalbumin, Urine Random 12/12/2020 <1.2  mg/dL Final   • Micro Alb Creat Ratio 12/12/2020 see below  0 - 30 mg/g Final    Comment: Unable to calculate the microalbumin/creatinine ratio due to  the microalbumin result or the urine creatinine result being  outside the measurement range of the analyzer.     • WBC 12/12/2020 5.7  4.8 - 10.8 K/uL Final   • RBC 12/12/2020 4.80  4.20 - 5.40 M/uL Final   • Hemoglobin 12/12/2020 14.7  12.0 - 16.0 g/dL Final   • Hematocrit 12/12/2020 46.1  37.0 - 47.0 % Final   • MCV 12/12/2020 96.0  81.4 - 97.8 fL Final   • MCH 12/12/2020 30.6  27.0 - 33.0 pg Final   • MCHC 12/12/2020 31.9* 33.6 - 35.0 g/dL Final   • RDW 12/12/2020 46.7  35.9 - 50.0 fL Final   • Platelet Count 12/12/2020 281  164 - 446 K/uL Final   • MPV 12/12/2020 10.9  9.0 - 12.9 fL Final   • Neutrophils-Polys 12/12/2020 50.70  44.00 - 72.00 % Final   • Lymphocytes 12/12/2020 39.60  22.00 - 41.00 % Final   • Monocytes 12/12/2020 6.70  0.00 - 13.40 % Final   • Eosinophils 12/12/2020 1.90  0.00 - 6.90 % Final   • Basophils 12/12/2020 0.90  0.00 - 1.80 % Final   • Immature Granulocytes 12/12/2020 0.20  0.00 - 0.90 % Final   • Nucleated RBC 12/12/2020 0.00  /100 WBC Final   • Neutrophils (Absolute) 12/12/2020 2.87  2.00 - 7.15 K/uL Final    Includes immature neutrophils, if present.   • Lymphs (Absolute) 12/12/2020 2.24  1.00 - 4.80  K/uL Final   • Monos (Absolute) 12/12/2020 0.38  0.00 - 0.85 K/uL Final   • Eos (Absolute) 12/12/2020 0.11  0.00 - 0.51 K/uL Final   • Baso (Absolute) 12/12/2020 0.05  0.00 - 0.12 K/uL Final   • Immature Granulocytes (abs) 12/12/2020 0.01  0.00 - 0.11 K/uL Final   • NRBC (Absolute) 12/12/2020 0.00  K/uL Final   • Glycohemoglobin 12/12/2020 7.5* 0.0 - 5.6 % Final    Comment: Increased risk for diabetes:  5.7 -6.4%  Diabetes:  >6.4%  Glycemic control for adults with diabetes:  <7.0%  The above interpretations are per ADA guidelines.  Diagnosis  of diabetes mellitus on the basis of elevated Hemoglobin A1c  should be confirmed by repeating the Hb A1c test.     • Est Avg Glucose 12/12/2020 169  mg/dL Final    Comment: The eAG calculation is based on the A1c-Derived Daily Glucose  (ADAG) study.  See the ADA's website for additional information.     • Sodium 12/12/2020 139  135 - 145 mmol/L Final   • Potassium 12/12/2020 4.3  3.6 - 5.5 mmol/L Final   • Chloride 12/12/2020 102  96 - 112 mmol/L Final   • Co2 12/12/2020 25  20 - 33 mmol/L Final   • Anion Gap 12/12/2020 12.0  7.0 - 16.0 Final   • Glucose 12/12/2020 121* 65 - 99 mg/dL Final   • Bun 12/12/2020 13  8 - 22 mg/dL Final   • Creatinine 12/12/2020 0.83  0.50 - 1.40 mg/dL Final   • Calcium 12/12/2020 10.0  8.5 - 10.5 mg/dL Final   • AST(SGOT) 12/12/2020 29  12 - 45 U/L Final   • ALT(SGPT) 12/12/2020 41  2 - 50 U/L Final   • Alkaline Phosphatase 12/12/2020 48  30 - 99 U/L Final   • Total Bilirubin 12/12/2020 0.5  0.1 - 1.5 mg/dL Final   • Albumin 12/12/2020 4.5  3.2 - 4.9 g/dL Final   • Total Protein 12/12/2020 7.4  6.0 - 8.2 g/dL Final   • Globulin 12/12/2020 2.9  1.9 - 3.5 g/dL Final   • A-G Ratio 12/12/2020 1.6  g/dL Final   • Cholesterol,Tot 12/12/2020 160  100 - 199 mg/dL Final   • Triglycerides 12/12/2020 125  0 - 149 mg/dL Final   • HDL 12/12/2020 52  >=40 mg/dL Final   • LDL 12/12/2020 83  <100 mg/dL Final   • Fasting Status 12/12/2020 Fasting   Final   • GFR If   12/12/2020 >60  >60 mL/min/1.73 m 2 Final   • GFR If Non  12/12/2020 >60  >60 mL/min/1.73 m 2 Final          Assessment/Plan:        1. Acute non-recurrent sinusitis, unspecified location  I will treat her for acute sinusitis most likely acute right maxillary and right frontal sinusitis with Augmentin 875 mg 1 tablet twice a day.  We have record that she is allergic to penicillin but according to patient she had vomiting when she took penicillin but it was not an allergic reaction.  I took off penicillin from her allergy list.  Advise increase p.o. fluids and rest.  She will continue using the Flonase nasal spray and refills were sent to the pharmacy.  - amoxicillin-clavulanate (AUGMENTIN) 875-125 MG Tab; Take 1 Tab by mouth 2 times a day.  Dispense: 20 Tab; Refill: 0    2. Uncontrolled type 2 diabetes mellitus with hyperglycemia (HCC)  Hemoglobin A1c is higher than before.  She is maxed out on Metformin and Jardiance.  She does not want me to add a third agent yet.  I will wait until I see her in 4 months and if not improved or if this is worse then we will add a third medication.  Advised work harder on diet, exercise and weight loss.  - Lipid Profile; Future  - Comp Metabolic Panel; Future  - HEMOGLOBIN A1C; Future    3. Essential hypertension  Controlled on her medications.  - Lipid Profile; Future  - Comp Metabolic Panel; Future  - HEMOGLOBIN A1C; Future    4. Dyslipidemia  All at target on atorvastatin.  - Lipid Profile; Future  - Comp Metabolic Panel; Future  - HEMOGLOBIN A1C; Future    5. Encounter for screening mammogram for malignant neoplasm of breast  She is due for screening mammogram and this was ordered.  - MA-SCREENING MAMMO BILAT W/CAD; Future    Follow-up in 4 months.      Please note that this dictation was created using voice recognition software. I have worked with consultants from the vendor as well as technical experts from [employer] to optimize the  interface. I have made every reasonable attempt to correct obvious errors, but I expect that there are errors of grammar and possibly content I did not discover before finalizing the note.

## 2021-01-23 ENCOUNTER — HOSPITAL ENCOUNTER (OUTPATIENT)
Dept: RADIOLOGY | Facility: MEDICAL CENTER | Age: 71
End: 2021-01-23
Attending: FAMILY MEDICINE
Payer: MEDICARE

## 2021-01-23 DIAGNOSIS — Z12.31 ENCOUNTER FOR SCREENING MAMMOGRAM FOR MALIGNANT NEOPLASM OF BREAST: ICD-10-CM

## 2021-01-23 PROCEDURE — 77067 SCR MAMMO BI INCL CAD: CPT

## 2021-01-23 PROCEDURE — 77063 BREAST TOMOSYNTHESIS BI: CPT

## 2021-02-02 ENCOUNTER — APPOINTMENT (OUTPATIENT)
Dept: RADIOLOGY | Facility: MEDICAL CENTER | Age: 71
End: 2021-02-02
Attending: EMERGENCY MEDICINE
Payer: MEDICARE

## 2021-02-02 ENCOUNTER — HOSPITAL ENCOUNTER (EMERGENCY)
Facility: MEDICAL CENTER | Age: 71
End: 2021-02-02
Attending: EMERGENCY MEDICINE
Payer: MEDICARE

## 2021-02-02 VITALS
RESPIRATION RATE: 15 BRPM | BODY MASS INDEX: 28.6 KG/M2 | HEART RATE: 69 BPM | SYSTOLIC BLOOD PRESSURE: 129 MMHG | TEMPERATURE: 97.4 F | DIASTOLIC BLOOD PRESSURE: 66 MMHG | WEIGHT: 151.46 LBS | OXYGEN SATURATION: 94 % | HEIGHT: 61 IN

## 2021-02-02 DIAGNOSIS — R10.32 LEFT LOWER QUADRANT ABDOMINAL PAIN: ICD-10-CM

## 2021-02-02 DIAGNOSIS — K57.92 DIVERTICULITIS: ICD-10-CM

## 2021-02-02 LAB
ALBUMIN SERPL BCP-MCNC: 4.6 G/DL (ref 3.2–4.9)
ALBUMIN/GLOB SERPL: 1.4 G/DL
ALP SERPL-CCNC: 55 U/L (ref 30–99)
ALT SERPL-CCNC: 37 U/L (ref 2–50)
ANION GAP SERPL CALC-SCNC: 18 MMOL/L (ref 7–16)
AST SERPL-CCNC: 27 U/L (ref 12–45)
BASOPHILS # BLD AUTO: 0.4 % (ref 0–1.8)
BASOPHILS # BLD: 0.06 K/UL (ref 0–0.12)
BILIRUB SERPL-MCNC: 0.5 MG/DL (ref 0.1–1.5)
BUN SERPL-MCNC: 15 MG/DL (ref 8–22)
CALCIUM SERPL-MCNC: 10.1 MG/DL (ref 8.5–10.5)
CHLORIDE SERPL-SCNC: 100 MMOL/L (ref 96–112)
CO2 SERPL-SCNC: 20 MMOL/L (ref 20–33)
CREAT SERPL-MCNC: 0.8 MG/DL (ref 0.5–1.4)
EOSINOPHIL # BLD AUTO: 0.03 K/UL (ref 0–0.51)
EOSINOPHIL NFR BLD: 0.2 % (ref 0–6.9)
ERYTHROCYTE [DISTWIDTH] IN BLOOD BY AUTOMATED COUNT: 44.9 FL (ref 35.9–50)
GLOBULIN SER CALC-MCNC: 3.2 G/DL (ref 1.9–3.5)
GLUCOSE SERPL-MCNC: 140 MG/DL (ref 65–99)
HCT VFR BLD AUTO: 45.8 % (ref 37–47)
HGB BLD-MCNC: 14.8 G/DL (ref 12–16)
IMM GRANULOCYTES # BLD AUTO: 0.07 K/UL (ref 0–0.11)
IMM GRANULOCYTES NFR BLD AUTO: 0.5 % (ref 0–0.9)
LYMPHOCYTES # BLD AUTO: 2.36 K/UL (ref 1–4.8)
LYMPHOCYTES NFR BLD: 17.4 % (ref 22–41)
MCH RBC QN AUTO: 30.4 PG (ref 27–33)
MCHC RBC AUTO-ENTMCNC: 32.3 G/DL (ref 33.6–35)
MCV RBC AUTO: 94 FL (ref 81.4–97.8)
MONOCYTES # BLD AUTO: 0.97 K/UL (ref 0–0.85)
MONOCYTES NFR BLD AUTO: 7.1 % (ref 0–13.4)
NEUTROPHILS # BLD AUTO: 10.08 K/UL (ref 2–7.15)
NEUTROPHILS NFR BLD: 74.4 % (ref 44–72)
NRBC # BLD AUTO: 0 K/UL
NRBC BLD-RTO: 0 /100 WBC
PLATELET # BLD AUTO: 289 K/UL (ref 164–446)
PMV BLD AUTO: 11.4 FL (ref 9–12.9)
POTASSIUM SERPL-SCNC: 3.7 MMOL/L (ref 3.6–5.5)
PROT SERPL-MCNC: 7.8 G/DL (ref 6–8.2)
RBC # BLD AUTO: 4.87 M/UL (ref 4.2–5.4)
SODIUM SERPL-SCNC: 138 MMOL/L (ref 135–145)
WBC # BLD AUTO: 13.6 K/UL (ref 4.8–10.8)

## 2021-02-02 PROCEDURE — 36415 COLL VENOUS BLD VENIPUNCTURE: CPT

## 2021-02-02 PROCEDURE — 700105 HCHG RX REV CODE 258: Performed by: EMERGENCY MEDICINE

## 2021-02-02 PROCEDURE — 80053 COMPREHEN METABOLIC PANEL: CPT

## 2021-02-02 PROCEDURE — 99284 EMERGENCY DEPT VISIT MOD MDM: CPT

## 2021-02-02 PROCEDURE — 700117 HCHG RX CONTRAST REV CODE 255: Performed by: EMERGENCY MEDICINE

## 2021-02-02 PROCEDURE — 74177 CT ABD & PELVIS W/CONTRAST: CPT

## 2021-02-02 PROCEDURE — 700111 HCHG RX REV CODE 636 W/ 250 OVERRIDE (IP): Performed by: EMERGENCY MEDICINE

## 2021-02-02 PROCEDURE — 96365 THER/PROPH/DIAG IV INF INIT: CPT

## 2021-02-02 PROCEDURE — 85025 COMPLETE CBC W/AUTO DIFF WBC: CPT

## 2021-02-02 RX ORDER — METRONIDAZOLE 500 MG/1
500 TABLET ORAL 3 TIMES DAILY
Qty: 21 TAB | Refills: 0 | Status: SHIPPED | OUTPATIENT
Start: 2021-02-02 | End: 2021-02-09

## 2021-02-02 RX ORDER — CIPROFLOXACIN 500 MG/1
500 TABLET, FILM COATED ORAL 2 TIMES DAILY
Qty: 14 TAB | Refills: 0 | Status: SHIPPED | OUTPATIENT
Start: 2021-02-02 | End: 2021-02-09

## 2021-02-02 RX ORDER — ONDANSETRON 4 MG/1
4 TABLET, ORALLY DISINTEGRATING ORAL EVERY 8 HOURS PRN
Qty: 10 TAB | Refills: 1 | Status: SHIPPED | OUTPATIENT
Start: 2021-02-02 | End: 2023-02-07

## 2021-02-02 RX ADMIN — CEFTRIAXONE SODIUM 2 G: 2 INJECTION, POWDER, FOR SOLUTION INTRAMUSCULAR; INTRAVENOUS at 14:07

## 2021-02-02 RX ADMIN — IOHEXOL 80 ML: 350 INJECTION, SOLUTION INTRAVENOUS at 13:43

## 2021-02-02 ASSESSMENT — FIBROSIS 4 INDEX: FIB4 SCORE: 1.11

## 2021-02-02 NOTE — ED NOTES
ASSIST RN NOTE**  Call to CT to inquire eta.  CT reports pt does not need oral contrast and they are awaiting labs.  Call to lab to determine ETA as labs in process for 2 hours.  Lab states CMP remains on the line and CBC did not run.  They will place on machine at this time.

## 2021-02-02 NOTE — ED TRIAGE NOTES
"Bernadette Freitas 70 y.o. female ambulatory to triage with family for     Chief Complaint   Patient presents with   • LLQ Pain     L sided groin, LLQ and L pelvic pain onset yesterday.  Pt reports history of diverticulitis in 2008 and states this feels the same.   • Rectal Pain     onset yesterday, denies rectal bleeding, denies diarrhea     Pt in NAD.  Pt denies n/v/d, denies fever or other c/c.    /81   Pulse 98   Temp 36.2 °C (97.1 °F)   Resp 16   Ht 1.549 m (5' 1\")   Wt 68.7 kg (151 lb 7.3 oz)   LMP 01/20/2008   SpO2 96%   BMI 28.62 kg/m²   Pt returned to the lobby to await bed assignment.  Advised to return to the triage desk for any changes/concerns.  "

## 2021-02-02 NOTE — ED NOTES
Pt states understanding of dc instructions and f/u care. Pt able to amb out w/ steady gait. Family driving home.

## 2021-02-02 NOTE — ED PROVIDER NOTES
ED Provider Note    CHIEF COMPLAINT  Chief Complaint   Patient presents with   • LLQ Pain     L sided groin, LLQ and L pelvic pain onset yesterday.  Pt reports history of diverticulitis in  and states this feels the same.   • Rectal Pain     onset yesterday, denies rectal bleeding, denies diarrhea       HPI  Bernadette Freitas is a 70 y.o. female who presents to the emergency room today with complaints of left lower quadrant abdominal pain.  Patient states this started yesterday.  She says that she has a history of diverticulitis is feels similar.  She has had chills at home but no fever.  When she has a bowel movement she feels the pain increased over the left lower quadrant, despite nurses note she denies rectal pain.  No blood in her stool.  The pain is increased today prompting her to come to emergency room currently rated at a 7/10.  She has had slight nausea and no changes to bowel bladder other than stated above.    REVIEW OF SYSTEMS  See HPI for further details. All other systems are negative.     PAST MEDICAL HISTORY  Past Medical History:   Diagnosis Date   • Depression    • Dyslipidemia    • HTN (hypertension)    • Hypercholesteremia    • Hypothyroidism    • Impaired fasting glucose        FAMILY HISTORY  [unfilled]    SOCIAL HISTORY  Social History     Socioeconomic History   • Marital status: Single     Spouse name: Not on file   • Number of children: 3   • Years of education: Not on file   • Highest education level: Not on file   Occupational History   • Occupation: retired     Employer: [employer]   Social Needs   • Financial resource strain: Not on file   • Food insecurity     Worry: Not on file     Inability: Not on file   • Transportation needs     Medical: Not on file     Non-medical: Not on file   Tobacco Use   • Smoking status: Former Smoker     Packs/day: 0.00     Quit date: 1998     Years since quittin.2   • Smokeless tobacco: Never Used   • Tobacco comment: quit , smoked  "1-2 cig/day for 5 yrs   Substance and Sexual Activity   • Alcohol use: Yes     Alcohol/week: 0.0 oz     Comment: rare   • Drug use: No   • Sexual activity: Yes     Partners: Male   Lifestyle   • Physical activity     Days per week: Not on file     Minutes per session: Not on file   • Stress: Not on file   Relationships   • Social connections     Talks on phone: Not on file     Gets together: Not on file     Attends [employer] service: Not on file     Active member of club or organization: Not on file     Attends meetings of clubs or organizations: Not on file     Relationship status: Not on file   • Intimate partner violence     Fear of current or ex partner: Not on file     Emotionally abused: Not on file     Physically abused: Not on file     Forced sexual activity: Not on file   Other Topics Concern   • Not on file   Social History Narrative   • Not on file       SURGICAL HISTORY  Past Surgical History:   Procedure Laterality Date   • COLONOSCOPY WITH POLYP  10/30/2017    polyp asc colon- adenoma, diverticulosis sigmoid and dec colon,int hemorhoids   • COLONOSCOPY WITH POLYP  11/19/2013    2 polyps-benign polypoid and adenomatous,severe diverticulosis entire colon, medium external hemorrhoids,   • BREAST BIOPSY  3/13    left breast-benign   • COLONOSCOPY  2008    polyp- tubular adenoma - [facility]   • GASTROSCOPY  2008    acid reflux   • CATARACT EXTRACTION WITH IOL Bilateral 03/03/2020, 5/4/2020   • PRIMARY C SECTION      x1   • THYROIDECTOMY TOTAL      toxic goiter       CURRENT MEDICATIONS  Home Medications    **Home medications have not yet been reviewed for this encounter**         ALLERGIES  No Known Allergies    PHYSICAL EXAM  VITAL SIGNS: /66   Pulse 69   Temp 36.3 °C (97.4 °F) (Temporal)   Resp 15   Ht 1.549 m (5' 1\")   Wt 68.7 kg (151 lb 7.3 oz)   LMP 01/20/2008   SpO2 94%   BMI 28.62 kg/m²       Constitutional: Well developed, Well nourished,  acute distress, Non-toxic appearance. "   HENT: Normocephalic, Atraumatic, Bilateral external ears normal, Oropharynx moist, No oral exudates, Nose normal.   Eyes: PERRLA, EOMI, Conjunctiva normal, No discharge.   Neck: Normal range of motion, No tenderness, Supple, No stridor.   Lymphatic: No lymphadenopathy noted.   Cardiovascular: Normal heart rate, Normal rhythm, No murmurs, No rubs, No gallops.   Thorax & Lungs: Normal breath sounds, No respiratory distress, No wheezing, No chest tenderness.   Abdomen: Bowel sounds normal, Soft, left lower quadrant tenderness, No masses, No pulsatile masses.  No rebound, guarding or peritoneal signs noted.  Skin: Warm, Dry, No erythema, No rash.   Back: No tenderness, No CVA tenderness.     Extremities: Intact distal pulses, No edema, No tenderness, No cyanosis, No clubbing.   Musculoskeletal: Good range of motion in all major joints. No tenderness to palpation or major deformities noted.   Neurologic: Alert & oriented x 3, Normal motor function, Normal sensory function, No focal deficits noted.   Psychiatric: Affect normal, Judgment normal, Mood normal.         RADIOLOGY/PROCEDURES  CT-ABDOMEN-PELVIS WITH   Final Result      Findings consistent with sigmoid diverticulitis without evidence of abscess or free air.      Hepatic steatosis.      Coarse calcified plaque in the aorta without aneurysm.            COURSE & MEDICAL DECISION MAKING  Pertinent Labs & Imaging studies reviewed. (See chart for details)  Patient has diverticulitis sigmoid consistent with her symptoms started on Rocephin and will be placed on Cipro/Flagyl.  Tylenol Motrin for pain.  She is resting comfortably at this time and her abdomen is soft, supple, nonsurgical on reexamination.  She will follow-up with her primary care physician Dr. Khan next 24 to 48 hours.  Return if fever, increasing pain, vomiting persistent or worsening symptoms over the next 12 to 24 hours otherwise will follow up as recommended she is discharged in stable condition  improved as above to home.  Daughter at bedside verbalized understand instructions also.    FINAL IMPRESSION  1.  Acute left lower quadrant pete pain  2.  Acute sigmoid diverticulitis  3.         Electronically signed by: Jeff Mendoza D.O., 2/2/2021 3:12 PM

## 2021-03-03 DIAGNOSIS — Z23 NEED FOR VACCINATION: ICD-10-CM

## 2021-03-12 ENCOUNTER — IMMUNIZATION (OUTPATIENT)
Dept: FAMILY PLANNING/WOMEN'S HEALTH CLINIC | Facility: IMMUNIZATION CENTER | Age: 71
End: 2021-03-12
Attending: INTERNAL MEDICINE
Payer: MEDICARE

## 2021-03-12 DIAGNOSIS — Z23 ENCOUNTER FOR VACCINATION: Primary | ICD-10-CM

## 2021-03-12 DIAGNOSIS — Z23 NEED FOR VACCINATION: ICD-10-CM

## 2021-03-12 PROCEDURE — 91301 MODERNA SARS-COV-2 VACCINE: CPT

## 2021-03-12 PROCEDURE — 0011A MODERNA SARS-COV-2 VACCINE: CPT

## 2021-04-10 ENCOUNTER — HOSPITAL ENCOUNTER (OUTPATIENT)
Dept: LAB | Facility: MEDICAL CENTER | Age: 71
End: 2021-04-10
Attending: FAMILY MEDICINE
Payer: MEDICARE

## 2021-04-10 ENCOUNTER — IMMUNIZATION (OUTPATIENT)
Dept: FAMILY PLANNING/WOMEN'S HEALTH CLINIC | Facility: IMMUNIZATION CENTER | Age: 71
End: 2021-04-10
Attending: INTERNAL MEDICINE
Payer: MEDICARE

## 2021-04-10 DIAGNOSIS — I10 ESSENTIAL HYPERTENSION: ICD-10-CM

## 2021-04-10 DIAGNOSIS — E78.5 DYSLIPIDEMIA: ICD-10-CM

## 2021-04-10 DIAGNOSIS — Z23 ENCOUNTER FOR VACCINATION: Primary | ICD-10-CM

## 2021-04-10 DIAGNOSIS — E11.65 UNCONTROLLED TYPE 2 DIABETES MELLITUS WITH HYPERGLYCEMIA (HCC): ICD-10-CM

## 2021-04-10 LAB
ALBUMIN SERPL BCP-MCNC: 4.6 G/DL (ref 3.2–4.9)
ALBUMIN/GLOB SERPL: 1.8 G/DL
ALP SERPL-CCNC: 45 U/L (ref 30–99)
ALT SERPL-CCNC: 38 U/L (ref 2–50)
ANION GAP SERPL CALC-SCNC: 12 MMOL/L (ref 7–16)
AST SERPL-CCNC: 30 U/L (ref 12–45)
BILIRUB SERPL-MCNC: 0.4 MG/DL (ref 0.1–1.5)
BUN SERPL-MCNC: 12 MG/DL (ref 8–22)
CALCIUM SERPL-MCNC: 9.6 MG/DL (ref 8.5–10.5)
CHLORIDE SERPL-SCNC: 102 MMOL/L (ref 96–112)
CHOLEST SERPL-MCNC: 148 MG/DL (ref 100–199)
CO2 SERPL-SCNC: 26 MMOL/L (ref 20–33)
CREAT SERPL-MCNC: 0.78 MG/DL (ref 0.5–1.4)
EST. AVERAGE GLUCOSE BLD GHB EST-MCNC: 203 MG/DL
GLOBULIN SER CALC-MCNC: 2.6 G/DL (ref 1.9–3.5)
GLUCOSE SERPL-MCNC: 96 MG/DL (ref 65–99)
HBA1C MFR BLD: 8.7 % (ref 4–5.6)
HDLC SERPL-MCNC: 57 MG/DL
LDLC SERPL CALC-MCNC: 60 MG/DL
POTASSIUM SERPL-SCNC: 4.5 MMOL/L (ref 3.6–5.5)
PROT SERPL-MCNC: 7.2 G/DL (ref 6–8.2)
SODIUM SERPL-SCNC: 140 MMOL/L (ref 135–145)
TRIGL SERPL-MCNC: 154 MG/DL (ref 0–149)

## 2021-04-10 PROCEDURE — 0012A MODERNA SARS-COV-2 VACCINE: CPT

## 2021-04-10 PROCEDURE — 91301 MODERNA SARS-COV-2 VACCINE: CPT

## 2021-04-10 PROCEDURE — 36415 COLL VENOUS BLD VENIPUNCTURE: CPT | Mod: GA

## 2021-04-10 PROCEDURE — 83036 HEMOGLOBIN GLYCOSYLATED A1C: CPT | Mod: GA

## 2021-04-10 PROCEDURE — 80053 COMPREHEN METABOLIC PANEL: CPT

## 2021-04-10 PROCEDURE — 80061 LIPID PANEL: CPT

## 2021-04-12 ENCOUNTER — OFFICE VISIT (OUTPATIENT)
Dept: MEDICAL GROUP | Facility: PHYSICIAN GROUP | Age: 71
End: 2021-04-12
Payer: MEDICARE

## 2021-04-12 VITALS
TEMPERATURE: 98 F | SYSTOLIC BLOOD PRESSURE: 142 MMHG | WEIGHT: 152.34 LBS | HEIGHT: 60 IN | HEART RATE: 68 BPM | OXYGEN SATURATION: 92 % | BODY MASS INDEX: 29.91 KG/M2 | DIASTOLIC BLOOD PRESSURE: 84 MMHG

## 2021-04-12 DIAGNOSIS — E11.65 UNCONTROLLED TYPE 2 DIABETES MELLITUS WITH HYPERGLYCEMIA (HCC): ICD-10-CM

## 2021-04-12 DIAGNOSIS — E78.5 DYSLIPIDEMIA: ICD-10-CM

## 2021-04-12 DIAGNOSIS — E03.9 HYPOTHYROIDISM, UNSPECIFIED TYPE: ICD-10-CM

## 2021-04-12 DIAGNOSIS — I10 ESSENTIAL HYPERTENSION: ICD-10-CM

## 2021-04-12 PROCEDURE — 99214 OFFICE O/P EST MOD 30 MIN: CPT | Performed by: FAMILY MEDICINE

## 2021-04-12 RX ORDER — LOSARTAN POTASSIUM 100 MG/1
TABLET ORAL
Qty: 90 TABLET | Refills: 1 | Status: SHIPPED | OUTPATIENT
Start: 2021-04-12 | End: 2021-10-07

## 2021-04-12 RX ORDER — GLIPIZIDE 5 MG/1
5 TABLET, FILM COATED, EXTENDED RELEASE ORAL DAILY
Qty: 90 TABLET | Refills: 1 | Status: SHIPPED | OUTPATIENT
Start: 2021-04-12 | End: 2021-10-07

## 2021-04-12 ASSESSMENT — FIBROSIS 4 INDEX: FIB4 SCORE: 1.16

## 2021-04-12 NOTE — PATIENT INSTRUCTIONS

## 2021-04-13 NOTE — PROGRESS NOTES
Subjective:      Bernadette Freitas is a 70 y.o. female who presents with Diabetes            HPI     Patient returns for follow-up of her medical problems.    In the interim, she was seen at [facility] ER on 2/2/2021 for left lower quadrant pain and rectal pain.  She had CT scan of the abdomen and pelvis that showed sigmoid diverticulitis without evidence of abscess or free air.  She was given Rocephin x1 dose and was sent home on Cipro and Flagyl.  Her symptoms resolved completely.  The last episode prior to this 1 was in 2008.  Her last colonoscopy was in October 2017 that showed sigmoid colon and descending colon diverticulosis and polyps in the cecum and mid ascending colon which were tubular adenomas and internal hemorrhoids.  She will have another colonoscopy in 2022.    For her diabetes mellitus type 2, I saw her 3 months ago and her hemoglobin A1c increased from 7.4-7.5.  She did not want to add a third agent and she continues to take Metformin and Jardiance and she is already on maximum doses of them.  Denies any side effects.  She claims she is not very good in watching her diet in terms of the carbohydrates and sweets.  She said fasting blood sugar runs from as low as 120  to as high as 280.  Blood work was done before this visit.    For her hypertension, her blood pressure is borderline elevated at 142/84.  She continues to take metoprolol XL 50 mg daily and losartan 100 mg daily.    In terms of the dyslipidemia, she continues to take atorvastatin 40 mg at bedtime regularly.    She continues to take thyroid replacement for hypothyroidism.    Past medical history, past surgical history, family history reviewed-no changes    Social history reviewed-no changes    Allergies reviewed-no changes    Medications reviewed-no changes    ROS     As per HPI, the rest are negative.       Objective:     /84   Pulse 68   Temp 36.7 °C (98 °F) (Temporal)   Ht 1.524 m (5')   Wt 69.1 kg (152 lb 5.4  oz)   LMP 01/20/2008   SpO2 92%   BMI 29.75 kg/m²      Physical Exam     Examined alert, awake, oriented, not in distress    Neck-supple, no lymphadenopathy, no thyromegaly  Lungs-clear to auscultation, no rales, no wheezes  Heart-regular rate and rhythm, no murmur  Extremities-no edema, clubbing, cyanosis  Monofilament testing with a 10 gram force: sensation intact: intact bilaterally  Visual Inspection: Feet without maceration, ulcers, fissures.  Pedal pulses: intact bilaterally       Hospital Outpatient Visit on 04/10/2021   Component Date Value Ref Range Status   • Glycohemoglobin 04/10/2021 8.7* 4.0 - 5.6 % Final    Comment: Increased risk for diabetes:  5.7 -6.4%  Diabetes:  >6.4%  Glycemic control for adults with diabetes:  <7.0%  The above interpretations are per ADA guidelines.  Diagnosis  of diabetes mellitus on the basis of elevated Hemoglobin A1c  should be confirmed by repeating the Hb A1c test.     • Est Avg Glucose 04/10/2021 203  mg/dL Final    Comment: The eAG calculation is based on the A1c-Derived Daily Glucose  (ADAG) study.  See the ADA's website for additional information.     • Sodium 04/10/2021 140  135 - 145 mmol/L Final   • Potassium 04/10/2021 4.5  3.6 - 5.5 mmol/L Final   • Chloride 04/10/2021 102  96 - 112 mmol/L Final   • Co2 04/10/2021 26  20 - 33 mmol/L Final   • Anion Gap 04/10/2021 12.0  7.0 - 16.0 Final   • Glucose 04/10/2021 96  65 - 99 mg/dL Final   • Bun 04/10/2021 12  8 - 22 mg/dL Final   • Creatinine 04/10/2021 0.78  0.50 - 1.40 mg/dL Final   • Calcium 04/10/2021 9.6  8.5 - 10.5 mg/dL Final   • AST(SGOT) 04/10/2021 30  12 - 45 U/L Final   • ALT(SGPT) 04/10/2021 38  2 - 50 U/L Final   • Alkaline Phosphatase 04/10/2021 45  30 - 99 U/L Final   • Total Bilirubin 04/10/2021 0.4  0.1 - 1.5 mg/dL Final   • Albumin 04/10/2021 4.6  3.2 - 4.9 g/dL Final   • Total Protein 04/10/2021 7.2  6.0 - 8.2 g/dL Final   • Globulin 04/10/2021 2.6  1.9 - 3.5 g/dL Final   • A-G Ratio 04/10/2021  1.8  g/dL Final   • Cholesterol,Tot 04/10/2021 148  100 - 199 mg/dL Final   • Triglycerides 04/10/2021 154* 0 - 149 mg/dL Final   • HDL 04/10/2021 57  >=40 mg/dL Final   • LDL 04/10/2021 60  <100 mg/dL Final   • GFR If  04/10/2021 >60  >60 mL/min/1.73 m 2 Final   • GFR If Non  04/10/2021 >60  >60 mL/min/1.73 m 2 Final          Assessment/Plan:        1. Uncontrolled type 2 diabetes mellitus with hyperglycemia (HCC)  This is now more out of control than before with hemoglobin A1c increased from 7.5-8.7.  I told the patient I need to add a third agent as she is already on maximum doses of Metformin and Jardiance.  The Jardiance is already an expensive medication with a high co-pay for her.  I will add glipizide SR 5 mg 1 tablet daily to be taken with her breakfast.  Continue Metformin and Jardiance.  Advised work hard on avoidance of sweets, decreasing the carbs.  She was given a diet sheet to follow.  Advised to exercise and lose weight.  I will reevaluate in 3 months.  - glipiZIDE SR (GLUCOTROL) 5 MG TABLET SR 24 HR; Take 1 tablet by mouth every day.  Dispense: 90 tablet; Refill: 1  - Lipid Profile; Future  - Comp Metabolic Panel; Future  - HEMOGLOBIN A1C; Future  - CBC WITH DIFFERENTIAL; Future  - TSH; Future  - metformin (GLUCOPHAGE) 1000 MG tablet; Take 1 tablet by mouth 2 times a day with meals.  Dispense: 180 tablet; Refill: 1    2. Essential hypertension  Slight elevation of the systolic blood pressure.  Our goal is for the systolic blood pressure to be below 140.  Advised work hard on exercise, watching the salt intake and weight loss.  Consider higher dose of metoprolol if not improved next visit.  - Lipid Profile; Future  - Comp Metabolic Panel; Future  - HEMOGLOBIN A1C; Future  - CBC WITH DIFFERENTIAL; Future  - TSH; Future  - losartan (COZAAR) 100 MG Tab; Take 1 tablet by mouth once daily  Dispense: 90 tablet; Refill: 1    3. Dyslipidemia  All at target except for mild  elevation of triglycerides consistent with uncontrolled diabetes.  Advised to work hard on avoidance of sweets, decreasing the carbs, regular exercises and weight loss.  Continue cholesterol medication and recheck next visit.  - Lipid Profile; Future  - Comp Metabolic Panel; Future  - HEMOGLOBIN A1C; Future  - CBC WITH DIFFERENTIAL; Future  - TSH; Future    4. Hypothyroidism, unspecified type  The last TSH was in August 2020.  We will do updated TSH next visit.  - Lipid Profile; Future  - Comp Metabolic Panel; Future  - HEMOGLOBIN A1C; Future  - CBC WITH DIFFERENTIAL; Future  - TSH; Future    Follow-up in 3 months.      Please note that this dictation was created using voice recognition software. I have worked with consultants from the vendor as well as technical experts from [facility]  [employer] to optimize the interface. I have made every reasonable attempt to correct obvious errors, but I expect that there are errors of grammar and possibly content I did not discover before finalizing the note.

## 2021-05-24 ENCOUNTER — TELEMEDICINE (OUTPATIENT)
Dept: MEDICAL GROUP | Facility: PHYSICIAN GROUP | Age: 71
End: 2021-05-24
Payer: MEDICARE

## 2021-05-24 VITALS
HEIGHT: 61 IN | BODY MASS INDEX: 28.51 KG/M2 | WEIGHT: 151 LBS | DIASTOLIC BLOOD PRESSURE: 78 MMHG | SYSTOLIC BLOOD PRESSURE: 129 MMHG | TEMPERATURE: 99.1 F | HEART RATE: 72 BPM

## 2021-05-24 DIAGNOSIS — J20.9 ACUTE BRONCHITIS, UNSPECIFIED ORGANISM: ICD-10-CM

## 2021-05-24 PROCEDURE — 99213 OFFICE O/P EST LOW 20 MIN: CPT | Mod: 95 | Performed by: FAMILY MEDICINE

## 2021-05-24 RX ORDER — AZITHROMYCIN 250 MG/1
TABLET, FILM COATED ORAL
Qty: 6 TABLET | Refills: 0 | Status: SHIPPED | OUTPATIENT
Start: 2021-05-24 | End: 2021-11-18

## 2021-05-24 ASSESSMENT — FIBROSIS 4 INDEX: FIB4 SCORE: 1.16

## 2021-05-25 NOTE — PROGRESS NOTES
Virtual Visit: Established Patient   This visit was conducted via Zoom using secure and encrypted videoconferencing technology. The patient was in a private location in the state of [place].    The patient's identity was confirmed and verbal consent was obtained for this virtual visit.    Subjective:   CC:   Chief Complaint   Patient presents with   • Cough       Bernadette Freitas is a 70 y.o. female presenting for evaluation and management of:    Patient complains of 10 days duration of cough with yellowish phlegm.  Denies any fever, chills, shortness of breath.  She also has nasal congestion.  She has yellowish nasal discharge alternating with watery clear discharge.  She said she and her family moved to a new house 3 weeks ago which may have caused her immune system to go down.  She has been fully vaccinated with Covid 19 vaccine with the last dose on 4/10/2021.  She has been taking Robitussin-DM for the cough and cold with some help.     Patient with multiple medical problems including hypertension, hypothyroidism, dyslipidemia, diabetes mellitus type 2 and age is 70.    ROS   Denies any recent fevers or chills. No nausea or vomiting. No chest pains or shortness of breath.     No Known Allergies    Current medicines (including changes today)  Current Outpatient Medications   Medication Sig Dispense Refill   • azithromycin (ZITHROMAX) 250 MG Tab Take 2 tabs by mouth today then 1 tab daily for 4 days. 6 tablet 0   • glipiZIDE SR (GLUCOTROL) 5 MG TABLET SR 24 HR Take 1 tablet by mouth every day. 90 tablet 1   • metformin (GLUCOPHAGE) 1000 MG tablet Take 1 tablet by mouth 2 times a day with meals. 180 tablet 1   • losartan (COZAAR) 100 MG Tab Take 1 tablet by mouth once daily 90 tablet 1   • pantoprazole (PROTONIX) 40 MG Tablet Delayed Response Take 1 tablet by mouth once daily 90 Tab 1   • metoprolol SR (TOPROL XL) 50 MG TABLET SR 24 HR Take 1 tablet by mouth once daily 90 Tab 1   • JARDIANCE 25 MG Tab Take 1  tablet by mouth once daily 90 Tab 1   • levothyroxine (SYNTHROID) 75 MCG Tab TAKE 1 TABLET BY MOUTH IN THE MORNING ON AN EMPTY STOMACH 90 Tab 1   • escitalopram (LEXAPRO) 10 MG Tab Take 1 tablet by mouth once daily 90 Tab 1   • atorvastatin (LIPITOR) 40 MG Tab TAKE 1 TABLET BY MOUTH ONCE DAILY IN THE EVENING 90 Tab 1   • ondansetron (ZOFRAN ODT) 4 MG TABLET DISPERSIBLE Take 1 Tab by mouth every 8 hours as needed. 10 Tab 1   • amoxicillin-clavulanate (AUGMENTIN) 875-125 MG Tab Take 1 Tab by mouth 2 times a day. (Patient not taking: Reported on 4/12/2021) 20 Tab 0   • fluticasone (FLONASE) 50 MCG/ACT nasal spray SHAKE LIQUID AND USE 2 SPRAYS IN EACH NOSTRIL ONCE DAILY (Patient not taking: Reported on 4/12/2021) 16 g 2   • ONETOUCH ULTRA strip USE  STRIP TO CHECK GLUCOSE ONCE DAILY BEFORE  BREAKFAST  AND  AS  NEEDED  FOR  SYMPTOMS  OF  HIGH  OR  LOW  BLOOD  SUGARS 150 Strip 1   • ibuprofen (MOTRIN) 800 MG Tab Take 1 Tab by mouth every 8 hours as needed for Moderate Pain. (Patient not taking: Reported on 1/4/2021) 60 Tab 1   • glucose blood (ONE TOUCH ULTRA TEST) strip USE ONCE DAILY BEFORE BREAKFAST AND AS NEEDED FOR SYMPTOMS OF HIGH OR LOW BLOOS SUGAR 100 Strip 5   • Lancets Lancets for glucometer. Sig: use twice daily. 100 Each 5   • ONETOUCH DELICA LANCETS 33G Misc USE ONCE DAILY BEFORE BREAKFAST AND AS NEEDED FOR SYMPTOMS OF HIGH OR LOW BLOOD SUGAR 100 Each 5   • FREESTYLE LITE strip TEST BEFORE BREAKFAST AND AS NEEDED FOR SIGNS AND SYMPTOMS OF HIGH OR LOW SUGAR 50 Strip 5   • Blood Glucose Monitoring Suppl Device Meter: Dispense Device of Insurance Preference. Sig. Use as directed for blood sugar monitoring. #1. NR. 1 Device 0   • FREESTYLE LANCETS Misc TEST ONCE DAILY AND AS NEEDED FOR SIGNS AND SYMPTOMS OF HIGH OR LOW SUGAR. 100 Each 5   • TRUEPLUS LANCETS 28G Misc USE ONCE DAILY AND AS NEEDED FOR SYMPTOMS OF HIGH OR LOW SUGAR 200 Each 3   • Blood Glucose Monitoring Suppl SUPPLIES Misc Test strips order: Test  "strips for glucometer. Sig: use before breakfast and prn ssx high or low sugar #50 RF x 5 50 Strip 5   • Lancets MISC Lancets order: Lancets for glucometer. Sig: use before breakfast and prn ssx high or low sugar. #50 RF x 5 50 Each 5     No current facility-administered medications for this visit.       Patient Active Problem List    Diagnosis Date Noted   • Uncontrolled type 2 diabetes mellitus, without long-term current use of insulin ([facility]) 01/23/2018   • Obesity (BMI 30-39.9) 10/17/2017   • Diabetes mellitus without complication ([facility]) 11/28/2016   • Essential hypertension 08/22/2016   • GERD (gastroesophageal reflux disease) 08/17/2010   • HTN (hypertension)    • Hypothyroidism    • Dyslipidemia    • Depression        Family History   Problem Relation Age of Onset   • Cancer Mother         esophagus   • Genitourinary () Problems Sister         renal failure on dialysis   • Hyperlipidemia Sister    • Hyperlipidemia Sister        She  has a past medical history of Depression, Dyslipidemia, HTN (hypertension), Hypercholesteremia, Hypothyroidism, and Impaired fasting glucose. She also has no past medical history of ASTHMA, CAD (coronary artery disease), Cancer (HCC), Congestive heart failure (HCC), COPD, Diabetes, Infectious disease, Liver disease, Renal disorder, Seizure disorder (HCC), or Stroke ([facility]).  She  has a past surgical history that includes primary c section; thyroidectomy total; colonoscopy (2008); gastroscopy (2008); breast biopsy (3/13); colonoscopy with polyp (11/19/2013); colonoscopy with polyp (10/30/2017); and cataract extraction with iol (Bilateral, 03/03/2020, 5/4/2020).       Objective:   /78   Pulse 72   Temp 37.3 °C (99.1 °F)   Ht 1.549 m (5' 1\")   Wt 68.5 kg (151 lb)   LMP 01/20/2008   BMI 28.53 kg/m²     Physical Exam:  Constitutional: Alert, no distress, well-groomed.  Skin: No rashes in visible areas.  Eye: Round. Conjunctiva clear, lids normal. No icterus.   ENMT: Lips pink " without lesions, good dentition, moist mucous membranes. Phonation normal.  Neck: No masses, no thyromegaly. Moves freely without pain.  Respiratory: Unlabored respiratory effort, no cough or audible wheeze  Psych: Alert and oriented x3, normal affect and mood.       Assessment and Plan:   The following treatment plan was discussed:     1. Acute bronchitis, unspecified organism  - azithromycin (ZITHROMAX) 250 MG Tab; Take 2 tabs by mouth today then 1 tab daily for 4 days.  Dispense: 6 tablet; Refill: 0    We will treat for acute bronchitis especially because of her comorbidities.  Prescription sent to pharmacy for Zithromax Z-Pieter.  Advise increase p.o. fluids and rest.  May continue with Robitussin-DM.  She will let me know if there is no improvement or worsening of the problem after she is done with antibiotics.    Follow-up: Keep appointment as scheduled or return sooner if needed.      Please note that this dictation was created using voice recognition software. I have worked with consultants from the vendor as well as technical experts from [facility] to optimize the interface. I have made every reasonable attempt to correct obvious errors, but I expect that there are errors of grammar and possibly content I did not discover before finalizing the note.

## 2021-07-17 ENCOUNTER — HOSPITAL ENCOUNTER (OUTPATIENT)
Dept: LAB | Facility: MEDICAL CENTER | Age: 71
End: 2021-07-17
Attending: FAMILY MEDICINE
Payer: MEDICARE

## 2021-07-17 DIAGNOSIS — E11.65 UNCONTROLLED TYPE 2 DIABETES MELLITUS WITH HYPERGLYCEMIA (HCC): ICD-10-CM

## 2021-07-17 DIAGNOSIS — E03.9 HYPOTHYROIDISM, UNSPECIFIED TYPE: ICD-10-CM

## 2021-07-17 DIAGNOSIS — I10 ESSENTIAL HYPERTENSION: ICD-10-CM

## 2021-07-17 DIAGNOSIS — E78.5 DYSLIPIDEMIA: ICD-10-CM

## 2021-07-17 LAB
ALBUMIN SERPL BCP-MCNC: 4.4 G/DL (ref 3.2–4.9)
ALBUMIN/GLOB SERPL: 1.8 G/DL
ALP SERPL-CCNC: 43 U/L (ref 30–99)
ALT SERPL-CCNC: 40 U/L (ref 2–50)
ANION GAP SERPL CALC-SCNC: 13 MMOL/L (ref 7–16)
AST SERPL-CCNC: 38 U/L (ref 12–45)
BASOPHILS # BLD AUTO: 0.7 % (ref 0–1.8)
BASOPHILS # BLD: 0.04 K/UL (ref 0–0.12)
BILIRUB SERPL-MCNC: 0.4 MG/DL (ref 0.1–1.5)
BUN SERPL-MCNC: 12 MG/DL (ref 8–22)
CALCIUM SERPL-MCNC: 9.3 MG/DL (ref 8.5–10.5)
CHLORIDE SERPL-SCNC: 103 MMOL/L (ref 96–112)
CHOLEST SERPL-MCNC: 153 MG/DL (ref 100–199)
CO2 SERPL-SCNC: 25 MMOL/L (ref 20–33)
CREAT SERPL-MCNC: 0.83 MG/DL (ref 0.5–1.4)
EOSINOPHIL # BLD AUTO: 0.07 K/UL (ref 0–0.51)
EOSINOPHIL NFR BLD: 1.3 % (ref 0–6.9)
ERYTHROCYTE [DISTWIDTH] IN BLOOD BY AUTOMATED COUNT: 46.1 FL (ref 35.9–50)
EST. AVERAGE GLUCOSE BLD GHB EST-MCNC: 154 MG/DL
FASTING STATUS PATIENT QL REPORTED: NORMAL
GLOBULIN SER CALC-MCNC: 2.5 G/DL (ref 1.9–3.5)
GLUCOSE SERPL-MCNC: 122 MG/DL (ref 65–99)
HBA1C MFR BLD: 7 % (ref 4–5.6)
HCT VFR BLD AUTO: 44.2 % (ref 37–47)
HDLC SERPL-MCNC: 63 MG/DL
HGB BLD-MCNC: 14.3 G/DL (ref 12–16)
IMM GRANULOCYTES # BLD AUTO: 0.01 K/UL (ref 0–0.11)
IMM GRANULOCYTES NFR BLD AUTO: 0.2 % (ref 0–0.9)
LDLC SERPL CALC-MCNC: 59 MG/DL
LYMPHOCYTES # BLD AUTO: 2.2 K/UL (ref 1–4.8)
LYMPHOCYTES NFR BLD: 39.7 % (ref 22–41)
MCH RBC QN AUTO: 30.5 PG (ref 27–33)
MCHC RBC AUTO-ENTMCNC: 32.4 G/DL (ref 33.6–35)
MCV RBC AUTO: 94.2 FL (ref 81.4–97.8)
MONOCYTES # BLD AUTO: 0.43 K/UL (ref 0–0.85)
MONOCYTES NFR BLD AUTO: 7.8 % (ref 0–13.4)
NEUTROPHILS # BLD AUTO: 2.79 K/UL (ref 2–7.15)
NEUTROPHILS NFR BLD: 50.3 % (ref 44–72)
NRBC # BLD AUTO: 0 K/UL
NRBC BLD-RTO: 0 /100 WBC
PLATELET # BLD AUTO: 260 K/UL (ref 164–446)
PMV BLD AUTO: 11.1 FL (ref 9–12.9)
POTASSIUM SERPL-SCNC: 4.4 MMOL/L (ref 3.6–5.5)
PROT SERPL-MCNC: 6.9 G/DL (ref 6–8.2)
RBC # BLD AUTO: 4.69 M/UL (ref 4.2–5.4)
SODIUM SERPL-SCNC: 141 MMOL/L (ref 135–145)
TRIGL SERPL-MCNC: 155 MG/DL (ref 0–149)
TSH SERPL DL<=0.005 MIU/L-ACNC: 0.64 UIU/ML (ref 0.38–5.33)
WBC # BLD AUTO: 5.5 K/UL (ref 4.8–10.8)

## 2021-07-17 PROCEDURE — 83036 HEMOGLOBIN GLYCOSYLATED A1C: CPT | Mod: GA

## 2021-07-17 PROCEDURE — 85025 COMPLETE CBC W/AUTO DIFF WBC: CPT

## 2021-07-17 PROCEDURE — 80061 LIPID PANEL: CPT

## 2021-07-17 PROCEDURE — 80053 COMPREHEN METABOLIC PANEL: CPT

## 2021-07-17 PROCEDURE — 84443 ASSAY THYROID STIM HORMONE: CPT

## 2021-07-17 PROCEDURE — 36415 COLL VENOUS BLD VENIPUNCTURE: CPT

## 2021-07-19 ENCOUNTER — OFFICE VISIT (OUTPATIENT)
Dept: MEDICAL GROUP | Facility: PHYSICIAN GROUP | Age: 71
End: 2021-07-19
Payer: MEDICARE

## 2021-07-19 VITALS
HEIGHT: 61 IN | OXYGEN SATURATION: 95 % | HEART RATE: 59 BPM | SYSTOLIC BLOOD PRESSURE: 120 MMHG | BODY MASS INDEX: 28.89 KG/M2 | WEIGHT: 153 LBS | TEMPERATURE: 97.8 F | DIASTOLIC BLOOD PRESSURE: 70 MMHG

## 2021-07-19 DIAGNOSIS — E11.9 DIABETES MELLITUS WITHOUT COMPLICATION (HCC): ICD-10-CM

## 2021-07-19 DIAGNOSIS — I10 ESSENTIAL HYPERTENSION: ICD-10-CM

## 2021-07-19 DIAGNOSIS — E03.9 HYPOTHYROIDISM, UNSPECIFIED TYPE: ICD-10-CM

## 2021-07-19 DIAGNOSIS — F33.9 RECURRENT MAJOR DEPRESSIVE DISORDER, REMISSION STATUS UNSPECIFIED (HCC): ICD-10-CM

## 2021-07-19 DIAGNOSIS — E78.5 DYSLIPIDEMIA: ICD-10-CM

## 2021-07-19 PROCEDURE — 99214 OFFICE O/P EST MOD 30 MIN: CPT | Performed by: FAMILY MEDICINE

## 2021-07-19 RX ORDER — FLUTICASONE PROPIONATE 50 MCG
SPRAY, SUSPENSION (ML) NASAL
Qty: 16 G | Refills: 2 | Status: SHIPPED | OUTPATIENT
Start: 2021-07-19 | End: 2023-05-02 | Stop reason: SDUPTHER

## 2021-07-19 ASSESSMENT — FIBROSIS 4 INDEX: FIB4 SCORE: 1.59

## 2021-07-19 NOTE — PROGRESS NOTES
"Subjective:      Bernadette Freitas is a 70 y.o. female who presents with Diabetes            HPI     Patient is here for follow-up of her medical problems.    4 months ago, her hemoglobin A1c increased from 7.5-8.7.  I added glipizide XL 5 mg 1 tablet daily to her Metformin and Jardiance because she was already on maximum doses of these medications.  She said when she was taking a full tablet of glipizide she was feeling shaky especially later in the day.  So we decrease the dose to half a tablet daily only.  She said she has been watching the diet more closely.  She is not engaging in regular exercises.  Her weight is down only a pound since 4 months ago.    For her hypertension, this is under control on metoprolol XL 50 mg daily and losartan 100 mg daily without side effects.    She continues to take atorvastatin 40 mg nightly for dyslipidemia without myalgias.    For her hypothyroidism, she continues to take thyroid replacement regularly.    She continues to take Lexapro 10 mg 1 tablet daily for depression with good results.  There are days when she only takes 1 every other day.  She wants to continue taking the medication.    Past medical history, past surgical history, family history reviewed-no changes    She needs the shingles vaccine.    Social history reviewed-no changes    Allergies reviewed-no changes    Medications reviewed-no changes    ROS     As per HPI, the rest are negative.       Objective:     /70 (BP Location: Left arm, Patient Position: Sitting, BP Cuff Size: Adult)   Pulse (!) 59   Temp 36.6 °C (97.8 °F) (Temporal)   Ht 1.549 m (5' 1\")   Wt 69.4 kg (153 lb)   LMP 01/20/2008   SpO2 95%   BMI 28.91 kg/m²      Physical Exam         Examined alert, awake, oriented, not in distress    Neck-supple, no lymphadenopathy, no thyromegaly  Lungs-clear to auscultation, no rales, no wheezes  Heart-regular rate and rhythm, no murmur  Extremities-no edema, clubbing, cyanosis    Hospital " Outpatient Visit on 07/17/2021   Component Date Value Ref Range Status   • TSH 07/17/2021 0.640  0.380 - 5.330 uIU/mL Final    Comment: Please note new reference ranges effective 12/14/2017 10:00 AM    Pregnant Females, 1st Trimester  0.050-3.700  Pregnant Females, 2nd Trimester  0.310-4.350  Pregnant Females, 3rd Trimester  0.410-5.180     • WBC 07/17/2021 5.5  4.8 - 10.8 K/uL Final   • RBC 07/17/2021 4.69  4.20 - 5.40 M/uL Final   • Hemoglobin 07/17/2021 14.3  12.0 - 16.0 g/dL Final   • Hematocrit 07/17/2021 44.2  37.0 - 47.0 % Final   • MCV 07/17/2021 94.2  81.4 - 97.8 fL Final   • MCH 07/17/2021 30.5  27.0 - 33.0 pg Final   • MCHC 07/17/2021 32.4* 33.6 - 35.0 g/dL Final   • RDW 07/17/2021 46.1  35.9 - 50.0 fL Final   • Platelet Count 07/17/2021 260  164 - 446 K/uL Final   • MPV 07/17/2021 11.1  9.0 - 12.9 fL Final   • Neutrophils-Polys 07/17/2021 50.30  44.00 - 72.00 % Final   • Lymphocytes 07/17/2021 39.70  22.00 - 41.00 % Final   • Monocytes 07/17/2021 7.80  0.00 - 13.40 % Final   • Eosinophils 07/17/2021 1.30  0.00 - 6.90 % Final   • Basophils 07/17/2021 0.70  0.00 - 1.80 % Final   • Immature Granulocytes 07/17/2021 0.20  0.00 - 0.90 % Final   • Nucleated RBC 07/17/2021 0.00  /100 WBC Final   • Neutrophils (Absolute) 07/17/2021 2.79  2.00 - 7.15 K/uL Final    Includes immature neutrophils, if present.   • Lymphs (Absolute) 07/17/2021 2.20  1.00 - 4.80 K/uL Final   • Monos (Absolute) 07/17/2021 0.43  0.00 - 0.85 K/uL Final   • Eos (Absolute) 07/17/2021 0.07  0.00 - 0.51 K/uL Final   • Baso (Absolute) 07/17/2021 0.04  0.00 - 0.12 K/uL Final   • Immature Granulocytes (abs) 07/17/2021 0.01  0.00 - 0.11 K/uL Final   • NRBC (Absolute) 07/17/2021 0.00  K/uL Final   • Glycohemoglobin 07/17/2021 7.0* 4.0 - 5.6 % Final    Comment: Increased risk for diabetes:  5.7 -6.4%  Diabetes:  >6.4%  Glycemic control for adults with diabetes:  <7.0%    The above interpretations are per ADA guidelines.  Diagnosis  of diabetes  mellitus on the basis of elevated Hemoglobin A1c  should be confirmed by repeating the Hb A1c test.     • Est Avg Glucose 07/17/2021 154  mg/dL Final    Comment: The eAG calculation is based on the A1c-Derived Daily Glucose  (ADAG) study.  See the ADA's website for additional information.     • Sodium 07/17/2021 141  135 - 145 mmol/L Final   • Potassium 07/17/2021 4.4  3.6 - 5.5 mmol/L Final   • Chloride 07/17/2021 103  96 - 112 mmol/L Final   • Co2 07/17/2021 25  20 - 33 mmol/L Final   • Anion Gap 07/17/2021 13.0  7.0 - 16.0 Final   • Glucose 07/17/2021 122* 65 - 99 mg/dL Final   • Bun 07/17/2021 12  8 - 22 mg/dL Final   • Creatinine 07/17/2021 0.83  0.50 - 1.40 mg/dL Final   • Calcium 07/17/2021 9.3  8.5 - 10.5 mg/dL Final   • AST(SGOT) 07/17/2021 38  12 - 45 U/L Final   • ALT(SGPT) 07/17/2021 40  2 - 50 U/L Final   • Alkaline Phosphatase 07/17/2021 43  30 - 99 U/L Final   • Total Bilirubin 07/17/2021 0.4  0.1 - 1.5 mg/dL Final   • Albumin 07/17/2021 4.4  3.2 - 4.9 g/dL Final   • Total Protein 07/17/2021 6.9  6.0 - 8.2 g/dL Final   • Globulin 07/17/2021 2.5  1.9 - 3.5 g/dL Final   • A-G Ratio 07/17/2021 1.8  g/dL Final   • Cholesterol,Tot 07/17/2021 153  100 - 199 mg/dL Final   • Triglycerides 07/17/2021 155* 0 - 149 mg/dL Final   • HDL 07/17/2021 63  >=40 mg/dL Final   • LDL 07/17/2021 59  <100 mg/dL Final   • Fasting Status 07/17/2021 Fasting   Final   • GFR If  07/17/2021 >60  >60 mL/min/1.73 m 2 Final   • GFR If Non  07/17/2021 >60  >60 mL/min/1.73 m 2 Final              Assessment/Plan:        1. Diabetes mellitus without complication (HCC)  There is significant improvement of the hemoglobin A1c and this is down from 8.7-7.0.  This is now at goal.  Continue current medications with current doses.  Advised to continue watching the diet, try to exercise regularly and lose weight.  I will reevaluate in 5 months including blood work at that time.  - Lipid Profile; Future  - Comp  Metabolic Panel; Future  - HEMOGLOBIN A1C; Future  - MICROALBUMIN CREAT RATIO URINE; Future  - TSH; Future    2. Essential hypertension  Controlled on her medications.  Continue the same meds and the same doses.  - Lipid Profile; Future  - Comp Metabolic Panel; Future  - HEMOGLOBIN A1C; Future  - MICROALBUMIN CREAT RATIO URINE; Future  - TSH; Future    3. Dyslipidemia  The triglycerides are about the same.  The rest are at goal.  Continue atorvastatin.  Watch the diet more closely in terms of avoidance of sweets and decreasing the carbs, regular exercises and weight loss.  - Lipid Profile; Future  - Comp Metabolic Panel; Future  - HEMOGLOBIN A1C; Future  - MICROALBUMIN CREAT RATIO URINE; Future  - TSH; Future    4. Hypothyroidism, unspecified type  Adequately replaced.  Continue the same dose of levothyroxine.  - Lipid Profile; Future  - Comp Metabolic Panel; Future  - HEMOGLOBIN A1C; Future  - MICROALBUMIN CREAT RATIO URINE; Future  - TSH; Future    5. Recurrent major depressive disorder, remission status unspecified (HCC)  Stable on Lexapro.  Continue medication.  - Lipid Profile; Future  - Comp Metabolic Panel; Future  - HEMOGLOBIN A1C; Future  - MICROALBUMIN CREAT RATIO URINE; Future  - TSH; Future      Follow-up in 5 months or sooner if needed.      Please note that this dictation was created using voice recognition software. I have worked with consultants from the vendor as well as technical experts from [employer] to optimize the interface. I have made every reasonable attempt to correct obvious errors, but I expect that there are errors of grammar and possibly content I did not discover before finalizing the note.

## 2021-07-29 DIAGNOSIS — K21.9 GASTROESOPHAGEAL REFLUX DISEASE: ICD-10-CM

## 2021-07-29 DIAGNOSIS — F33.9 RECURRENT MAJOR DEPRESSIVE DISORDER, REMISSION STATUS UNSPECIFIED (HCC): ICD-10-CM

## 2021-07-29 DIAGNOSIS — E03.9 HYPOTHYROIDISM, UNSPECIFIED TYPE: ICD-10-CM

## 2021-07-29 DIAGNOSIS — E11.65 UNCONTROLLED TYPE 2 DIABETES MELLITUS WITH HYPERGLYCEMIA (HCC): ICD-10-CM

## 2021-07-29 DIAGNOSIS — I10 ESSENTIAL HYPERTENSION: ICD-10-CM

## 2021-07-29 DIAGNOSIS — E78.5 DYSLIPIDEMIA: ICD-10-CM

## 2021-07-30 RX ORDER — ESCITALOPRAM OXALATE 10 MG/1
TABLET ORAL
Qty: 90 TABLET | Refills: 1 | Status: SHIPPED | OUTPATIENT
Start: 2021-07-30 | End: 2022-01-03

## 2021-07-30 RX ORDER — LEVOTHYROXINE SODIUM 0.07 MG/1
TABLET ORAL
Qty: 90 TABLET | Refills: 1 | Status: SHIPPED | OUTPATIENT
Start: 2021-07-30 | End: 2022-01-03

## 2021-07-30 RX ORDER — METOPROLOL SUCCINATE 50 MG/1
TABLET, EXTENDED RELEASE ORAL
Qty: 90 TABLET | Refills: 1 | Status: SHIPPED | OUTPATIENT
Start: 2021-07-30 | End: 2022-01-03

## 2021-07-30 RX ORDER — ATORVASTATIN CALCIUM 40 MG/1
TABLET, FILM COATED ORAL
Qty: 90 TABLET | Refills: 1 | Status: SHIPPED | OUTPATIENT
Start: 2021-07-30 | End: 2022-01-03

## 2021-07-30 RX ORDER — PANTOPRAZOLE SODIUM 40 MG/1
TABLET, DELAYED RELEASE ORAL
Qty: 90 TABLET | Refills: 1 | Status: SHIPPED | OUTPATIENT
Start: 2021-07-30 | End: 2022-01-03

## 2021-07-30 RX ORDER — EMPAGLIFLOZIN 25 MG/1
TABLET, FILM COATED ORAL
Qty: 90 TABLET | Refills: 1 | Status: SHIPPED | OUTPATIENT
Start: 2021-07-30 | End: 2022-01-03

## 2021-10-11 DIAGNOSIS — E11.65 UNCONTROLLED TYPE 2 DIABETES MELLITUS WITH HYPERGLYCEMIA (HCC): ICD-10-CM

## 2021-11-18 ENCOUNTER — OFFICE VISIT (OUTPATIENT)
Dept: URGENT CARE | Facility: PHYSICIAN GROUP | Age: 71
End: 2021-11-18
Payer: MEDICARE

## 2021-11-18 ENCOUNTER — HOSPITAL ENCOUNTER (OUTPATIENT)
Facility: MEDICAL CENTER | Age: 71
End: 2021-11-18
Attending: STUDENT IN AN ORGANIZED HEALTH CARE EDUCATION/TRAINING PROGRAM
Payer: MEDICARE

## 2021-11-18 VITALS
RESPIRATION RATE: 16 BRPM | BODY MASS INDEX: 28.89 KG/M2 | SYSTOLIC BLOOD PRESSURE: 108 MMHG | WEIGHT: 153 LBS | TEMPERATURE: 98.8 F | HEART RATE: 84 BPM | DIASTOLIC BLOOD PRESSURE: 72 MMHG | OXYGEN SATURATION: 97 % | HEIGHT: 61 IN

## 2021-11-18 DIAGNOSIS — Z20.822 COVID-19 RULED OUT: ICD-10-CM

## 2021-11-18 DIAGNOSIS — B02.9 HERPES ZOSTER WITHOUT COMPLICATION: ICD-10-CM

## 2021-11-18 DIAGNOSIS — J01.40 ACUTE NON-RECURRENT PANSINUSITIS: ICD-10-CM

## 2021-11-18 PROCEDURE — U0003 INFECTIOUS AGENT DETECTION BY NUCLEIC ACID (DNA OR RNA); SEVERE ACUTE RESPIRATORY SYNDROME CORONAVIRUS 2 (SARS-COV-2) (CORONAVIRUS DISEASE [COVID-19]), AMPLIFIED PROBE TECHNIQUE, MAKING USE OF HIGH THROUGHPUT TECHNOLOGIES AS DESCRIBED BY CMS-2020-01-R: HCPCS

## 2021-11-18 PROCEDURE — U0005 INFEC AGEN DETEC AMPLI PROBE: HCPCS

## 2021-11-18 PROCEDURE — 99214 OFFICE O/P EST MOD 30 MIN: CPT | Mod: CS | Performed by: STUDENT IN AN ORGANIZED HEALTH CARE EDUCATION/TRAINING PROGRAM

## 2021-11-18 RX ORDER — VALACYCLOVIR HYDROCHLORIDE 1 G/1
1000 TABLET, FILM COATED ORAL 3 TIMES DAILY
Qty: 21 TABLET | Refills: 0 | Status: SHIPPED | OUTPATIENT
Start: 2021-11-18 | End: 2021-11-25

## 2021-11-18 RX ORDER — AMOXICILLIN AND CLAVULANATE POTASSIUM 875; 125 MG/1; MG/1
1 TABLET, FILM COATED ORAL 2 TIMES DAILY
Qty: 10 TABLET | Refills: 0 | Status: SHIPPED | OUTPATIENT
Start: 2021-11-21 | End: 2021-11-26

## 2021-11-18 ASSESSMENT — FIBROSIS 4 INDEX: FIB4 SCORE: 1.62

## 2021-11-18 NOTE — PROGRESS NOTES
Subjective:   CHIEF COMPLAINT  Chief Complaint   Patient presents with   • Cough     Cough and congestion ongoing 1 week.     • Rash     Itchy rash on left shoulder x 3 days.       HPI  Bernadette Freitas is a 71 y.o. female who presents with chief complaint of nasal congestion, runny nose, scratchy throat and productive cough.  Says she is producing yellow/green sputum first thing in the morning but not during the day.  Says overall her symptoms have slightly improved.  She is not wheezing or feeling short of breath.  No past medical history of pneumonia or RAD.  Patient is vaccinated against Covid.    Additionally the patient says she is developed a red rash along the left side of her neck.  Says the rash was initially pruritic which is most resolved.  Says she is not experiencing any pain.    REVIEW OF SYSTEMS  General: no fever or chills  GI: no nausea or vomiting  See HPI for further details.     PAST MEDICAL HISTORY  Patient Active Problem List    Diagnosis Date Noted   • Uncontrolled type 2 diabetes mellitus, without long-term current use of insulin ([facility]) 01/23/2018   • Obesity (BMI 30-39.9) 10/17/2017   • Diabetes mellitus without complication ([facility]) 11/28/2016   • Essential hypertension 08/22/2016   • GERD (gastroesophageal reflux disease) 08/17/2010   • HTN (hypertension)    • Hypothyroidism    • Dyslipidemia    • Depression        SURGICAL HISTORY   has a past surgical history that includes primary c section; thyroidectomy total; colonoscopy (2008); gastroscopy (2008); breast biopsy (3/13); colonoscopy with polyp (11/19/2013); colonoscopy with polyp (10/30/2017); and cataract extraction with iol (Bilateral, 03/03/2020, 5/4/2020).    ALLERGIES  No Known Allergies    CURRENT MEDICATIONS  Home Medications     Reviewed by Milan Wei D.O. (Physician) on 11/18/21 at 1311  Med List Status: <None>   Medication Last Dose Status   amoxicillin-clavulanate (AUGMENTIN) 875-125 MG Tab Not Taking Active  "  atorvastatin (LIPITOR) 40 MG Tab  Active   azithromycin (ZITHROMAX) 250 MG Tab Not Taking Active   Blood Glucose Monitoring Suppl Device  Active   Blood Glucose Monitoring Suppl SUPPLIES Misc  Active   escitalopram (LEXAPRO) 10 MG Tab  Active   fluticasone (FLONASE) 50 MCG/ACT nasal spray  Active   FREESTYLE LANCETS Misc  Active   FREESTYLE LITE strip  Active   glipiZIDE SR (GLUCOTROL) 5 MG TABLET SR 24 HR  Active   glucose blood (ONE TOUCH ULTRA TEST) strip  Active   ibuprofen (MOTRIN) 800 MG Tab  Active   JARDIANCE 25 MG Tab  Active   Lancets  Active   Lancets MISC  Active   levothyroxine (SYNTHROID) 75 MCG Tab  Active   losartan (COZAAR) 100 MG Tab  Active   metformin (GLUCOPHAGE) 1000 MG tablet  Active   metoprolol SR (TOPROL XL) 50 MG TABLET SR 24 HR  Active   ondansetron (ZOFRAN ODT) 4 MG TABLET DISPERSIBLE  Active   ONETOUCH DELICA LANCETS 33G Misc  Active   ONETOUCH ULTRA strip  Active   pantoprazole (PROTONIX) 40 MG Tablet Delayed Response  Active   TRUEPLUS LANCETS 28G Misc  Active                SOCIAL HISTORY  Social History     Tobacco Use   • Smoking status: Former Smoker     Packs/day: 0.00     Quit date: 1998     Years since quittin.0   • Smokeless tobacco: Never Used   • Tobacco comment: quit , smoked 1-2 cig/day for 5 yrs   Vaping Use   • Vaping Use: Never used   Substance and Sexual Activity   • Alcohol use: Yes     Alcohol/week: 0.0 oz     Comment: rare   • Drug use: No   • Sexual activity: Yes     Partners: Male       FAMILY HISTORY  Family History   Problem Relation Age of Onset   • Cancer Mother         esophagus   • Genitourinary () Problems Sister         renal failure on dialysis   • Hyperlipidemia Sister    • Hyperlipidemia Sister           Objective:   PHYSICAL EXAM  VITAL SIGNS: /72   Pulse 84   Temp 37.1 °C (98.8 °F) (Temporal)   Resp 16   Ht 1.549 m (5' 1\")   Wt 69.4 kg (153 lb)   LMP 2008   SpO2 97%   BMI 28.91 kg/m²     Gen: no acute distress, " normal voice  Skin: grouped vesicles on an erythematous base in a dermatomal distribution at level of left C4  Lungs: CTAB w/ symmetric expansion  CV: RRR w/o murmurs or clicks  Psych: normal affect, normal judgement, alert, awake    Assessment/Plan:     1. Acute non-recurrent pansinusitis  COVID/SARS CoV-2 PCR    amoxicillin-clavulanate (AUGMENTIN) 875-125 MG Tab   2. COVID-19 ruled out  COVID/SARS CoV-2 PCR   3. Herpes zoster without complication  valacyclovir (VALTREX) 1 GM Tab    Lidocaine 4 % Gel   1-2)Signs and symptoms are consistent with a viral upper respiratory tract infection.  Suspect a productive cough secondary to postnasal drip given the patient the sinus congestion.  The patient is afebrile with an oxygen saturation of 97% and her lungs were clear to auscultation bilaterally and highly unlikely sputum is due to pulmonary infection.  Fortunately the symptoms have been improving over the last couple days and believe we can try more conservative measures before starting antibiotics  -Instructed to perform [employer] sinus rinses twice daily  -Ordered Rx for Augmentin postdated for 2 days if failure with above  -Ordered Covid.  Results will be sent through [employer].  -Instructed to quarantine until Covid results have been returned  -Encouraged hydration and symptomatic treatment with Tylenol/ibuprofen prn  -Discussed red flags and return precautions.  Patient verbalized their understanding.  All questions were answered.    3) physical exam consistent with shingles.  Patient is not immunized against shingles.  -Ordered Valtrex  -Ordered topical lidocaine gel  -Instructed to keep it covered until lesion scabbed over to prevent transmission    4) patient with a history of hypertension which appears to be well controlled with a BP of 108/72 today.      Differential diagnosis, natural history, supportive care, and indications for immediate follow-up discussed. Patient agrees with the plan of care.    Follow-up as  needed if symptoms worsen or fail to improve to PCP, Urgent care or Emergency Room.       Please note that this dictation was created using voice recognition software. I have made a reasonable attempt to correct obvious errors, but I expect that there are errors of grammar and possibly content that I did not discover before finalizing the note.

## 2021-11-19 DIAGNOSIS — Z20.822 COVID-19 RULED OUT: ICD-10-CM

## 2021-11-19 DIAGNOSIS — J01.40 ACUTE NON-RECURRENT PANSINUSITIS: ICD-10-CM

## 2021-11-19 LAB
COVID ORDER STATUS COVID19: NORMAL
SARS-COV-2 RNA RESP QL NAA+PROBE: NOTDETECTED
SPECIMEN SOURCE: NORMAL

## 2021-12-18 ENCOUNTER — HOSPITAL ENCOUNTER (OUTPATIENT)
Dept: LAB | Facility: MEDICAL CENTER | Age: 71
End: 2021-12-18
Attending: FAMILY MEDICINE
Payer: MEDICARE

## 2021-12-18 DIAGNOSIS — E11.9 DIABETES MELLITUS WITHOUT COMPLICATION (HCC): ICD-10-CM

## 2021-12-18 DIAGNOSIS — I10 ESSENTIAL HYPERTENSION: ICD-10-CM

## 2021-12-18 DIAGNOSIS — F33.9 RECURRENT MAJOR DEPRESSIVE DISORDER, REMISSION STATUS UNSPECIFIED (HCC): ICD-10-CM

## 2021-12-18 DIAGNOSIS — E03.9 HYPOTHYROIDISM, UNSPECIFIED TYPE: ICD-10-CM

## 2021-12-18 DIAGNOSIS — E78.5 DYSLIPIDEMIA: ICD-10-CM

## 2021-12-18 LAB
ALBUMIN SERPL BCP-MCNC: 4.7 G/DL (ref 3.2–4.9)
ALBUMIN/GLOB SERPL: 1.8 G/DL
ALP SERPL-CCNC: 45 U/L (ref 30–99)
ALT SERPL-CCNC: 34 U/L (ref 2–50)
ANION GAP SERPL CALC-SCNC: 13 MMOL/L (ref 7–16)
AST SERPL-CCNC: 25 U/L (ref 12–45)
BILIRUB SERPL-MCNC: 0.3 MG/DL (ref 0.1–1.5)
BUN SERPL-MCNC: 16 MG/DL (ref 8–22)
CALCIUM SERPL-MCNC: 9.9 MG/DL (ref 8.5–10.5)
CHLORIDE SERPL-SCNC: 104 MMOL/L (ref 96–112)
CHOLEST SERPL-MCNC: 155 MG/DL (ref 100–199)
CO2 SERPL-SCNC: 24 MMOL/L (ref 20–33)
CREAT SERPL-MCNC: 0.73 MG/DL (ref 0.5–1.4)
CREAT UR-MCNC: 37.27 MG/DL
EST. AVERAGE GLUCOSE BLD GHB EST-MCNC: 160 MG/DL
FASTING STATUS PATIENT QL REPORTED: NORMAL
GLOBULIN SER CALC-MCNC: 2.6 G/DL (ref 1.9–3.5)
GLUCOSE SERPL-MCNC: 117 MG/DL (ref 65–99)
HBA1C MFR BLD: 7.2 % (ref 4–5.6)
HDLC SERPL-MCNC: 57 MG/DL
LDLC SERPL CALC-MCNC: 67 MG/DL
MICROALBUMIN UR-MCNC: <1.2 MG/DL
MICROALBUMIN/CREAT UR: NORMAL MG/G (ref 0–30)
POTASSIUM SERPL-SCNC: 4.6 MMOL/L (ref 3.6–5.5)
PROT SERPL-MCNC: 7.3 G/DL (ref 6–8.2)
SODIUM SERPL-SCNC: 141 MMOL/L (ref 135–145)
TRIGL SERPL-MCNC: 153 MG/DL (ref 0–149)
TSH SERPL DL<=0.005 MIU/L-ACNC: 0.98 UIU/ML (ref 0.38–5.33)

## 2021-12-18 PROCEDURE — 80053 COMPREHEN METABOLIC PANEL: CPT

## 2021-12-18 PROCEDURE — 83036 HEMOGLOBIN GLYCOSYLATED A1C: CPT | Mod: GA

## 2021-12-18 PROCEDURE — 36415 COLL VENOUS BLD VENIPUNCTURE: CPT

## 2021-12-18 PROCEDURE — 82570 ASSAY OF URINE CREATININE: CPT

## 2021-12-18 PROCEDURE — 80061 LIPID PANEL: CPT

## 2021-12-18 PROCEDURE — 84443 ASSAY THYROID STIM HORMONE: CPT

## 2021-12-18 PROCEDURE — 82043 UR ALBUMIN QUANTITATIVE: CPT

## 2021-12-20 ENCOUNTER — OFFICE VISIT (OUTPATIENT)
Dept: MEDICAL GROUP | Facility: PHYSICIAN GROUP | Age: 71
End: 2021-12-20
Payer: MEDICARE

## 2021-12-20 VITALS
WEIGHT: 154.54 LBS | BODY MASS INDEX: 29.18 KG/M2 | TEMPERATURE: 97.3 F | DIASTOLIC BLOOD PRESSURE: 60 MMHG | HEART RATE: 70 BPM | OXYGEN SATURATION: 95 % | SYSTOLIC BLOOD PRESSURE: 124 MMHG | HEIGHT: 61 IN

## 2021-12-20 DIAGNOSIS — E11.65 UNCONTROLLED TYPE 2 DIABETES MELLITUS WITH HYPERGLYCEMIA (HCC): ICD-10-CM

## 2021-12-20 DIAGNOSIS — F33.9 RECURRENT MAJOR DEPRESSIVE DISORDER, REMISSION STATUS UNSPECIFIED (HCC): ICD-10-CM

## 2021-12-20 DIAGNOSIS — I10 ESSENTIAL HYPERTENSION: ICD-10-CM

## 2021-12-20 DIAGNOSIS — E03.9 HYPOTHYROIDISM, UNSPECIFIED TYPE: ICD-10-CM

## 2021-12-20 DIAGNOSIS — E78.5 DYSLIPIDEMIA: ICD-10-CM

## 2021-12-20 PROCEDURE — 99214 OFFICE O/P EST MOD 30 MIN: CPT | Performed by: FAMILY MEDICINE

## 2021-12-20 RX ORDER — LANCETS 30 GAUGE
EACH MISCELLANEOUS
Qty: 150 EACH | Refills: 1 | Status: SHIPPED | OUTPATIENT
Start: 2021-12-20 | End: 2023-02-07

## 2021-12-20 ASSESSMENT — PATIENT HEALTH QUESTIONNAIRE - PHQ9: CLINICAL INTERPRETATION OF PHQ2 SCORE: 0

## 2021-12-20 ASSESSMENT — FIBROSIS 4 INDEX: FIB4 SCORE: 1.15

## 2021-12-21 NOTE — PROGRESS NOTES
"Subjective     Bernadette Freitas is a 71 y.o. female who presents with Diabetes            HPI     Patient returns for follow-up of her medical problems.    For diabetes mellitus type 2, patient continues to take all her meds which are Metformin 1000 mg twice a day, Jardiance 25 mg daily and glipizide 5 mg half a tablet daily.  She said she only takes half a tablet because a full tablet makes her weak.  Blood work was done before this visit.  She needs new glucometer and testing supplies as hers is already old and is not anymore working.    In terms of hypertension, this is under control on metoprolol and losartan.    She continues to take atorvastatin without side effects.    For hypothyroidism, patient continues to take thyroid replacement.    She continues to take Lexapro for depression.  This is working very well for her.  She has not tried to see if she will do okay without this.  She said she has been on this for years.    In the interim, patient was seen at urgent care a month ago for acute sinusitis and shingles infection involving the left trapezius area/left supraclavicular area.  She was treated with Augmentin for 5 days and Valtrex for 7 days with resolution of her problem.  She said she only had a little bit of discomfort from the rash and the rash cleared right away with the antiviral.  Patient has not shingles vaccine before.    Patient needs booster shot for Covid and flu shot.    Past medical history, past surgical history, family history reviewed-no changes    Social history reviewed-no changes    Allergies reviewed-no changes    Medications reviewed-no changes    ROS     As per HPI, the rest are negative.         Objective     /60 (BP Location: Left arm, Patient Position: Sitting, BP Cuff Size: Adult)   Pulse 70   Temp 36.3 °C (97.3 °F) (Temporal)   Ht 1.549 m (5' 1\")   Wt 70.1 kg (154 lb 8.7 oz)   LMP 01/20/2008   SpO2 95%   BMI 29.20 kg/m²      Physical Exam     Examined alert, " awake, oriented, not in distress    Neck-supple, no lymphadenopathy, no thyromegaly  Lungs-clear to auscultation, no rales, no wheezes  Heart-regular rate and rhythm, no murmur  Extremities-no edema, clubbing, cyanosis           Hospital Outpatient Visit on 12/18/2021   Component Date Value Ref Range Status   • TSH 12/18/2021 0.980  0.380 - 5.330 uIU/mL Final    Comment: Reference Range:    Pregnant Females, 1st Trimester  0.050-3.700  Pregnant Females, 2nd Trimester  0.310-4.350  Pregnant Females, 3rd Trimester  0.410-5.180     • Creatinine, Urine 12/18/2021 37.27  mg/dL Final   • Microalbumin, Urine Random 12/18/2021 <1.2  mg/dL Final   • Micro Alb Creat Ratio 12/18/2021 see below  0 - 30 mg/g Final    Comment: Unable to calculate the microalbumin/creatinine ratio due to  the microalbumin result or the urine creatinine result being  outside the measurement range of the analyzer.     • Glycohemoglobin 12/18/2021 7.2* 4.0 - 5.6 % Final    Comment: Increased risk for diabetes:  5.7 -6.4%  Diabetes:  >6.4%  Glycemic control for adults with diabetes:  <7.0%    The above interpretations are per ADA guidelines.  Diagnosis  of diabetes mellitus on the basis of elevated Hemoglobin A1c  should be confirmed by repeating the Hb A1c test.     • Est Avg Glucose 12/18/2021 160  mg/dL Final    Comment: The eAG calculation is based on the A1c-Derived Daily Glucose  (ADAG) study.  See the ADA's website for additional information.     • Sodium 12/18/2021 141  135 - 145 mmol/L Final   • Potassium 12/18/2021 4.6  3.6 - 5.5 mmol/L Final   • Chloride 12/18/2021 104  96 - 112 mmol/L Final   • Co2 12/18/2021 24  20 - 33 mmol/L Final   • Anion Gap 12/18/2021 13.0  7.0 - 16.0 Final   • Glucose 12/18/2021 117* 65 - 99 mg/dL Final   • Bun 12/18/2021 16  8 - 22 mg/dL Final   • Creatinine 12/18/2021 0.73  0.50 - 1.40 mg/dL Final   • Calcium 12/18/2021 9.9  8.5 - 10.5 mg/dL Final   • AST(SGOT) 12/18/2021 25  12 - 45 U/L Final   • ALT(SGPT)  12/18/2021 34  2 - 50 U/L Final   • Alkaline Phosphatase 12/18/2021 45  30 - 99 U/L Final   • Total Bilirubin 12/18/2021 0.3  0.1 - 1.5 mg/dL Final   • Albumin 12/18/2021 4.7  3.2 - 4.9 g/dL Final   • Total Protein 12/18/2021 7.3  6.0 - 8.2 g/dL Final   • Globulin 12/18/2021 2.6  1.9 - 3.5 g/dL Final   • A-G Ratio 12/18/2021 1.8  g/dL Final   • Cholesterol,Tot 12/18/2021 155  100 - 199 mg/dL Final   • Triglycerides 12/18/2021 153* 0 - 149 mg/dL Final   • HDL 12/18/2021 57  >=40 mg/dL Final   • LDL 12/18/2021 67  <100 mg/dL Final   • Fasting Status 12/18/2021 Fasting   Final   • GFR If  12/18/2021 >60  >60 mL/min/1.73 m 2 Final   • GFR If Non  12/18/2021 >60  >60 mL/min/1.73 m 2 Final                  Assessment & Plan        1. Uncontrolled type 2 diabetes mellitus with hyperglycemia (HCC)  Hemoglobin A1c slightly elevated at 7.2.  She does not want me to increase the dose of her glipizide and she said she will work harder on getting this better at 7.0 or lower.  Continue current meds and watch her diet more closely, exercise regularly and lose weight.  - Blood Glucose Monitoring Suppl Device; Meter: Dispense Device of Insurance Preference. Sig. Use as directed for blood sugar monitoring. #1. NR.  Dispense: 1 Each; Refill: 0  - Blood Glucose Test Strips; Test strips order: Test strips for glucometer. Sig: use once daily before breakfast.  Dispense: 150 Strip; Refill: 1  - Lancets; Lancets order: Lancets for glucometer. Sig: use once daily before breakfast.  Dispense: 150 Each; Refill: 1  - Lipid Profile; Future  - Comp Metabolic Panel; Future  - HEMOGLOBIN A1C; Future  - TSH; Future  - CBC WITH DIFFERENTIAL; Future    2. Essential hypertension  Controlled on her meds.  - Lipid Profile; Future  - Comp Metabolic Panel; Future  - HEMOGLOBIN A1C; Future  - TSH; Future  - CBC WITH DIFFERENTIAL; Future    3. Dyslipidemia  All at target except for triglycerides are still slightly high but  advised work hard on avoidance of sweets and decreasing the carbs.  - Lipid Profile; Future  - Comp Metabolic Panel; Future  - HEMOGLOBIN A1C; Future  - TSH; Future  - CBC WITH DIFFERENTIAL; Future    4. Hypothyroidism, unspecified type  Adequately replaced.  Continue same dose of levothyroxine.  - Lipid Profile; Future  - Comp Metabolic Panel; Future  - HEMOGLOBIN A1C; Future  - TSH; Future  - CBC WITH DIFFERENTIAL; Future    5. Recurrent major depressive disorder, remission status unspecified (HCC)  Stable on Lexapro.  She will try to take every other day and if she is doing well she can discontinue after a month.  If she starts having symptoms of depression and she needs to go back on taking it daily.  - Lipid Profile; Future  - Comp Metabolic Panel; Future  - HEMOGLOBIN A1C; Future  - TSH; Future  - CBC WITH DIFFERENTIAL; Future    Follow-up in 4 months or sooner if needed.      Please note that this dictation was created using voice recognition software. I have worked with consultants from the vendor as well as technical experts from [facility]  [employer] to optimize the interface. I have made every reasonable attempt to correct obvious errors, but I expect that there are errors of grammar and possibly content I did not discover before finalizing the note.

## 2022-01-03 DIAGNOSIS — I10 ESSENTIAL HYPERTENSION: ICD-10-CM

## 2022-01-03 DIAGNOSIS — E11.65 UNCONTROLLED TYPE 2 DIABETES MELLITUS WITH HYPERGLYCEMIA (HCC): ICD-10-CM

## 2022-01-03 DIAGNOSIS — K21.9 GASTROESOPHAGEAL REFLUX DISEASE: ICD-10-CM

## 2022-01-03 DIAGNOSIS — E03.9 HYPOTHYROIDISM, UNSPECIFIED TYPE: ICD-10-CM

## 2022-01-03 DIAGNOSIS — E78.5 DYSLIPIDEMIA: ICD-10-CM

## 2022-01-03 DIAGNOSIS — F33.9 RECURRENT MAJOR DEPRESSIVE DISORDER, REMISSION STATUS UNSPECIFIED (HCC): ICD-10-CM

## 2022-01-03 RX ORDER — ATORVASTATIN CALCIUM 40 MG/1
TABLET, FILM COATED ORAL
Qty: 90 TABLET | Refills: 1 | Status: SHIPPED | OUTPATIENT
Start: 2022-01-03 | End: 2023-02-07 | Stop reason: SDUPTHER

## 2022-01-03 RX ORDER — PANTOPRAZOLE SODIUM 40 MG/1
TABLET, DELAYED RELEASE ORAL
Qty: 90 TABLET | Refills: 1 | Status: SHIPPED | OUTPATIENT
Start: 2022-01-03 | End: 2023-02-07

## 2022-01-03 RX ORDER — METOPROLOL SUCCINATE 50 MG/1
TABLET, EXTENDED RELEASE ORAL
Qty: 90 TABLET | Refills: 1 | Status: SHIPPED | OUTPATIENT
Start: 2022-01-03 | End: 2023-02-07 | Stop reason: SDUPTHER

## 2022-01-03 RX ORDER — LEVOTHYROXINE SODIUM 0.07 MG/1
TABLET ORAL
Qty: 90 TABLET | Refills: 1 | Status: SHIPPED | OUTPATIENT
Start: 2022-01-03 | End: 2023-02-07 | Stop reason: SDUPTHER

## 2022-01-03 RX ORDER — EMPAGLIFLOZIN 25 MG/1
TABLET, FILM COATED ORAL
Qty: 90 TABLET | Refills: 1 | Status: SHIPPED | OUTPATIENT
Start: 2022-01-03 | End: 2023-02-07 | Stop reason: SDUPTHER

## 2022-01-03 RX ORDER — ESCITALOPRAM OXALATE 10 MG/1
TABLET ORAL
Qty: 90 TABLET | Refills: 1 | Status: SHIPPED | OUTPATIENT
Start: 2022-01-03 | End: 2023-02-07 | Stop reason: SDUPTHER

## 2022-02-16 ENCOUNTER — APPOINTMENT (OUTPATIENT)
Dept: RADIOLOGY | Facility: MEDICAL CENTER | Age: 72
End: 2022-02-16
Attending: FAMILY MEDICINE
Payer: MEDICARE

## 2022-02-16 DIAGNOSIS — Z12.31 VISIT FOR SCREENING MAMMOGRAM: ICD-10-CM

## 2022-11-07 ENCOUNTER — PATIENT MESSAGE (OUTPATIENT)
Dept: HEALTH INFORMATION MANAGEMENT | Facility: OTHER | Age: 72
End: 2022-11-07

## 2023-02-07 ENCOUNTER — OFFICE VISIT (OUTPATIENT)
Dept: MEDICAL GROUP | Facility: PHYSICIAN GROUP | Age: 73
End: 2023-02-07
Payer: COMMERCIAL

## 2023-02-07 VITALS
WEIGHT: 150 LBS | HEART RATE: 66 BPM | HEIGHT: 61 IN | TEMPERATURE: 98.5 F | DIASTOLIC BLOOD PRESSURE: 80 MMHG | BODY MASS INDEX: 28.32 KG/M2 | OXYGEN SATURATION: 96 % | SYSTOLIC BLOOD PRESSURE: 122 MMHG

## 2023-02-07 DIAGNOSIS — E78.5 DYSLIPIDEMIA: ICD-10-CM

## 2023-02-07 DIAGNOSIS — K21.9 GASTROESOPHAGEAL REFLUX DISEASE, UNSPECIFIED WHETHER ESOPHAGITIS PRESENT: ICD-10-CM

## 2023-02-07 DIAGNOSIS — E11.65 UNCONTROLLED TYPE 2 DIABETES MELLITUS WITH HYPERGLYCEMIA (HCC): ICD-10-CM

## 2023-02-07 DIAGNOSIS — F33.9 RECURRENT MAJOR DEPRESSIVE DISORDER, REMISSION STATUS UNSPECIFIED (HCC): ICD-10-CM

## 2023-02-07 DIAGNOSIS — E03.9 HYPOTHYROIDISM, UNSPECIFIED TYPE: ICD-10-CM

## 2023-02-07 DIAGNOSIS — I10 PRIMARY HYPERTENSION: ICD-10-CM

## 2023-02-07 DIAGNOSIS — I10 ESSENTIAL HYPERTENSION: ICD-10-CM

## 2023-02-07 LAB
HBA1C MFR BLD: 7.9 % (ref ?–5.8)
POCT INT CON NEG: NEGATIVE
POCT INT CON POS: POSITIVE

## 2023-02-07 PROCEDURE — 99215 OFFICE O/P EST HI 40 MIN: CPT | Performed by: NURSE PRACTITIONER

## 2023-02-07 PROCEDURE — 83036 HEMOGLOBIN GLYCOSYLATED A1C: CPT | Performed by: NURSE PRACTITIONER

## 2023-02-07 RX ORDER — GLIPIZIDE 5 MG/1
TABLET ORAL
COMMUNITY
Start: 2023-01-10 | End: 2023-02-07

## 2023-02-07 RX ORDER — LOSARTAN POTASSIUM 100 MG/1
100 TABLET ORAL DAILY
Qty: 90 TABLET | Refills: 3 | Status: SHIPPED | OUTPATIENT
Start: 2023-02-07 | End: 2024-03-11

## 2023-02-07 RX ORDER — CICLOPIROX 80 MG/ML
SOLUTION TOPICAL
COMMUNITY
Start: 2022-12-21 | End: 2023-02-07

## 2023-02-07 RX ORDER — CITALOPRAM HYDROBROMIDE 10 MG/1
TABLET ORAL
COMMUNITY
End: 2023-02-07

## 2023-02-07 RX ORDER — ATORVASTATIN CALCIUM 40 MG/1
40 TABLET, FILM COATED ORAL EVERY EVENING
Qty: 90 TABLET | Refills: 3 | Status: SHIPPED | OUTPATIENT
Start: 2023-02-07

## 2023-02-07 RX ORDER — CICLOPIROX 80 MG/ML
SOLUTION TOPICAL
COMMUNITY
Start: 2022-12-22 | End: 2023-02-07

## 2023-02-07 RX ORDER — EMPAGLIFLOZIN 25 MG/1
1 TABLET, FILM COATED ORAL DAILY
Qty: 90 TABLET | Refills: 3 | Status: SHIPPED | OUTPATIENT
Start: 2023-02-07 | End: 2023-07-03

## 2023-02-07 RX ORDER — FAMOTIDINE 20 MG/1
TABLET, FILM COATED ORAL
COMMUNITY
Start: 2022-12-16 | End: 2023-02-07

## 2023-02-07 RX ORDER — FAMOTIDINE 40 MG/1
40 TABLET, FILM COATED ORAL
COMMUNITY
Start: 2023-01-10 | End: 2023-02-07

## 2023-02-07 RX ORDER — LEVOTHYROXINE SODIUM 0.07 MG/1
75 TABLET ORAL
Qty: 90 TABLET | Refills: 3 | Status: SHIPPED | OUTPATIENT
Start: 2023-02-07 | End: 2023-11-06 | Stop reason: SDUPTHER

## 2023-02-07 RX ORDER — GLIPIZIDE 5 MG/1
5 TABLET, FILM COATED, EXTENDED RELEASE ORAL DAILY
Qty: 90 TABLET | Refills: 3 | Status: SHIPPED | OUTPATIENT
Start: 2023-02-07 | End: 2023-12-12

## 2023-02-07 RX ORDER — METOPROLOL SUCCINATE 50 MG/1
50 TABLET, EXTENDED RELEASE ORAL DAILY
Qty: 90 TABLET | Refills: 3 | Status: SHIPPED | OUTPATIENT
Start: 2023-02-07 | End: 2023-07-03

## 2023-02-07 RX ORDER — DEXLANSOPRAZOLE 60 MG/1
60 CAPSULE, DELAYED RELEASE ORAL DAILY
Qty: 30 CAPSULE | Refills: 0 | Status: SHIPPED | OUTPATIENT
Start: 2023-02-07 | End: 2023-07-03

## 2023-02-07 RX ORDER — ESCITALOPRAM OXALATE 10 MG/1
10 TABLET ORAL DAILY
Qty: 90 TABLET | Refills: 3 | Status: SHIPPED | OUTPATIENT
Start: 2023-02-07

## 2023-02-07 ASSESSMENT — FIBROSIS 4 INDEX: FIB4 SCORE: 1.02

## 2023-02-07 ASSESSMENT — ENCOUNTER SYMPTOMS
SHORTNESS OF BREATH: 0
HEADACHES: 0
DIZZINESS: 0
NAUSEA: 0
PALPITATIONS: 0
CHILLS: 0
WHEEZING: 0
DEPRESSION: 0
ABDOMINAL PAIN: 0
FEVER: 0
HEARTBURN: 0
COUGH: 0
VOMITING: 0

## 2023-02-07 ASSESSMENT — PATIENT HEALTH QUESTIONNAIRE - PHQ9: CLINICAL INTERPRETATION OF PHQ2 SCORE: 0

## 2023-02-07 NOTE — LETTER
[facility]  ANAM EscaleraP.RBARRETT  [address]  [place] [address]  Fax: [phone]   Authorization for Release/Disclosure of   Protected Health Information   Name: BERNADETTE PARRISH : 1950 SSN: [id number]   Address: [address] [place] [address] Drive  [place] [address] Phone:    [phone] (home)    I authorize the entity listed below to release/disclose the PHI below to:   [facility]/SPENCER Escalera.P.RBARRETT and GERALDO Escalera   Provider or Entity Name:  Dr. Dos Santos   Address   City, [facility], [facility]   Phone:      Fax:     Reason for request: continuity of care   Information to be released:    [XX] LAST COLONOSCOPY,  including any PATH REPORT and follow-up  [  ] LAST FIT/COLOGUARD RESULT [  ] LAST DEXA  [  ] LAST MAMMOGRAM  [  ] LAST PAP  [  ] LAST LABS [  ] RETINA EXAM REPORT  [  ] IMMUNIZATION RECORDS  [  ] Release all info      [  ] Check here and initial the line next to each item to release ALL health information INCLUDING  _____ Care and treatment for drug and / or alcohol abuse  _____ HIV testing, infection status, or AIDS  _____ Genetic Testing    DATES OF SERVICE OR TIME PERIOD TO BE DISCLOSED: _____________  I understand and acknowledge that:  * This Authorization may be revoked at any time by you in writing, except if your health information has already been used or disclosed.  * Your health information that will be used or disclosed as a result of you signing this authorization could be re-disclosed by the recipient. If this occurs, your re-disclosed health information may no longer be protected by State or Federal laws.  * You may refuse to sign this Authorization. Your refusal will not affect your ability to obtain treatment.  * This Authorization becomes effective upon signing and will  on (date) __________.      If no date is indicated, this Authorization will  one (1) year from the signature date.    Name: Bernadette Guerrero  Fortino    Signature:   Date:     2/7/2023       PLEASE FAX REQUESTED RECORDS BACK TO: [phone]

## 2023-02-08 NOTE — PROGRESS NOTES
Subjective:     Chief Complaint   Patient presents with    Establish Care       HPI:   Bernadette presents today with     Problem   Uncontrolled type 2 diabetes mellitus with hyperglycemia (HCC)    This is a chronic condition.  Current medications: metformin 1000 mg twice daily, glipizide 5 mg extended release daily, Jardiance 25 mg daily.  Last A1c: 7.9, patient states that significant increase in A1c is likely related to change in sugary foods in her diet over the last few months.  States that she has not been paying attention to diet.  Last Microalb/Cr ratio: ordered   Fasting sugars: Patient states a little higher lately  Last diabetic foot exam: Monofilament testing with a 10 gram force: sensation intact: intact bilaterally  Visual Inspection: Feet without maceration, ulcers, fissures.  Pedal pulses: intact bilaterally    Neuropathy s/s:  Last retinal eye exam:  ACEi/ARB?  Losartan 100 mg   Statin?  Atorvastatin 40 mg  Concomitant HTN?  Positive hypertension blood pressure today is 122/80.    Nightly foot checks?  Patient states she does  S/S with low bs? Yes.       Gerd (Gastroesophageal Reflux Disease)    Chronic in nature. States daily symptoms started getting worse symptoms and had endoscopy with GERD, its after her endoscopy she was put on pantoprazole 40 mg daily but was told to take this only for short time she was then also taking Pepcid 20 mg at night. States that she does drink 1 cup of coffee. States stopped spicy foods. States sugar and fattier foods will sometimes also increase symptoms.     Htn (Hypertension)    Chronic in nature.  Stable.  Blood pressure today is 122/80.  Patient denies any chest pain, palpitations, dizziness, blurry vision.     Hypothyroidism    Chronic in nature.  Stable.  Continues levothyroxine 75 mcg by mouth daily as this is working well denies any palpitations, dizziness, sweating, cold or hot intolerance.     Dyslipidemia    Chronic in nature.  Stable on last labs.  Patient is  taking atorvastatin 40 mg p.o. daily without side effects     Uncontrolled type 2 diabetes mellitus, without long-term current use of insulin (Resolved)   Essential Hypertension (Resolved)       Current Outpatient Medications Ordered in Epic   Medication Sig Dispense Refill    losartan (COZAAR) 100 MG Tab Take 1 Tablet by mouth every day. 90 Tablet 3    glipiZIDE SR (GLUCOTROL) 5 MG TABLET SR 24 HR Take 1 Tablet by mouth every day. 90 Tablet 3    metformin (GLUCOPHAGE) 1000 MG tablet Take 1 Tablet by mouth 2 times a day with meals. 180 Tablet 3    Dexlansoprazole (DEXILANT) 60 MG CAPSULE DELAYED RELEASE delayed-release capsule Take 60 mg by mouth every day. 30 Capsule 0    escitalopram (LEXAPRO) 10 MG Tab Take 1 Tablet by mouth every day. 90 Tablet 3    atorvastatin (LIPITOR) 40 MG Tab Take 1 Tablet by mouth every evening. 90 Tablet 3    levothyroxine (SYNTHROID) 75 MCG Tab Take 1 Tablet by mouth every morning on an empty stomach. 90 Tablet 3    Empagliflozin (JARDIANCE) 25 MG Tab Take 1 Tablet by mouth every day. 90 Tablet 3    metoprolol SR (TOPROL XL) 50 MG TABLET SR 24 HR Take 1 Tablet by mouth every day. 90 Tablet 3    Blood Glucose Monitoring Suppl Device Meter: Dispense Device of Insurance Preference. Sig. Use as directed for blood sugar monitoring. #1. NR. 1 Each 0    fluticasone (FLONASE) 50 MCG/ACT nasal spray SHAKE LIQUID AND USE 2 SPRAYS IN EACH NOSTRIL ONCE DAILY 16 g 2    ONETOUCH ULTRA strip USE  STRIP TO CHECK GLUCOSE ONCE DAILY BEFORE  BREAKFAST  AND  AS  NEEDED  FOR  SYMPTOMS  OF  HIGH  OR  LOW  BLOOD  SUGARS 150 Strip 1    glucose blood (ONE TOUCH ULTRA TEST) strip USE ONCE DAILY BEFORE BREAKFAST AND AS NEEDED FOR SYMPTOMS OF HIGH OR LOW BLOOS SUGAR 100 Strip 5    ONETOUCH DELICA LANCETS 33G Misc USE ONCE DAILY BEFORE BREAKFAST AND AS NEEDED FOR SYMPTOMS OF HIGH OR LOW BLOOD SUGAR 100 Each 5     No current Epic-ordered facility-administered medications on file.       Health Maintenance: Patient  "states that she has completed the recommended vaccines, refuses retinal screening in office today as she has an appointment to complete this in March, had a colonoscopy completed in November records requested    Review of Systems   Constitutional:  Negative for chills, fever and malaise/fatigue.   HENT:  Negative for hearing loss.    Respiratory:  Negative for cough, shortness of breath and wheezing.    Cardiovascular:  Negative for chest pain, palpitations and leg swelling.   Gastrointestinal:  Negative for abdominal pain, heartburn, nausea and vomiting.   Neurological:  Negative for dizziness and headaches.   Psychiatric/Behavioral:  Negative for depression and suicidal ideas.        Objective:     Exam:  /80 (BP Location: Left arm, Patient Position: Sitting, BP Cuff Size: Adult)   Pulse 66   Temp 36.9 °C (98.5 °F) (Tympanic)   Ht 1.549 m (5' 1\")   Wt 68 kg (150 lb)   LMP 01/20/2008   SpO2 96%   BMI 28.34 kg/m²  Body mass index is 28.34 kg/m².    Physical Exam  Constitutional:       Appearance: Normal appearance.   HENT:      Head: Normocephalic and atraumatic.   Cardiovascular:      Rate and Rhythm: Normal rate and regular rhythm.   Pulmonary:      Effort: Pulmonary effort is normal.      Breath sounds: Normal breath sounds.   Abdominal:      General: Abdomen is flat.   Skin:     General: Skin is warm and dry.      Capillary Refill: Capillary refill takes less than 2 seconds.   Neurological:      General: No focal deficit present.      Mental Status: She is alert and oriented to person, place, and time.     Assessment & Plan:     72 y.o. female with the following -     Problem List Items Addressed This Visit       Depression    Relevant Medications    escitalopram (LEXAPRO) 10 MG Tab    Dyslipidemia     - Continue atorvastatin 40 mg by mouth daily         Relevant Medications    atorvastatin (LIPITOR) 40 MG Tab    GERD (gastroesophageal reflux disease)     - switch to Dexilant 60 mg daily, discussed " with patient that this may require prior authorization  -recommended that patient schedule a follow-up with gastroenterology considering worsening symptoms  -We will add famotidine 20 mg twice daily         Relevant Medications    Dexlansoprazole (DEXILANT) 60 MG CAPSULE DELAYED RELEASE delayed-release capsule    Other Relevant Orders    VITAMIN B12    HTN (hypertension)     - Continue losartan 100 mg by mouth daily.         Relevant Medications    losartan (COZAAR) 100 MG Tab    atorvastatin (LIPITOR) 40 MG Tab    metoprolol SR (TOPROL XL) 50 MG TABLET SR 24 HR    Other Relevant Orders    Comp Metabolic Panel    Hemoglobin A1c    Lipid Profile    Microalbumin Creat Ratio Urine - Lab Collect    Hypothyroidism     - Continue Synthroid 75 mcg daily         Relevant Medications    levothyroxine (SYNTHROID) 75 MCG Tab    Other Relevant Orders    TSH WITH REFLEX TO FT4    Uncontrolled type 2 diabetes mellitus with hyperglycemia (HCC)     - Continue current medications including glipizide 5 mg, metformin at 1000 mg twice daily, Jardiance 25 mg  -Check in 3 months, patient states that she is going to make significant diet changes         Relevant Medications    glipiZIDE SR (GLUCOTROL) 5 MG TABLET SR 24 HR    metformin (GLUCOPHAGE) 1000 MG tablet    Empagliflozin (JARDIANCE) 25 MG Tab     Other Visit Diagnoses       Essential hypertension        Relevant Medications    losartan (COZAAR) 100 MG Tab    atorvastatin (LIPITOR) 40 MG Tab    metoprolol SR (TOPROL XL) 50 MG TABLET SR 24 HR            I spent a total of 40 minutes with record review, exam, communication with the patient, communication with other providers, and documentation of this encounter.      Return in about 3 months (around 5/7/2023), or if symptoms worsen or fail to improve.    I have placed the below orders and discussed them with an approved delegating provider. The MA is performing the below orders under the direction of Dr. Reyna.     Please note that  this dictation was created using voice recognition software. I have made every reasonable attempt to correct obvious errors, but I expect that there are errors of grammar and possibly content that I did not discover before finalizing the note.

## 2023-02-08 NOTE — ASSESSMENT & PLAN NOTE
- switch to Dexilant 60 mg daily, discussed with patient that this may require prior authorization  -recommended that patient schedule a follow-up with gastroenterology considering worsening symptoms  -We will add famotidine 20 mg twice daily

## 2023-02-08 NOTE — ASSESSMENT & PLAN NOTE
- Continue current medications including glipizide 5 mg, metformin at 1000 mg twice daily, Jardiance 25 mg  -Check in 3 months, patient states that she is going to make significant diet changes

## 2023-03-07 NOTE — ASSESSMENT & PLAN NOTE
- Continue losartan 100 mg by mouth daily.   0800 Bedside shift change report given to [address] (oncoming nurse) by Juan Jo (offgoing nurse). Report included the following information SBAR, Kardex, ED Summary, OR Summary, Procedure Summary, Intake/Output, MAR, Accordion, Recent Results, and Cardiac Rhythm NSR . Assessment completed and LVAD settings noted. 0830 Dr Rory Corona at [address] for trach at 1300. RN to hold TF NOW. RN to hold heparin at 1000.     0900 HF NP Lizette at bedside- continue current plan of care. 1200 Levo STOPPED- MAPs in high 80s-90s. PI up to 8.9.    1250 Levo RESTARTED at 2mcg for MAPs in 50s. 18 Two Intensivists, RT and primary RN at bedside preparing for bedside tracheostomy and bronchoscopy. 1400 Low Flow alarms sounding- PI up to 10.4, MAPs 90s. Levo STOPPED, precedex titrated to 1.5mcg. 1415 Procedure complete- patient tolerated well. #8 Shiley cuffed and placed on ventilator. A total of 50mg rocuronium, 6mg versed, and 200mcg fentanyl given throughout procedure. STAT CXR ordered. Orders given to restart heparin gtt at 1600.    1430 Levo RESTARTED at [address] for MAPs in 46s. Tube Feedings resumed per MD.    0280 Dr Rory Corona at bedside- begin sedation weaning. Precedex weaned to 1.2mcg.    1605 Heparin gtt RESTARTED; verified with Pharmacy. Next PTT due to 2200.    1700 Precedex weaned to 1mcg. LVAD dressing changed daily per order. Some excoriation and possible tunneling noted. NP Lizette informed of LVAD site. Subglottic suction on low intermittent per MD.    1735 Dialysis RN at bedside. 1930 Bedside shift change report given to Juan Jo (oncoming nurse) by La Taylor RN (offgoing nurse). Report included the following information SBAR, Kardex, ED Summary, OR Summary, Procedure Summary, Intake/Output, MAR, Accordion, Recent Results, and Cardiac Rhythm NSR .

## 2023-03-17 ENCOUNTER — OFFICE VISIT (OUTPATIENT)
Dept: URGENT CARE | Facility: PHYSICIAN GROUP | Age: 73
End: 2023-03-17
Payer: COMMERCIAL

## 2023-03-17 VITALS
HEART RATE: 76 BPM | BODY MASS INDEX: 27.56 KG/M2 | RESPIRATION RATE: 14 BRPM | DIASTOLIC BLOOD PRESSURE: 76 MMHG | TEMPERATURE: 97.1 F | SYSTOLIC BLOOD PRESSURE: 114 MMHG | WEIGHT: 146 LBS | HEIGHT: 61 IN | OXYGEN SATURATION: 93 %

## 2023-03-17 DIAGNOSIS — R05.1 ACUTE COUGH: ICD-10-CM

## 2023-03-17 DIAGNOSIS — R09.81 NASAL CONGESTION WITH RHINORRHEA: ICD-10-CM

## 2023-03-17 DIAGNOSIS — J34.89 NASAL CONGESTION WITH RHINORRHEA: ICD-10-CM

## 2023-03-17 DIAGNOSIS — J01.90 ACUTE SINUSITIS, RECURRENCE NOT SPECIFIED, UNSPECIFIED LOCATION: ICD-10-CM

## 2023-03-17 PROCEDURE — 99213 OFFICE O/P EST LOW 20 MIN: CPT | Performed by: NURSE PRACTITIONER

## 2023-03-17 RX ORDER — AMOXICILLIN AND CLAVULANATE POTASSIUM 875; 125 MG/1; MG/1
1 TABLET, FILM COATED ORAL 2 TIMES DAILY
Qty: 14 TABLET | Refills: 0 | Status: SHIPPED | OUTPATIENT
Start: 2023-03-17 | End: 2023-03-24

## 2023-03-17 ASSESSMENT — ENCOUNTER SYMPTOMS
GASTROINTESTINAL NEGATIVE: 1
CARDIOVASCULAR NEGATIVE: 1
FEVER: 1
NEUROLOGICAL NEGATIVE: 1
SINUS PAIN: 1
COUGH: 1
SHORTNESS OF BREATH: 0
SPUTUM PRODUCTION: 1
MUSCULOSKELETAL NEGATIVE: 1
SORE THROAT: 0
WHEEZING: 0
CHILLS: 0

## 2023-03-17 ASSESSMENT — FIBROSIS 4 INDEX: FIB4 SCORE: 1.02

## 2023-03-18 NOTE — PROGRESS NOTES
Subjective     Bernadette Freitas is a 72 y.o. female who presents with Cough (X 1 week ) and Fever (Started last night )            Cough  Associated symptoms include a fever. Pertinent negatives include no chills, ear pain, sore throat, shortness of breath or wheezing.   Fever   Associated symptoms include congestion and coughing. Pertinent negatives include no ear pain, sore throat or wheezing.   Has been experiencing nasal congestion, postnasal drainage, cough x1 week.  States getting yellow-green nasal discharge and phlegm with cough.  Denies shortness of breath, wheeze or history of asthma.  States had low-grade fever of 100.3 last night and took Tylenol.  States no fever today but did take Tylenol approximately 3 hours ago.  Flushing nasal passages daily.  Denies sore throat, no noted nausea, vomiting or diarrhea and is able to eat and drink with no problems.    PMH:  has a past medical history of Depression, Diabetes (HCC), Dyslipidemia, HTN (hypertension), Hypercholesteremia, Hypothyroidism, and Impaired fasting glucose.    She has no past medical history of CAD (coronary artery disease), Infectious disease, Liver disease, or Seizure disorder (HCC).  MEDS:   Current Outpatient Medications:     amoxicillin-clavulanate (AUGMENTIN) 875-125 MG Tab, Take 1 Tablet by mouth 2 times a day for 7 days., Disp: 14 Tablet, Rfl: 0    losartan (COZAAR) 100 MG Tab, Take 1 Tablet by mouth every day., Disp: 90 Tablet, Rfl: 3    glipiZIDE SR (GLUCOTROL) 5 MG TABLET SR 24 HR, Take 1 Tablet by mouth every day., Disp: 90 Tablet, Rfl: 3    metformin (GLUCOPHAGE) 1000 MG tablet, Take 1 Tablet by mouth 2 times a day with meals., Disp: 180 Tablet, Rfl: 3    escitalopram (LEXAPRO) 10 MG Tab, Take 1 Tablet by mouth every day., Disp: 90 Tablet, Rfl: 3    atorvastatin (LIPITOR) 40 MG Tab, Take 1 Tablet by mouth every evening., Disp: 90 Tablet, Rfl: 3    levothyroxine (SYNTHROID) 75 MCG Tab, Take 1 Tablet by mouth every morning on an  empty stomach., Disp: 90 Tablet, Rfl: 3    Empagliflozin (JARDIANCE) 25 MG Tab, Take 1 Tablet by mouth every day., Disp: 90 Tablet, Rfl: 3    metoprolol SR (TOPROL XL) 50 MG TABLET SR 24 HR, Take 1 Tablet by mouth every day., Disp: 90 Tablet, Rfl: 3    Blood Glucose Monitoring Suppl Device, Meter: Dispense Device of Insurance Preference. Sig. Use as directed for blood sugar monitoring. #1. NR., Disp: 1 Each, Rfl: 0    fluticasone (FLONASE) 50 MCG/ACT nasal spray, SHAKE LIQUID AND USE 2 SPRAYS IN EACH NOSTRIL ONCE DAILY, Disp: 16 g, Rfl: 2    ONETOUCH ULTRA strip, USE  STRIP TO CHECK GLUCOSE ONCE DAILY BEFORE  BREAKFAST  AND  AS  NEEDED  FOR  SYMPTOMS  OF  HIGH  OR  LOW  BLOOD  SUGARS, Disp: 150 Strip, Rfl: 1    glucose blood (ONE TOUCH ULTRA TEST) strip, USE ONCE DAILY BEFORE BREAKFAST AND AS NEEDED FOR SYMPTOMS OF HIGH OR LOW BLOOS SUGAR, Disp: 100 Strip, Rfl: 5    ONETOUCH DELICA LANCETS 33G Misc, USE ONCE DAILY BEFORE BREAKFAST AND AS NEEDED FOR SYMPTOMS OF HIGH OR LOW BLOOD SUGAR, Disp: 100 Each, Rfl: 5    Dexlansoprazole (DEXILANT) 60 MG CAPSULE DELAYED RELEASE delayed-release capsule, Take 60 mg by mouth every day. (Patient not taking: Reported on 3/17/2023), Disp: 30 Capsule, Rfl: 0  ALLERGIES:   Allergies   Allergen Reactions    Penicillin G Unspecified     Can tolerate Augmentin     SURGHX:   Past Surgical History:   Procedure Laterality Date    COLONOSCOPY WITH POLYP  10/30/2017    polyp asc colon- adenoma, diverticulosis sigmoid and dec colon,int hemorhoids    COLONOSCOPY WITH POLYP  11/19/2013    2 polyps-benign polypoid and adenomatous,severe diverticulosis entire colon, medium external hemorrhoids,    BREAST BIOPSY  3/13    left breast-benign    COLONOSCOPY  2008    polyp- tubular adenoma - [facility]    GASTROSCOPY  2008    acid reflux    CATARACT EXTRACTION WITH IOL Bilateral 03/03/2020, 5/4/2020    PRIMARY C SECTION      x1    THYROIDECTOMY TOTAL      toxic goiter     SOCHX:  reports that she  "quit smoking about 24 years ago. Her smoking use included cigarettes. She has never used smokeless tobacco. She reports current alcohol use. She reports that she does not use drugs.  FH: Family history was reviewed, no pertinent findings to report    Review of Systems   Constitutional:  Positive for fever. Negative for chills and malaise/fatigue.   HENT:  Positive for congestion and sinus pain. Negative for ear pain and sore throat.    Respiratory:  Positive for cough and sputum production. Negative for shortness of breath and wheezing.    Cardiovascular: Negative.    Gastrointestinal: Negative.    Musculoskeletal: Negative.    Neurological: Negative.             Objective     /76 (BP Location: Left arm, Patient Position: Sitting, BP Cuff Size: Large adult)   Pulse 76   Temp 36.2 °C (97.1 °F)   Resp 14   Ht 1.549 m (5' 1\")   Wt 66.2 kg (146 lb)   LMP 11/01/2006   SpO2 93%   BMI 27.59 kg/m²      Physical Exam  Vitals reviewed.   Constitutional:       General: She is awake. She is not in acute distress.     Appearance: Normal appearance. She is well-developed. She is not ill-appearing, toxic-appearing or diaphoretic.   HENT:      Head: Normocephalic.      Right Ear: Ear canal and external ear normal. A middle ear effusion is present.      Left Ear: Ear canal and external ear normal. A middle ear effusion is present.      Nose: Congestion and rhinorrhea present.      Mouth/Throat:      Lips: Pink.      Mouth: Mucous membranes are dry.      Pharynx: Oropharynx is clear. Uvula midline.   Eyes:      Conjunctiva/sclera: Conjunctivae normal.   Cardiovascular:      Rate and Rhythm: Normal rate.   Pulmonary:      Effort: Pulmonary effort is normal.      Breath sounds: Normal breath sounds and air entry.   Musculoskeletal:         General: Normal range of motion.      Cervical back: Normal range of motion and neck supple.   Skin:     General: Skin is warm and dry.   Neurological:      Mental Status: She is alert " and oriented to person, place, and time.   Psychiatric:         Attention and Perception: Attention normal.         Mood and Affect: Mood normal.         Speech: Speech normal.         Behavior: Behavior normal. Behavior is cooperative.                           Assessment & Plan        1. Acute sinusitis, recurrence not specified, unspecified location    - amoxicillin-clavulanate (AUGMENTIN) 875-125 MG Tab; Take 1 Tablet by mouth 2 times a day for 7 days.  Dispense: 14 Tablet; Refill: 0    2. Nasal congestion with rhinorrhea      3. Acute cough    -Maintain hydration/water intake  -Get rest  -May use over the counter Ibuprofen/Tylenol as needed for any fever, body aches or ear/sinus/throat pain  -May take longer acting antihistamine for nasal congestion/runny nose/post nasal drip symptoms (chose one like Claritin/Zyrtec/Allegra without decongestant) x 1 week then as needed   -May use over the counter saline nasal spray for nasal congestion/post nasal drip up to 4x/day  -May use over the counter Flonase or Nasocort for sinus nasal congestion/ear pressure as needed x 1 week then as needed   -May gargle with salt water as needed for any throat discomfort up to 4x/day (1 tsp salt in one cup warm water)  - Recommend plain Mucinex as needed for expectorant with cough/nasal congestion- avoid oral decongestants  -Monitor for worsening or persistent symptoms, increased facial pressure, dizziness, fever, worse cough- need re-evaluation in urgent care

## 2023-03-29 ENCOUNTER — TELEPHONE (OUTPATIENT)
Dept: MEDICAL GROUP | Facility: PHYSICIAN GROUP | Age: 73
End: 2023-03-29
Payer: COMMERCIAL

## 2023-03-29 NOTE — TELEPHONE ENCOUNTER
VOICEMAIL  1. Caller Name: Jai                       Call Back Number: [phone]     2. Message: Patient called and is requesting a call back     3. Patient approves office to leave a detailed voicemail/MyChart message: N\A      Phone Number Called: [phone]     Call outcome: Spoke to patient regarding message below.    Message: Spoke to patient and she stated she needed help scheduling appointment however was able to did not need further assistance.

## 2023-03-30 ENCOUNTER — HOSPITAL ENCOUNTER (OUTPATIENT)
Dept: LAB | Facility: MEDICAL CENTER | Age: 73
End: 2023-03-30
Attending: NURSE PRACTITIONER
Payer: COMMERCIAL

## 2023-03-30 DIAGNOSIS — I10 PRIMARY HYPERTENSION: ICD-10-CM

## 2023-03-30 DIAGNOSIS — E11.65 UNCONTROLLED TYPE 2 DIABETES MELLITUS WITH HYPERGLYCEMIA (HCC): ICD-10-CM

## 2023-03-30 DIAGNOSIS — K21.9 GASTROESOPHAGEAL REFLUX DISEASE, UNSPECIFIED WHETHER ESOPHAGITIS PRESENT: ICD-10-CM

## 2023-03-30 DIAGNOSIS — E03.9 HYPOTHYROIDISM, UNSPECIFIED TYPE: ICD-10-CM

## 2023-03-30 LAB
CREAT UR-MCNC: 41.25 MG/DL
EST. AVERAGE GLUCOSE BLD GHB EST-MCNC: 189 MG/DL
HBA1C MFR BLD: 8.2 % (ref 4–5.6)
MICROALBUMIN UR-MCNC: <1.2 MG/DL
MICROALBUMIN/CREAT UR: NORMAL MG/G (ref 0–30)

## 2023-03-30 PROCEDURE — 83036 HEMOGLOBIN GLYCOSYLATED A1C: CPT

## 2023-03-30 PROCEDURE — 82607 VITAMIN B-12: CPT

## 2023-03-30 PROCEDURE — 84443 ASSAY THYROID STIM HORMONE: CPT

## 2023-03-30 PROCEDURE — 80053 COMPREHEN METABOLIC PANEL: CPT

## 2023-03-30 PROCEDURE — 36415 COLL VENOUS BLD VENIPUNCTURE: CPT

## 2023-03-30 PROCEDURE — 82570 ASSAY OF URINE CREATININE: CPT

## 2023-03-30 PROCEDURE — 80061 LIPID PANEL: CPT

## 2023-03-30 PROCEDURE — 82043 UR ALBUMIN QUANTITATIVE: CPT

## 2023-03-31 LAB
ALBUMIN SERPL BCP-MCNC: 4.2 G/DL (ref 3.2–4.9)
ALBUMIN/GLOB SERPL: 1.4 G/DL
ALP SERPL-CCNC: 52 U/L (ref 30–99)
ALT SERPL-CCNC: 20 U/L (ref 2–50)
ANION GAP SERPL CALC-SCNC: 15 MMOL/L (ref 7–16)
AST SERPL-CCNC: 22 U/L (ref 12–45)
BILIRUB SERPL-MCNC: 0.4 MG/DL (ref 0.1–1.5)
BUN SERPL-MCNC: 14 MG/DL (ref 8–22)
CALCIUM ALBUM COR SERPL-MCNC: 9.4 MG/DL (ref 8.5–10.5)
CALCIUM SERPL-MCNC: 9.6 MG/DL (ref 8.5–10.5)
CHLORIDE SERPL-SCNC: 103 MMOL/L (ref 96–112)
CHOLEST SERPL-MCNC: 174 MG/DL (ref 100–199)
CO2 SERPL-SCNC: 21 MMOL/L (ref 20–33)
CREAT SERPL-MCNC: 0.75 MG/DL (ref 0.5–1.4)
GFR SERPLBLD CREATININE-BSD FMLA CKD-EPI: 85 ML/MIN/1.73 M 2
GLOBULIN SER CALC-MCNC: 2.9 G/DL (ref 1.9–3.5)
GLUCOSE SERPL-MCNC: 108 MG/DL (ref 65–99)
HDLC SERPL-MCNC: 51 MG/DL
LDLC SERPL CALC-MCNC: 86 MG/DL
POTASSIUM SERPL-SCNC: 4.1 MMOL/L (ref 3.6–5.5)
PROT SERPL-MCNC: 7.1 G/DL (ref 6–8.2)
SODIUM SERPL-SCNC: 139 MMOL/L (ref 135–145)
TRIGL SERPL-MCNC: 183 MG/DL (ref 0–149)
TSH SERPL DL<=0.005 MIU/L-ACNC: 1.22 UIU/ML (ref 0.38–5.33)
VIT B12 SERPL-MCNC: 310 PG/ML (ref 211–911)

## 2023-04-03 ENCOUNTER — OFFICE VISIT (OUTPATIENT)
Dept: MEDICAL GROUP | Facility: PHYSICIAN GROUP | Age: 73
End: 2023-04-03
Payer: COMMERCIAL

## 2023-04-03 VITALS
HEIGHT: 61 IN | TEMPERATURE: 98.5 F | DIASTOLIC BLOOD PRESSURE: 74 MMHG | SYSTOLIC BLOOD PRESSURE: 120 MMHG | BODY MASS INDEX: 27.6 KG/M2 | HEART RATE: 78 BPM | OXYGEN SATURATION: 94 % | WEIGHT: 146.2 LBS

## 2023-04-03 DIAGNOSIS — J06.9 VIRAL UPPER RESPIRATORY TRACT INFECTION: ICD-10-CM

## 2023-04-03 DIAGNOSIS — E11.65 UNCONTROLLED TYPE 2 DIABETES MELLITUS WITH HYPERGLYCEMIA (HCC): ICD-10-CM

## 2023-04-03 DIAGNOSIS — I10 PRIMARY HYPERTENSION: ICD-10-CM

## 2023-04-03 DIAGNOSIS — J35.1 SWELLING OF TONSIL: ICD-10-CM

## 2023-04-03 DIAGNOSIS — J06.9 ACUTE URI: ICD-10-CM

## 2023-04-03 DIAGNOSIS — B37.31 VAGINAL CANDIDIASIS: ICD-10-CM

## 2023-04-03 DIAGNOSIS — B37.31 YEAST VAGINITIS: ICD-10-CM

## 2023-04-03 DIAGNOSIS — J02.9 SORE THROAT: ICD-10-CM

## 2023-04-03 LAB
FLUAV RNA SPEC QL NAA+PROBE: NEGATIVE
FLUBV RNA SPEC QL NAA+PROBE: NEGATIVE
RSV RNA SPEC QL NAA+PROBE: NEGATIVE
S PYO DNA SPEC NAA+PROBE: NOT DETECTED
SARS-COV-2 RNA RESP QL NAA+PROBE: NEGATIVE

## 2023-04-03 PROCEDURE — 0241U POCT CEPHEID COV-2, FLU A/B, RSV - PCR: CPT | Performed by: NURSE PRACTITIONER

## 2023-04-03 PROCEDURE — 87651 STREP A DNA AMP PROBE: CPT | Performed by: NURSE PRACTITIONER

## 2023-04-03 PROCEDURE — 99214 OFFICE O/P EST MOD 30 MIN: CPT | Performed by: NURSE PRACTITIONER

## 2023-04-03 RX ORDER — ORAL SEMAGLUTIDE 3 MG/1
3 TABLET ORAL DAILY
Qty: 30 TABLET | Refills: 0 | Status: SHIPPED | OUTPATIENT
Start: 2023-04-03 | End: 2023-05-01 | Stop reason: SDUPTHER

## 2023-04-03 RX ORDER — FAMOTIDINE 20 MG/1
TABLET, FILM COATED ORAL
COMMUNITY
Start: 2023-03-25 | End: 2024-01-03 | Stop reason: SDUPTHER

## 2023-04-03 RX ORDER — FLUCONAZOLE 150 MG/1
150 TABLET ORAL DAILY
Qty: 2 TABLET | Refills: 0 | Status: SHIPPED | OUTPATIENT
Start: 2023-04-03 | End: 2023-07-03

## 2023-04-03 ASSESSMENT — ENCOUNTER SYMPTOMS
CHILLS: 0
BLURRED VISION: 0
DIZZINESS: 0
SHORTNESS OF BREATH: 0
PALPITATIONS: 0
BLOOD IN STOOL: 0
SENSORY CHANGE: 0
HEADACHES: 0
FEVER: 0
VOMITING: 0
ABDOMINAL PAIN: 0
DIARRHEA: 0
SORE THROAT: 1
NAUSEA: 0
SPUTUM PRODUCTION: 0
COUGH: 1
FLANK PAIN: 0
WHEEZING: 0

## 2023-04-03 ASSESSMENT — FIBROSIS 4 INDEX: FIB4 SCORE: 1.37

## 2023-04-03 NOTE — ASSESSMENT & PLAN NOTE
- Fluconazole 150 mg PO once today, may repeat in 48 hours for continuing symptoms. Symptoms consistent with vaginal candidiasis   - Avoid harsh soaps, detergents, vaginal products, or fragrances   - Education given regarding hyperglycemia and how it is a risk for vaginal infections. Also discussed is how a potential side effect of jardiance is vaginal infections related to the increased amount of glucose excreted in urine. Will treat for vaginal candidiasis today, and if any future vaginal infections occur we will discuss changing her diabetic medication   - Follow up in 1 month, or sooner for any worsening or additional symptoms

## 2023-04-03 NOTE — PROGRESS NOTES
Subjective:     Chief Complaint   Patient presents with    Vaginal Itching     Feels like it might be due to Jardiance     Pharyngitis     X1 month     Lab Results       HPI:   Bernadette presents today with     Problem   Vaginal Candidiasis    This is a new issue for this patient. Reports vaginal itching with moderate amounts of odorless white discharge x 1 week. She does not have urinary pain or changes in urination. No flank pain. She has not experienced these symptoms before, and is concerned it may be related to her use of jardiance as a side effect. Most recent HA1C increased, now 8.2 from 7.9. She denies any new soaps/detergents/products. She is not sexually active. She has been using hygienic wipes for relief of vaginal itching with moderate effect.      Viral Upper Respiratory Tract Infection    This is a new issue for this patient. Reports a sore throat, runny nose, and dry couch x 3 days. She was treated at urgent care approximately one month ago with amoxicillin for sinusitis. After completing antibiotic treatment all symptoms resolved completely. Beginning 3 days ago she developed a new sore throat. Positive sick contact of grandson diagnosed with a viral URI. Denies fevers. No ear pain or dizziness. No chest pain, difficulty breathing, or productive cough. Denies nausea, vomiting, or diarrhea. Throat pain is worse with swallowing and she has associated cervical lymphadenopathy.      Uncontrolled type 2 diabetes mellitus with hyperglycemia (HCC)    This is a chronic condition.  Current medications: metformin 1000 mg twice daily, glipizide 5 mg extended release daily, Jardiance 25 mg daily.  Last A1c: 8.2, patient states that significant increase in A1c is likely related to change in sugary foods in her diet over the last few months.  States that she has not been paying attention to diet.  Last Microalb/Cr ratio: up to date   Fasting sugars: Patient states fasting sugars consistently 110-130s   Last diabetic  foot exam: Monofilament testing up to date      Neuropathy s/s: denies   ACEi/ARB?  Losartan 100 mg   Statin?  Atorvastatin 40 mg  Concomitant HTN?  Positive hypertension, stable, blood pressure today is 120/74      Nightly foot checks? Yes   S/S with low bs? Yes     Chronic in nature. Most recent HA1C increased, now 8.2 from 7.9. She reports she takes all medication as prescribed. She states that her diet has been consisting of an increased amount of carbs and sweets recently related to her daughter visiting. Monofilament exam and microalbumin/Creatinine ratio up to date. Denies peripheral neuropathy or foot ulcers. Denies chest pain, palpitations, dizziness, increased urinary frequency or urgency, excessive thirst, or visual changes. Reports that she plans to make significant diet changes.        Htn (Hypertension)    Chronic in nature.  Stable.  Blood pressure today is 120/74.  Patient denies any chest pain, palpitations, dizziness, blurry vision.         Current Outpatient Medications Ordered in Epic   Medication Sig Dispense Refill    famotidine (PEPCID) 20 MG Tab       fluconazole (DIFLUCAN) 150 MG tablet Take 1 Tablet by mouth every day. Repeat in 48 hours if not resolved. 2 Tablet 0    Semaglutide (RYBELSUS) 3 MG Tab Take 3 mg by mouth every day. 30 Tablet 0    losartan (COZAAR) 100 MG Tab Take 1 Tablet by mouth every day. 90 Tablet 3    glipiZIDE SR (GLUCOTROL) 5 MG TABLET SR 24 HR Take 1 Tablet by mouth every day. 90 Tablet 3    metformin (GLUCOPHAGE) 1000 MG tablet Take 1 Tablet by mouth 2 times a day with meals. 180 Tablet 3    Dexlansoprazole (DEXILANT) 60 MG CAPSULE DELAYED RELEASE delayed-release capsule Take 60 mg by mouth every day. 30 Capsule 0    escitalopram (LEXAPRO) 10 MG Tab Take 1 Tablet by mouth every day. 90 Tablet 3    atorvastatin (LIPITOR) 40 MG Tab Take 1 Tablet by mouth every evening. 90 Tablet 3    levothyroxine (SYNTHROID) 75 MCG Tab Take 1 Tablet by mouth every morning on an empty  "stomach. 90 Tablet 3    Empagliflozin (JARDIANCE) 25 MG Tab Take 1 Tablet by mouth every day. 90 Tablet 3    metoprolol SR (TOPROL XL) 50 MG TABLET SR 24 HR Take 1 Tablet by mouth every day. 90 Tablet 3    Blood Glucose Monitoring Suppl Device Meter: Dispense Device of Insurance Preference. Sig. Use as directed for blood sugar monitoring. #1. NR. 1 Each 0    fluticasone (FLONASE) 50 MCG/ACT nasal spray SHAKE LIQUID AND USE 2 SPRAYS IN EACH NOSTRIL ONCE DAILY 16 g 2    ONETOUCH ULTRA strip USE  STRIP TO CHECK GLUCOSE ONCE DAILY BEFORE  BREAKFAST  AND  AS  NEEDED  FOR  SYMPTOMS  OF  HIGH  OR  LOW  BLOOD  SUGARS 150 Strip 1    glucose blood (ONE TOUCH ULTRA TEST) strip USE ONCE DAILY BEFORE BREAKFAST AND AS NEEDED FOR SYMPTOMS OF HIGH OR LOW BLOOS SUGAR 100 Strip 5    ONETOUCH DELICA LANCETS 33G Misc USE ONCE DAILY BEFORE BREAKFAST AND AS NEEDED FOR SYMPTOMS OF HIGH OR LOW BLOOD SUGAR 100 Each 5     No current Epic-ordered facility-administered medications on file.       Health Maintenance: To be completed next visit     Review of Systems   Constitutional:  Negative for chills and fever.   HENT:  Positive for congestion and sore throat. Negative for ear pain and tinnitus.    Eyes:  Negative for blurred vision.   Respiratory:  Positive for cough (dry). Negative for sputum production, shortness of breath and wheezing.    Cardiovascular:  Negative for chest pain, palpitations and leg swelling.   Gastrointestinal:  Negative for abdominal pain, blood in stool, diarrhea, nausea and vomiting.   Genitourinary:  Negative for dysuria, flank pain, frequency, hematuria and urgency.   Neurological:  Negative for dizziness, sensory change and headaches.       Objective:     Exam:  /74 (BP Location: Left arm, Patient Position: Sitting, BP Cuff Size: Small adult)   Pulse 78   Temp 36.9 °C (98.5 °F) (Temporal)   Ht 1.549 m (5' 1\")   Wt 66.3 kg (146 lb 3.2 oz)   LMP 11/01/2006   SpO2 94%   BMI 27.62 kg/m²  Body mass index " is 27.62 kg/m².    Physical Exam  Vitals reviewed.   Constitutional:       Appearance: Normal appearance.   HENT:      Right Ear: Tympanic membrane, ear canal and external ear normal.      Left Ear: Tympanic membrane, ear canal and external ear normal.      Mouth/Throat:      Mouth: Mucous membranes are moist.      Pharynx: Posterior oropharyngeal erythema present. No oropharyngeal exudate.   Eyes:      Extraocular Movements: Extraocular movements intact.      Pupils: Pupils are equal, round, and reactive to light.   Cardiovascular:      Rate and Rhythm: Normal rate and regular rhythm.      Pulses: Normal pulses.      Heart sounds: Normal heart sounds.   Pulmonary:      Effort: Pulmonary effort is normal.      Breath sounds: Normal breath sounds.   Lymphadenopathy:      Cervical: Cervical adenopathy present.   Skin:     General: Skin is warm and dry.   Neurological:      Mental Status: She is alert and oriented to person, place, and time.   Psychiatric:         Mood and Affect: Mood normal.         Behavior: Behavior normal.         Thought Content: Thought content normal.     Assessment & Plan:     72 y.o. female with the following -     Problem List Items Addressed This Visit       HTN (hypertension)     - Continue losartan 100 mg daily, metoprolol 50 mg daily          Uncontrolled type 2 diabetes mellitus with hyperglycemia (HCC)     - Begin semaglutide 3 mg PO daily. Continue metformin 1000 mg BID, empagliflozin 25 mg daily, glipizide 5 mg extended release daily   - Referral to pharmacotherapy for DM management   - Follow up in 1 month to assess response to medication, or sooner if dizziness, palpitations, blurred vision, changes in urination, or chest pain present   - Repeat HA1C, CMP in 3 months          Relevant Medications    Semaglutide (RYBELSUS) 3 MG Tab    Other Relevant Orders    HEMOGLOBIN A1C    Referral to Pharmacotherapy Service    Comp Metabolic Panel    Vaginal candidiasis     - Fluconazole 150 mg  PO once today, may repeat in 48 hours for continuing symptoms. Symptoms consistent with vaginal candidiasis   - Avoid harsh soaps, detergents, vaginal products, or fragrances   - Education given regarding hyperglycemia and how it is a risk for vaginal infections. Also discussed is how a potential side effect of jardiance is vaginal infections related to the increased amount of glucose excreted in urine. Will treat for vaginal candidiasis today, and if any future vaginal infections occur we will discuss changing her diabetic medication   - Follow up in 1 month, or sooner for any worsening or additional symptoms          Viral upper respiratory tract infection     - Influenza PCR, RSV PCR, Covid PCR (all negative)   - POC strep (not detected)   - Symptom presentation and physical exam consistent with viral URI, supportive treatment recommended. Perform nasal saline rinses, increase PO fluid intake   - Follow up if symptoms worsen, or if fever, dizziness, worse cough, or difficulty breathing occur             Other Visit Diagnoses       Sore throat        Relevant Orders    POCT Cepheid Group A Strep - PCR (Completed)    Acute URI        Relevant Orders    POCT Cepheid CoV-2, Flu A/B, RSV - PCR (Completed)    Swelling of tonsil        Yeast vaginitis        Relevant Medications    fluconazole (DIFLUCAN) 150 MG tablet            Return in about 1 month (around 5/3/2023), or if symptoms worsen or fail to improve, for DM.      Please note that this dictation was created using voice recognition software. I have made every reasonable attempt to correct obvious errors, but I expect that there are errors of grammar and possibly content that I did not discover before finalizing the note.

## 2023-04-03 NOTE — ASSESSMENT & PLAN NOTE
- Begin semaglutide 3 mg PO daily. Continue metformin 1000 mg BID, empagliflozin 25 mg daily, glipizide 5 mg extended release daily   - Referral to pharmacotherapy for DM management   - Follow up in 1 month to assess response to medication, or sooner if dizziness, palpitations, blurred vision, changes in urination, or chest pain present   - Repeat HA1C, CMP in 3 months

## 2023-04-03 NOTE — ASSESSMENT & PLAN NOTE
- Influenza PCR, RSV PCR, Covid PCR (all negative)   - POC strep (not detected)   - Symptom presentation and physical exam consistent with viral URI, supportive treatment recommended. Perform nasal saline rinses, increase PO fluid intake   - Follow up if symptoms worsen, or if fever, dizziness, worse cough, or difficulty breathing occur

## 2023-04-04 ENCOUNTER — TELEPHONE (OUTPATIENT)
Dept: VASCULAR LAB | Facility: MEDICAL CENTER | Age: 73
End: 2023-04-04
Payer: COMMERCIAL

## 2023-04-04 NOTE — TELEPHONE ENCOUNTER
[facility]    Received DM referral from GERALDO Escalera on 4/3    1st call  S/w pt - she will call back to establish care    Insurance: [place] [employer]vantage - has been seeing [facility]CP  PCP: [facility]  Locations to be seen: [facility] at [phone], fax [phone]    Gianna Gagnon, PharmD

## 2023-04-12 ENCOUNTER — TELEPHONE (OUTPATIENT)
Dept: VASCULAR LAB | Facility: MEDICAL CENTER | Age: 73
End: 2023-04-12
Payer: COMMERCIAL

## 2023-04-26 ENCOUNTER — DOCUMENTATION (OUTPATIENT)
Dept: VASCULAR LAB | Facility: MEDICAL CENTER | Age: 73
End: 2023-04-26
Payer: COMMERCIAL

## 2023-04-26 NOTE — PROGRESS NOTES
[facility]    Received diabetes referral from GERALDO Escalera on 4-3-23    3rd attempt.  Attempted to reach patient regarding referral, no answer to call, no VM set up.  [employer] message sent    Insurance: [employer] plan - does see [facility]CP.  PCP: [facility]  Locations to be seen: Any    [facility] at [phone], fax [phone]    Nadir Fitch, PharmD, BCACP

## 2023-05-01 ENCOUNTER — TELEPHONE (OUTPATIENT)
Dept: MEDICAL GROUP | Facility: PHYSICIAN GROUP | Age: 73
End: 2023-05-01
Payer: COMMERCIAL

## 2023-05-01 DIAGNOSIS — E11.65 UNCONTROLLED TYPE 2 DIABETES MELLITUS WITH HYPERGLYCEMIA (HCC): ICD-10-CM

## 2023-05-01 RX ORDER — ORAL SEMAGLUTIDE 3 MG/1
3 TABLET ORAL DAILY
Qty: 90 TABLET | Refills: 0 | Status: SHIPPED | OUTPATIENT
Start: 2023-05-01 | End: 2023-06-06

## 2023-05-01 NOTE — TELEPHONE ENCOUNTER
Pt is here because she thought her appt was for today and it was rescheduled till next Monday. Pt is out of her medication of  Rybelsios 3 mg immediately. The rest of her meds can wait till next week when she comes in.Please c all patient with any questions. Thank you

## 2023-05-01 NOTE — TELEPHONE ENCOUNTER
Looks like both the appointments were canceled and her next appointment is in July?  Does she want to be scheduled to be seen in May?    I did refill the Rybelsus 3 mg.

## 2023-05-02 RX ORDER — FLUTICASONE PROPIONATE 50 MCG
SPRAY, SUSPENSION (ML) NASAL
Qty: 16 G | Refills: 0 | Status: SHIPPED | OUTPATIENT
Start: 2023-05-02 | End: 2023-05-02 | Stop reason: SDUPTHER

## 2023-05-02 RX ORDER — FLUTICASONE PROPIONATE 50 MCG
SPRAY, SUSPENSION (ML) NASAL
Qty: 16 G | Refills: 0 | Status: SHIPPED | OUTPATIENT
Start: 2023-05-02 | End: 2023-09-26

## 2023-05-02 NOTE — TELEPHONE ENCOUNTER
Phone Number Called: [phone] (home)       Call outcome:  voicemail not set up    Message: First attempt will call later

## 2023-05-03 ENCOUNTER — DOCUMENTATION (OUTPATIENT)
Dept: VASCULAR LAB | Facility: MEDICAL CENTER | Age: 73
End: 2023-05-03
Payer: COMMERCIAL

## 2023-05-03 NOTE — TELEPHONE ENCOUNTER
Phone Number Called: : [phone]    Call outcome: Spoke to patient regarding message below.    Message: Spoke to pt and informed her RYBELSUS 3mg was sent over to her pharmacy as requested pt stated she did not want to schedule appt for May she will leave her appt for 7/10/23.      [id number] Exp Problem Focused - Mod. Complex

## 2023-05-03 NOTE — PROGRESS NOTES
[facility]    Received diabetes referral from GERALDO Escalera on 4-3-23.    4th call  LM with patient to c/b to establish care.  [employer] message sent X 2.  Letter sent.    Will await further contact from patient at this time.      [facility] at [phone], fax [phone]    Nadir Fitch, PharmD, BCACP

## 2023-05-31 ENCOUNTER — TELEPHONE (OUTPATIENT)
Dept: MEDICAL GROUP | Facility: PHYSICIAN GROUP | Age: 73
End: 2023-05-31
Payer: COMMERCIAL

## 2023-05-31 NOTE — TELEPHONE ENCOUNTER
DOCUMENTATION OF PAR STATUS:    1. Name of Medication & Dose:  Semaglutide (RYBELSUS) 3 MG Tab    2. Name of Prescription Coverage Company & phone #: [employer]    3. Date Prior Auth Submitted: 5/31/23    4. What information was given to obtain insurance decision? [id number] code    5. Prior Auth Status? Pending Key: [id number]    6. Patient Notified: no

## 2023-06-01 ENCOUNTER — TELEPHONE (OUTPATIENT)
Dept: MEDICAL GROUP | Facility: PHYSICIAN GROUP | Age: 73
End: 2023-06-01
Payer: COMMERCIAL

## 2023-06-01 NOTE — TELEPHONE ENCOUNTER
FINAL PRIOR AUTHORIZATION STATUS:    1.  Name of Medication & Dose: RYBELSUS 3MG TAB      2. Prior Auth Status: Denied.  Reason: SCANNED INTO MEDIA     3. Action Taken: Pharmacy Notified: N\A Patient Notified: N\A

## 2023-06-01 NOTE — LETTER
June 1, 2023        Bernadette Freitas  [address] [place] [id number]      To whom it may concern:    Requesting a 365-day supply of Rybelsus 3 mg as an exception to this 60 tablet per 365-day.  Maricel has uncontrolled diabetes with an A1c of 8.2, she is currently on the maximum dose of glipizide, metformin, Jardiance, and is working on diet changes.  Please approve continued access to medication based on an adequate response to multiple therapies.    If you have any questions or concerns, please don't hesitate to call.        Sincerely,        Harley Dalton, A.P.R.N.    Electronically Signed

## 2023-06-02 ENCOUNTER — TELEPHONE (OUTPATIENT)
Dept: MEDICAL GROUP | Facility: PHYSICIAN GROUP | Age: 73
End: 2023-06-02
Payer: COMMERCIAL

## 2023-06-02 NOTE — TELEPHONE ENCOUNTER
VOICEMAIL  1. Caller Name: Bernadette Freitas                        Call Back Number: [phone] (home)       2. Message: Pt left vm stating that the appeal process is taking to long and would like to know if there is an alternative medication she can take now until the appeal is done. Please advise.    3. Patient approves office to leave a detailed voicemail/MyChart message: yes

## 2023-06-02 NOTE — PROGRESS NOTES
Phone Number Called: [phone]    Call outcome:  Spoke to Ras Vega    Message: Spoke to representative and was given the fax number: [phone] faxed over letter for appeal done by pcp. Scanned in media.

## 2023-06-02 NOTE — PROGRESS NOTES
Letter for appeal of prior authorization in [employer] provider to [place].  Please send to number indicated on denial in media

## 2023-06-04 ENCOUNTER — OFFICE VISIT (OUTPATIENT)
Dept: URGENT CARE | Facility: PHYSICIAN GROUP | Age: 73
End: 2023-06-04
Payer: COMMERCIAL

## 2023-06-04 VITALS
TEMPERATURE: 97.5 F | SYSTOLIC BLOOD PRESSURE: 140 MMHG | HEIGHT: 61 IN | HEART RATE: 76 BPM | RESPIRATION RATE: 12 BRPM | WEIGHT: 150 LBS | OXYGEN SATURATION: 96 % | BODY MASS INDEX: 28.32 KG/M2 | DIASTOLIC BLOOD PRESSURE: 74 MMHG

## 2023-06-04 DIAGNOSIS — E11.65 UNCONTROLLED TYPE 2 DIABETES MELLITUS WITH HYPERGLYCEMIA (HCC): ICD-10-CM

## 2023-06-04 LAB — GLUCOSE BLD-MCNC: 85 MG/DL (ref 65–99)

## 2023-06-04 PROCEDURE — 3078F DIAST BP <80 MM HG: CPT | Performed by: PHYSICIAN ASSISTANT

## 2023-06-04 PROCEDURE — 3077F SYST BP >= 140 MM HG: CPT | Performed by: PHYSICIAN ASSISTANT

## 2023-06-04 PROCEDURE — 82962 GLUCOSE BLOOD TEST: CPT | Performed by: PHYSICIAN ASSISTANT

## 2023-06-04 PROCEDURE — 99214 OFFICE O/P EST MOD 30 MIN: CPT | Performed by: PHYSICIAN ASSISTANT

## 2023-06-04 RX ORDER — PANTOPRAZOLE SODIUM 40 MG/1
40 TABLET, DELAYED RELEASE ORAL 2 TIMES DAILY
COMMUNITY
Start: 2023-04-03 | End: 2023-12-26 | Stop reason: SDUPTHER

## 2023-06-04 ASSESSMENT — FIBROSIS 4 INDEX: FIB4 SCORE: 1.37

## 2023-06-04 ASSESSMENT — ENCOUNTER SYMPTOMS
CHILLS: 0
HEADACHES: 0
SHORTNESS OF BREATH: 0
DIARRHEA: 0
FEVER: 0
ABDOMINAL PAIN: 0

## 2023-06-04 NOTE — PROGRESS NOTES
Subjective     Bernadette Freitas is a 72 y.o. female who presents with High Blood Sugar (Pt concern for blood sugar, pt normally takes rybelsus but was wondering if she can take another med instead of rybelsus, onset yesterday)      HPI:  Bernadette Freitas is a 72 y.o. female who presents today with concerns of high blood sugar.  Patient is a known diabetic.  A1c increased from 7.9% on 2/7/2020 23 to 8.2% on 3/30/2023.  At primary care visit on 4/3 it was noted that patient was taking metformin 1000 mg twice daily, glipizide 5 mg extended release daily, Jardiance 25 mg daily.  She was also noting that she was not paying close attention to her diet and was eating a lot of sugary foods.  Provider added on Rybelsus 3 mg tab daily.  Patient notes that her insurance covered only a 60-day supply of the Rybelsus and she took her last dose yesterday.  She is concerned about her blood sugars now that she is off the medication.  Patient last sent a message to her primary care provider on Friday asking if there was another medication she could take until she is able to restart the Rybelsus.  Provider has not yet responded.        Review of Systems   Constitutional:  Negative for chills and fever.   Respiratory:  Negative for shortness of breath.    Cardiovascular:  Negative for chest pain.   Gastrointestinal:  Negative for abdominal pain and diarrhea.   Neurological:  Negative for headaches.           PMH:  has a past medical history of Depression, Diabetes (HCC), Dyslipidemia, HTN (hypertension), Hypercholesteremia, Hypothyroidism, Impaired fasting glucose, and Vaginal candidiasis (4/3/2023).    She has no past medical history of CAD (coronary artery disease), Liver disease, or Seizure disorder ([facility]).  MEDS:   Current Outpatient Medications:     pantoprazole (PROTONIX) 40 MG Tablet Delayed Response, Take 40 mg by mouth 2 times a day., Disp: , Rfl:     fluticasone (FLONASE) 50 MCG/ACT nasal spray, SHAKE LIQUID AND USE 2  SPRAYS IN EACH NOSTRIL ONCE DAILY, Disp: 16 g, Rfl: 0    Semaglutide (RYBELSUS) 3 MG Tab, Take 3 mg by mouth every day., Disp: 90 Tablet, Rfl: 0    famotidine (PEPCID) 20 MG Tab, , Disp: , Rfl:     fluconazole (DIFLUCAN) 150 MG tablet, Take 1 Tablet by mouth every day. Repeat in 48 hours if not resolved., Disp: 2 Tablet, Rfl: 0    losartan (COZAAR) 100 MG Tab, Take 1 Tablet by mouth every day., Disp: 90 Tablet, Rfl: 3    glipiZIDE SR (GLUCOTROL) 5 MG TABLET SR 24 HR, Take 1 Tablet by mouth every day., Disp: 90 Tablet, Rfl: 3    metformin (GLUCOPHAGE) 1000 MG tablet, Take 1 Tablet by mouth 2 times a day with meals., Disp: 180 Tablet, Rfl: 3    Dexlansoprazole (DEXILANT) 60 MG CAPSULE DELAYED RELEASE delayed-release capsule, Take 60 mg by mouth every day., Disp: 30 Capsule, Rfl: 0    escitalopram (LEXAPRO) 10 MG Tab, Take 1 Tablet by mouth every day., Disp: 90 Tablet, Rfl: 3    atorvastatin (LIPITOR) 40 MG Tab, Take 1 Tablet by mouth every evening., Disp: 90 Tablet, Rfl: 3    levothyroxine (SYNTHROID) 75 MCG Tab, Take 1 Tablet by mouth every morning on an empty stomach., Disp: 90 Tablet, Rfl: 3    Empagliflozin (JARDIANCE) 25 MG Tab, Take 1 Tablet by mouth every day., Disp: 90 Tablet, Rfl: 3    metoprolol SR (TOPROL XL) 50 MG TABLET SR 24 HR, Take 1 Tablet by mouth every day., Disp: 90 Tablet, Rfl: 3    Blood Glucose Monitoring Suppl Device, Meter: Dispense Device of Insurance Preference. Sig. Use as directed for blood sugar monitoring. #1. NR., Disp: 1 Each, Rfl: 0    ONETOUCH ULTRA strip, USE  STRIP TO CHECK GLUCOSE ONCE DAILY BEFORE  BREAKFAST  AND  AS  NEEDED  FOR  SYMPTOMS  OF  HIGH  OR  LOW  BLOOD  SUGARS, Disp: 150 Strip, Rfl: 1    glucose blood (ONE TOUCH ULTRA TEST) strip, USE ONCE DAILY BEFORE BREAKFAST AND AS NEEDED FOR SYMPTOMS OF HIGH OR LOW BLOOS SUGAR, Disp: 100 Strip, Rfl: 5    ONETOUCH DELICA LANCETS 33G Misc, USE ONCE DAILY BEFORE BREAKFAST AND AS NEEDED FOR SYMPTOMS OF HIGH OR LOW BLOOD SUGAR, Disp:  "100 Each, Rfl: 5  ALLERGIES:   Allergies   Allergen Reactions    Penicillin G Unspecified     Can tolerate Augmentin     SURGHX:   Past Surgical History:   Procedure Laterality Date    COLONOSCOPY WITH POLYP  10/30/2017    polyp asc colon- adenoma, diverticulosis sigmoid and dec colon,int hemorhoids    COLONOSCOPY WITH POLYP  11/19/2013    2 polyps-benign polypoid and adenomatous,severe diverticulosis entire colon, medium external hemorrhoids,    BREAST BIOPSY  3/13    left breast-benign    COLONOSCOPY  2008    polyp- tubular adenoma - [facility]    GASTROSCOPY  2008    acid reflux    CATARACT EXTRACTION WITH IOL Bilateral 03/03/2020, 5/4/2020    PRIMARY C SECTION      x1    THYROIDECTOMY TOTAL      toxic goiter     SOCHX:  reports that she quit smoking about 24 years ago. Her smoking use included cigarettes. She has never used smokeless tobacco. She reports current alcohol use. She reports that she does not use drugs.  FH: Family history was reviewed, no pertinent findings to report      Objective     BP (!) 140/74 (BP Location: Right arm, Patient Position: Sitting, BP Cuff Size: Adult)   Pulse 76   Temp 36.4 °C (97.5 °F) (Temporal)   Resp 12   Ht 1.549 m (5' 1\")   Wt 68 kg (150 lb)   LMP 11/01/2006   SpO2 96%   BMI 28.34 kg/m²      Physical Exam  Constitutional:       General: She is not in acute distress.     Appearance: She is not diaphoretic.   HENT:      Head: Normocephalic and atraumatic.      Right Ear: External ear normal.      Left Ear: External ear normal.   Eyes:      Conjunctiva/sclera: Conjunctivae normal.      Pupils: Pupils are equal, round, and reactive to light.   Pulmonary:      Effort: Pulmonary effort is normal. No respiratory distress.   Musculoskeletal:      Cervical back: Normal range of motion.   Skin:     Findings: No rash.   Neurological:      Mental Status: She is alert and oriented to person, place, and time.   Psychiatric:         Mood and Affect: Mood and affect normal.    "      Cognition and Memory: Memory normal.         Judgment: Judgment normal.       POCT Glucose - 85    Assessment & Plan     1. Uncontrolled type 2 diabetes mellitus with hyperglycemia (HCC)  - POCT Glucose  Patient concerned about elevated blood sugars.  POCT glucose in the urgent care is 85.  Patient took her last dose of Rybelsus yesterday.  There is a message in [employer] to her primary care provider.  Discussed that patient should give her PCP 24 to 48 hours to respond.  I do not feel the need to make any changes to her medication regimen from the urgent care setting.  Discussed with patient that she should monitor her sugars closely and watch her diet.            Differential Diagnosis, natural history, and supportive care discussed. Return to the Urgent Care or follow up with your PCP if symptoms fail to resolve, or for any new or worsening symptoms. Emergency room precautions discussed. Patient and/or family appears understanding of information.

## 2023-06-06 ENCOUNTER — TELEPHONE (OUTPATIENT)
Dept: MEDICAL GROUP | Facility: PHYSICIAN GROUP | Age: 73
End: 2023-06-06
Payer: COMMERCIAL

## 2023-06-06 DIAGNOSIS — E11.65 UNCONTROLLED TYPE 2 DIABETES MELLITUS WITH HYPERGLYCEMIA (HCC): ICD-10-CM

## 2023-06-06 RX ORDER — ORAL SEMAGLUTIDE 7 MG/1
7 TABLET ORAL DAILY
Qty: 30 TABLET | Refills: 0 | Status: SHIPPED | OUTPATIENT
Start: 2023-06-06 | End: 2023-07-03

## 2023-06-06 NOTE — TELEPHONE ENCOUNTER
Patient did report some nausea on the 3 mg dose, and were concerned with increasing the dose but I will send an increased dose of 7 mg by mouth daily to the pharmacy if her insurance will cover that.  These contact the patient and let her know that we have sent Rybelsus to 7 mg by mouth daily to the pharmacy and that we are hoping her insurance will cover the change of dose.

## 2023-06-06 NOTE — TELEPHONE ENCOUNTER
Pt insurance called and wanted to know why provider wants to keep pt at 3mg of rybelsus instead of the recommended dosage by FDA to move dosage to 7mg after 30 days.

## 2023-06-15 NOTE — TELEPHONE ENCOUNTER
FINAL PRIOR AUTHORIZATION STATUS:    1.  Name of Medication & Dose:  Semaglutide (RYBELSUS) 3 MG Tab    2. Prior Auth Status: Denied.  Reason: N/A    3. Action Taken: Pharmacy Notified: no Patient Notified: no

## 2023-07-01 ENCOUNTER — HOSPITAL ENCOUNTER (OUTPATIENT)
Dept: LAB | Facility: MEDICAL CENTER | Age: 73
End: 2023-07-01
Attending: NURSE PRACTITIONER
Payer: COMMERCIAL

## 2023-07-01 DIAGNOSIS — E11.65 UNCONTROLLED TYPE 2 DIABETES MELLITUS WITH HYPERGLYCEMIA (HCC): ICD-10-CM

## 2023-07-01 LAB
ALBUMIN SERPL BCP-MCNC: 4.4 G/DL (ref 3.2–4.9)
ALBUMIN/GLOB SERPL: 1.8 G/DL
ALP SERPL-CCNC: 47 U/L (ref 30–99)
ALT SERPL-CCNC: 29 U/L (ref 2–50)
ANION GAP SERPL CALC-SCNC: 12 MMOL/L (ref 7–16)
AST SERPL-CCNC: 25 U/L (ref 12–45)
BILIRUB SERPL-MCNC: 0.5 MG/DL (ref 0.1–1.5)
BUN SERPL-MCNC: 10 MG/DL (ref 8–22)
CALCIUM ALBUM COR SERPL-MCNC: 9.2 MG/DL (ref 8.5–10.5)
CALCIUM SERPL-MCNC: 9.5 MG/DL (ref 8.5–10.5)
CHLORIDE SERPL-SCNC: 104 MMOL/L (ref 96–112)
CO2 SERPL-SCNC: 25 MMOL/L (ref 20–33)
CREAT SERPL-MCNC: 0.82 MG/DL (ref 0.5–1.4)
EST. AVERAGE GLUCOSE BLD GHB EST-MCNC: 166 MG/DL
GFR SERPLBLD CREATININE-BSD FMLA CKD-EPI: 76 ML/MIN/1.73 M 2
GLOBULIN SER CALC-MCNC: 2.4 G/DL (ref 1.9–3.5)
GLUCOSE SERPL-MCNC: 142 MG/DL (ref 65–99)
HBA1C MFR BLD: 7.4 % (ref 4–5.6)
POTASSIUM SERPL-SCNC: 4.5 MMOL/L (ref 3.6–5.5)
PROT SERPL-MCNC: 6.8 G/DL (ref 6–8.2)
SODIUM SERPL-SCNC: 141 MMOL/L (ref 135–145)

## 2023-07-01 PROCEDURE — 80053 COMPREHEN METABOLIC PANEL: CPT

## 2023-07-01 PROCEDURE — 83036 HEMOGLOBIN GLYCOSYLATED A1C: CPT

## 2023-07-01 PROCEDURE — 36415 COLL VENOUS BLD VENIPUNCTURE: CPT

## 2023-07-03 ENCOUNTER — OFFICE VISIT (OUTPATIENT)
Dept: MEDICAL GROUP | Facility: PHYSICIAN GROUP | Age: 73
End: 2023-07-03
Payer: COMMERCIAL

## 2023-07-03 VITALS
DIASTOLIC BLOOD PRESSURE: 84 MMHG | BODY MASS INDEX: 28.32 KG/M2 | SYSTOLIC BLOOD PRESSURE: 144 MMHG | TEMPERATURE: 97.7 F | RESPIRATION RATE: 14 BRPM | HEART RATE: 89 BPM | HEIGHT: 61 IN | WEIGHT: 150 LBS | OXYGEN SATURATION: 94 %

## 2023-07-03 DIAGNOSIS — E11.65 UNCONTROLLED TYPE 2 DIABETES MELLITUS WITH HYPERGLYCEMIA (HCC): ICD-10-CM

## 2023-07-03 DIAGNOSIS — E78.5 DYSLIPIDEMIA: ICD-10-CM

## 2023-07-03 DIAGNOSIS — B35.1 ONYCHOMYCOSIS: ICD-10-CM

## 2023-07-03 DIAGNOSIS — F33.9 EPISODE OF RECURRENT MAJOR DEPRESSIVE DISORDER, UNSPECIFIED DEPRESSION EPISODE SEVERITY (HCC): ICD-10-CM

## 2023-07-03 DIAGNOSIS — E03.9 HYPOTHYROIDISM, UNSPECIFIED TYPE: ICD-10-CM

## 2023-07-03 DIAGNOSIS — K21.9 GASTROESOPHAGEAL REFLUX DISEASE, UNSPECIFIED WHETHER ESOPHAGITIS PRESENT: ICD-10-CM

## 2023-07-03 DIAGNOSIS — I10 PRIMARY HYPERTENSION: ICD-10-CM

## 2023-07-03 PROCEDURE — 99214 OFFICE O/P EST MOD 30 MIN: CPT | Performed by: FAMILY MEDICINE

## 2023-07-03 PROCEDURE — 3079F DIAST BP 80-89 MM HG: CPT | Performed by: FAMILY MEDICINE

## 2023-07-03 PROCEDURE — 3077F SYST BP >= 140 MM HG: CPT | Performed by: FAMILY MEDICINE

## 2023-07-03 RX ORDER — CICLOPIROX 80 MG/ML
1 SOLUTION TOPICAL NIGHTLY
Qty: 6 ML | Refills: 3 | Status: SHIPPED | OUTPATIENT
Start: 2023-07-03

## 2023-07-03 RX ORDER — HYDROCHLOROTHIAZIDE 12.5 MG/1
12.5 TABLET ORAL DAILY
Qty: 90 TABLET | Refills: 3 | Status: SHIPPED | OUTPATIENT
Start: 2023-07-03 | End: 2023-07-11 | Stop reason: SINTOL

## 2023-07-03 ASSESSMENT — FIBROSIS 4 INDEX: FIB4 SCORE: 1.29

## 2023-07-03 NOTE — PROGRESS NOTES
"CHIEF COMPLAINT / REASON FOR VISIT  Bernadette Freitas is a 72 y.o. female that presents today to establish care.    HISTORY OF PRESENT ILLNESS  Requesting lab work every 4 months    Discontinued Jardiance due to vaginal yeast infection. Discontinued semaglutide (Rybelsus) due to cost and low blood glucoses.    Checks blood glucose in morning, sometimes twice per day, is requesting continuous glucose monitor.    Social History     Tobacco Use    Smoking status: Former     Packs/day: 0.00     Types: Cigarettes     Quit date: 1998     Years since quittin.6    Smokeless tobacco: Never    Tobacco comments:     quit , smoked 1-2 cig/day for 5 yrs   Vaping Use    Vaping Use: Never used   Substance Use Topics    Alcohol use: Yes     Alcohol/week: 0.0 oz     Comment: rare    Drug use: No       OBJECTIVE    BP (!) 144/84 (BP Location: Right arm, Patient Position: Sitting, BP Cuff Size: Adult)   Pulse 89   Temp 36.5 °C (97.7 °F) (Temporal)   Resp 14   Ht 1.549 m (5' 1\")   Wt 68 kg (150 lb)   LMP 2006   SpO2 94%   BMI 28.34 kg/m²  Body mass index is 28.34 kg/m².    PHYSICAL EXAM  Constitutional: Sitting comfortably, in no acute distress, responds to questions appropriately.  Head: Normocephalic  Eyes:  No conjunctival injection, no scleral icterus, PERRL  Mouth: Oral mucosa moist  Throat: Oropharynx clear without erythema or tonsillar exudates  Neck: No cervical lymphadenopathy  Heart: Regular S1 S2, no murmurs, rub, or gallops  Lungs: Clear to auscultation bilaterally, no wheezes, rales, or rhonchi  Extremities: No lower extremity edema  Skin: Right great toenail hypertrophic with mild dystrophy, slightly yellow discoloration    ASSESSMENT & PLAN  1. Uncontrolled type 2 diabetes mellitus with hyperglycemia (HCC)  This is a chronic condition.  Current medications: metformin 1000 mg twice daily, glipizide 5 mg extended release daily  Last A1c: 7.4 (from 8.2)  Last Microalb/Cr ratio: up to date (no " albuminuria)  Fasting sugars: Patient states fasting sugars consistently 110-130s   Last diabetic foot exam: Monofilament testing up to date      Neuropathy s/s: denies   ACEi/ARB?  Losartan 100 mg   Statin?  Atorvastatin 40 mg  Concomitant HTN?  Yes  Nightly foot checks? Yes   S/S with low bs? Yes     A1c decreased to 7.4 from 8.2 previously with lifestyle changes.  She will continue trying to work on diet.  Did not tolerate Jardiance due to vaginal yeast infection, and oral semaglutide was too expensive.  She also reports that she was getting low blood sugars with oral semaglutide, though I suspect this was actually due to the glipizide.  She will continue to work on diet and exercise and we will recheck in 4 months.  If A1c is still above goal of less than 7 then would need to intensify her regimen, considering injectable GLP-1 agonist  - Comp Metabolic Panel; Future  - HEMOGLOBIN A1C; Future  - Continuous Blood Gluc Sensor (FREESTYLE MURIEL 2 SENSOR) Misc; Freestyle muriel sensor. Check blood glucose 1-2 times per day  Dispense: 1 Each; Refill: 3  - Continuous Blood Gluc  (FREESTYLE MURIEL 2 READER) Device; Freestyle muriel reader. Check blood glucose 1-2 times per day.  Dispense: 1 Each; Refill: 3    2. Hypothyroidism, unspecified type  Chronic postsurgical hypothyroidism.  Last TSH within normal range on levothyroxine 75 mcg daily.  - TSH WITH REFLEX TO FT4; Future    3. Gastroesophageal reflux disease, unspecified whether esophagitis present  Chronic, symptoms controlled with pantoprazole 40 mg twice daily.  We will need to revisit this due to high-dose PPI    4. Primary hypertension  Uncontrolled, we will switch her metoprolol succinate to hydrochlorothiazide 12.5 mg daily.  Continue losartan 100 mg daily.  - Comp Metabolic Panel; Future  - hydroCHLOROthiazide (HYDRODIURIL) 12.5 MG tablet; Take 1 Tablet by mouth every day.  Dispense: 90 Tablet; Refill: 3    5. Dyslipidemia  Chronic, on atorvastatin 40 mg  daily.  Last LDL-C 86, still with residual hypertriglyceridemia of 183  - Lipid Profile; Future    6. Episode of recurrent major depressive disorder, unspecified depression episode severity (HCC)  Chronic, stable on Lexapro 10 mg daily.    7. Onychomycosis  Prefers not to start oral medication, requesting topical treatment.  - ciclopirox (PENLAC) 8 % solution; Apply 1 Application topically every evening. Use rubbing alcohol to remove coat prior to re-applying  Dispense: 6 mL; Refill: 3    Follow-up diabetes visit in 4 months

## 2023-07-11 DIAGNOSIS — I10 PRIMARY HYPERTENSION: ICD-10-CM

## 2023-07-11 RX ORDER — AMLODIPINE BESYLATE 5 MG/1
5 TABLET ORAL DAILY
Qty: 90 TABLET | Refills: 3 | Status: SHIPPED | OUTPATIENT
Start: 2023-07-11 | End: 2023-07-12

## 2023-07-12 RX ORDER — CARVEDILOL 6.25 MG/1
6.25 TABLET ORAL 2 TIMES DAILY WITH MEALS
Qty: 60 TABLET | Refills: 3 | Status: SHIPPED | OUTPATIENT
Start: 2023-07-12 | End: 2023-07-31 | Stop reason: SDUPTHER

## 2023-07-25 ENCOUNTER — APPOINTMENT (OUTPATIENT)
Dept: RADIOLOGY | Facility: MEDICAL CENTER | Age: 73
End: 2023-07-25
Attending: EMERGENCY MEDICINE
Payer: COMMERCIAL

## 2023-07-25 ENCOUNTER — OFFICE VISIT (OUTPATIENT)
Dept: URGENT CARE | Facility: PHYSICIAN GROUP | Age: 73
End: 2023-07-25
Payer: COMMERCIAL

## 2023-07-25 ENCOUNTER — HOSPITAL ENCOUNTER (EMERGENCY)
Facility: MEDICAL CENTER | Age: 73
End: 2023-07-25
Attending: EMERGENCY MEDICINE
Payer: COMMERCIAL

## 2023-07-25 VITALS
SYSTOLIC BLOOD PRESSURE: 130 MMHG | TEMPERATURE: 97.4 F | RESPIRATION RATE: 14 BRPM | HEART RATE: 62 BPM | WEIGHT: 150 LBS | OXYGEN SATURATION: 98 % | HEIGHT: 61 IN | DIASTOLIC BLOOD PRESSURE: 70 MMHG | BODY MASS INDEX: 28.32 KG/M2

## 2023-07-25 VITALS
WEIGHT: 149.91 LBS | BODY MASS INDEX: 28.3 KG/M2 | DIASTOLIC BLOOD PRESSURE: 83 MMHG | HEART RATE: 61 BPM | RESPIRATION RATE: 18 BRPM | TEMPERATURE: 98 F | SYSTOLIC BLOOD PRESSURE: 151 MMHG | OXYGEN SATURATION: 95 % | HEIGHT: 61 IN

## 2023-07-25 DIAGNOSIS — M25.551 RIGHT HIP PAIN: ICD-10-CM

## 2023-07-25 DIAGNOSIS — R10.31 RIGHT LOWER QUADRANT PAIN: ICD-10-CM

## 2023-07-25 DIAGNOSIS — M25.551 HIP PAIN, ACUTE, RIGHT: ICD-10-CM

## 2023-07-25 LAB
ALBUMIN SERPL BCP-MCNC: 4.4 G/DL (ref 3.2–4.9)
ALBUMIN/GLOB SERPL: 1.8 G/DL
ALP SERPL-CCNC: 49 U/L (ref 30–99)
ALT SERPL-CCNC: 36 U/L (ref 2–50)
ANION GAP SERPL CALC-SCNC: 9 MMOL/L (ref 7–16)
APPEARANCE UR: CLEAR
APPEARANCE UR: NORMAL
AST SERPL-CCNC: 27 U/L (ref 12–45)
BASOPHILS # BLD AUTO: 0.7 % (ref 0–1.8)
BASOPHILS # BLD: 0.05 K/UL (ref 0–0.12)
BILIRUB SERPL-MCNC: 0.2 MG/DL (ref 0.1–1.5)
BILIRUB UR QL STRIP.AUTO: NEGATIVE
BILIRUB UR STRIP-MCNC: NEGATIVE MG/DL
BUN SERPL-MCNC: 9 MG/DL (ref 8–22)
CALCIUM ALBUM COR SERPL-MCNC: 9.5 MG/DL (ref 8.5–10.5)
CALCIUM SERPL-MCNC: 9.8 MG/DL (ref 8.5–10.5)
CHLORIDE SERPL-SCNC: 103 MMOL/L (ref 96–112)
CO2 SERPL-SCNC: 26 MMOL/L (ref 20–33)
COLOR UR AUTO: NORMAL
COLOR UR: YELLOW
CREAT SERPL-MCNC: 0.71 MG/DL (ref 0.5–1.4)
EOSINOPHIL # BLD AUTO: 0.1 K/UL (ref 0–0.51)
EOSINOPHIL NFR BLD: 1.5 % (ref 0–6.9)
ERYTHROCYTE [DISTWIDTH] IN BLOOD BY AUTOMATED COUNT: 44.1 FL (ref 35.9–50)
GFR SERPLBLD CREATININE-BSD FMLA CKD-EPI: 90 ML/MIN/1.73 M 2
GLOBULIN SER CALC-MCNC: 2.5 G/DL (ref 1.9–3.5)
GLUCOSE SERPL-MCNC: 208 MG/DL (ref 65–99)
GLUCOSE UR STRIP.AUTO-MCNC: 100 MG/DL
GLUCOSE UR STRIP.AUTO-MCNC: NEGATIVE MG/DL
HCT VFR BLD AUTO: 39.5 % (ref 37–47)
HGB BLD-MCNC: 12.8 G/DL (ref 12–16)
IMM GRANULOCYTES # BLD AUTO: 0.02 K/UL (ref 0–0.11)
IMM GRANULOCYTES NFR BLD AUTO: 0.3 % (ref 0–0.9)
KETONES UR STRIP.AUTO-MCNC: NEGATIVE MG/DL
KETONES UR STRIP.AUTO-MCNC: NEGATIVE MG/DL
LEUKOCYTE ESTERASE UR QL STRIP.AUTO: NEGATIVE
LEUKOCYTE ESTERASE UR QL STRIP.AUTO: NEGATIVE
LIPASE SERPL-CCNC: 49 U/L (ref 11–82)
LYMPHOCYTES # BLD AUTO: 2.71 K/UL (ref 1–4.8)
LYMPHOCYTES NFR BLD: 39.7 % (ref 22–41)
MCH RBC QN AUTO: 30.8 PG (ref 27–33)
MCHC RBC AUTO-ENTMCNC: 32.4 G/DL (ref 32.2–35.5)
MCV RBC AUTO: 95.2 FL (ref 81.4–97.8)
MICRO URNS: NORMAL
MONOCYTES # BLD AUTO: 0.53 K/UL (ref 0–0.85)
MONOCYTES NFR BLD AUTO: 7.8 % (ref 0–13.4)
NEUTROPHILS # BLD AUTO: 3.42 K/UL (ref 1.82–7.42)
NEUTROPHILS NFR BLD: 50 % (ref 44–72)
NITRITE UR QL STRIP.AUTO: NEGATIVE
NITRITE UR QL STRIP.AUTO: NEGATIVE
NRBC # BLD AUTO: 0 K/UL
NRBC BLD-RTO: 0 /100 WBC (ref 0–0.2)
PH UR STRIP.AUTO: 5 [PH] (ref 5–8)
PH UR STRIP.AUTO: 5.5 [PH] (ref 5–8)
PLATELET # BLD AUTO: 243 K/UL (ref 164–446)
PMV BLD AUTO: 10.6 FL (ref 9–12.9)
POTASSIUM SERPL-SCNC: 4.6 MMOL/L (ref 3.6–5.5)
PROT SERPL-MCNC: 6.9 G/DL (ref 6–8.2)
PROT UR QL STRIP: NEGATIVE MG/DL
PROT UR QL STRIP: NEGATIVE MG/DL
RBC # BLD AUTO: 4.15 M/UL (ref 4.2–5.4)
RBC UR QL AUTO: NEGATIVE
RBC UR QL AUTO: NEGATIVE
SODIUM SERPL-SCNC: 138 MMOL/L (ref 135–145)
SP GR UR STRIP.AUTO: 1
SP GR UR STRIP.AUTO: 1
UROBILINOGEN UR STRIP-MCNC: 0.2 MG/DL
UROBILINOGEN UR STRIP.AUTO-MCNC: 0.2 MG/DL
WBC # BLD AUTO: 6.8 K/UL (ref 4.8–10.8)

## 2023-07-25 PROCEDURE — 81003 URINALYSIS AUTO W/O SCOPE: CPT

## 2023-07-25 PROCEDURE — 85025 COMPLETE CBC W/AUTO DIFF WBC: CPT

## 2023-07-25 PROCEDURE — 81002 URINALYSIS NONAUTO W/O SCOPE: CPT | Performed by: NURSE PRACTITIONER

## 2023-07-25 PROCEDURE — 83690 ASSAY OF LIPASE: CPT

## 2023-07-25 PROCEDURE — 3078F DIAST BP <80 MM HG: CPT | Performed by: NURSE PRACTITIONER

## 2023-07-25 PROCEDURE — 99284 EMERGENCY DEPT VISIT MOD MDM: CPT

## 2023-07-25 PROCEDURE — 700117 HCHG RX CONTRAST REV CODE 255: Performed by: EMERGENCY MEDICINE

## 2023-07-25 PROCEDURE — 80053 COMPREHEN METABOLIC PANEL: CPT

## 2023-07-25 PROCEDURE — 74177 CT ABD & PELVIS W/CONTRAST: CPT

## 2023-07-25 PROCEDURE — 36415 COLL VENOUS BLD VENIPUNCTURE: CPT

## 2023-07-25 PROCEDURE — 99213 OFFICE O/P EST LOW 20 MIN: CPT | Performed by: NURSE PRACTITIONER

## 2023-07-25 PROCEDURE — 3075F SYST BP GE 130 - 139MM HG: CPT | Performed by: NURSE PRACTITIONER

## 2023-07-25 RX ORDER — IBUPROFEN 600 MG/1
600 TABLET ORAL EVERY 6 HOURS PRN
Qty: 16 TABLET | Refills: 0 | Status: SHIPPED | OUTPATIENT
Start: 2023-07-25 | End: 2023-07-29

## 2023-07-25 RX ADMIN — IOHEXOL 100 ML: 350 INJECTION, SOLUTION INTRAVENOUS at 20:35

## 2023-07-25 ASSESSMENT — ENCOUNTER SYMPTOMS
RESPIRATORY NEGATIVE: 1
CONSTITUTIONAL NEGATIVE: 1
FEVER: 0
CARDIOVASCULAR NEGATIVE: 1
GASTROINTESTINAL NEGATIVE: 1
EYES NEGATIVE: 1
NEUROLOGICAL NEGATIVE: 1
MUSCULOSKELETAL NEGATIVE: 1
CHILLS: 0

## 2023-07-25 ASSESSMENT — FIBROSIS 4 INDEX
FIB4 SCORE: 1.29
FIB4 SCORE: 1.29

## 2023-07-25 NOTE — ED TRIAGE NOTES
Bernadette Freitas  72 y.o.  Chief Complaint   Patient presents with    Flank Pain     RIGHT  Worsening x 2 weeks  Describes pain as 6/10 sharp  Radiating to RLQ  Denies dysuria    Sent from Urgent Care     For imaging     Ambulatory to triage with steady gait for above. A & O x 4, GCS 15.    Mild relief of symptoms with Tylenol taken at home.    Triage process explained to patient, apologized for wait time, and returned to lobby.

## 2023-07-25 NOTE — PROGRESS NOTES
"Subjective:   Bernadette Freitas is a 72 y.o. female who presents for Side Pain (R side pain/ abdominal pain x1 week )      Patient presents for evaluation of right sided abdominal pain that radiates to her umbilical area x one week.  Patient states that she is concerned she has appendicitis and has been waiting to get into her PCP and cannot get an appointment. She states that the pain is an 8/10 at all times.  She reports one day of diarrhea during this week and no other symptoms.  No nausea, vomiting, fever or chills.  She states the pain hurts all the time, and is worse with bending, twisting, sitting and standing.        Review of Systems   Constitutional: Negative.  Negative for chills and fever.   HENT: Negative.     Eyes: Negative.    Respiratory: Negative.     Cardiovascular: Negative.    Gastrointestinal: Negative.    Genitourinary:         Right sided lower abdominal pain   Musculoskeletal: Negative.    Skin: Negative.    Neurological: Negative.        Medications, Allergies, and current problem list reviewed today in Epic.     Objective:     /70 (BP Location: Right arm, Patient Position: Sitting, BP Cuff Size: Adult)   Pulse 62   Temp 36.3 °C (97.4 °F) (Temporal)   Resp 14   Ht 1.549 m (5' 1\")   Wt 68 kg (150 lb)   SpO2 98%     Physical Exam  Vitals reviewed.   Constitutional:       General: She is not in acute distress.     Appearance: Normal appearance. She is not ill-appearing.   HENT:      Head: Normocephalic and atraumatic.      Nose: Nose normal.      Mouth/Throat:      Mouth: Mucous membranes are moist.      Pharynx: Oropharynx is clear.   Eyes:      Extraocular Movements: Extraocular movements intact.      Conjunctiva/sclera: Conjunctivae normal.      Pupils: Pupils are equal, round, and reactive to light.   Cardiovascular:      Rate and Rhythm: Normal rate and regular rhythm.   Pulmonary:      Effort: Pulmonary effort is normal.      Breath sounds: Normal breath sounds.   Abdominal: "      General: Abdomen is flat. Bowel sounds are normal.      Palpations: Abdomen is soft.      Tenderness: There is no abdominal tenderness. There is no right CVA tenderness, left CVA tenderness, guarding or rebound. Negative signs include Bunn's sign, Rovsing's sign, McBurney's sign, psoas sign and obturator sign.   Musculoskeletal:         General: Normal range of motion.      Cervical back: Normal range of motion and neck supple.      Comments: Patient has pain to palpation over her right hip with reproducible bony tenderness to palpation.  She has limited ROM with forward flexion and extension, as well as lateral rotation.     Skin:     General: Skin is warm and dry.      Capillary Refill: Capillary refill takes less than 2 seconds.   Neurological:      General: No focal deficit present.      Mental Status: She is alert.   Psychiatric:         Mood and Affect: Mood normal.         Behavior: Behavior normal.       Lab Results/POC Test Results   Results for orders placed or performed in visit on 07/25/23   POCT Urinalysis   Result Value Ref Range    POC Color light yellow Negative    POC Appearance cloudy Negative    POC Glucose 100 Negative mg/dL    POC Bilirubin negative Negative mg/dL    POC Ketones negative Negative mg/dL    POC Specific Gravity 1.005 <1.005 - >1.030    POC Blood negative Negative    POC Urine PH 5.0 5.0 - 8.0    POC Protein negative Negative mg/dL    POC Urobiligen 0.2 Negative (0.2) mg/dL    POC Nitrites negative Negative    POC Leukocyte Esterase negative Negative           Assessment/Plan:     Diagnosis and associated orders:     1. Right lower quadrant pain        2. Right hip pain           Comments/MDM:     Discussed with patient that I believe her right sided pain is originating from her right hip, possibly her right back.  She has a completley benign abdominal exam today.  She is very concerned she has appendicitis and we discussed that within the limits of urgent care, I cannot  perform the necessary workup to determine whether she has appendicitis at this time.  She has elected to go to the ER for higher level of care as this is her main concern today due to her pain level of 8/10.  Discussed options with patient and we agreed that being evaluated in the ER would be the best option for her to have a diagnosis today rather than wait for outpatient workup.           Differential diagnosis, natural history, supportive care, and indications for immediate follow-up discussed.    Advised the patient to follow-up with the primary care physician for recheck, reevaluation, and consideration of further management.    Please note that this dictation was created using voice recognition software. I have made a reasonable attempt to correct obvious errors, but I expect that there are errors of grammar and possibly content that I did not discover before finalizing the note.    This note was electronically signed by DEVAN Worthy

## 2023-07-26 NOTE — ED NOTES
Patient provided discharge instructions. Patient verbalized understanding. Patient leaving ER in stable condition. Patient ambulatory with steady gait. Wristband and IV removed.

## 2023-07-26 NOTE — ED NOTES
Pt ambulated independently to yellow pod, agree with triage RN note, NAD, gCS 15, up for ERP to see

## 2023-07-26 NOTE — ED NOTES
Bedside report received from Italo LEAL. Assumed care of patient. Pt assessed, AAO x 4 . Patient's concerns addressed. Patient aware of POC. Fall precautions in place.  Pt repositioned and comfortable.  Call light within reach.

## 2023-07-26 NOTE — ED PROVIDER NOTES
ED Provider Note    CHIEF COMPLAINT  Chief Complaint   Patient presents with    Flank Pain     RIGHT  Worsening x 2 weeks  Describes pain as 6/10 sharp  Radiating to RLQ  Denies dysuria    Sent from Urgent Care     For imaging       EXTERNAL RECORDS REVIEWED  Outpatient Notes urgent care visit today with noted right lower quadrant abdominal pain, tenderness towards the hip negative urinalysis    HPI/ROS  LIMITATION TO HISTORY   Select: : None  OUTSIDE HISTORIAN(S):  none    Bernadette Freitas is a 72 y.o. female who presents with right hip and flank pain.  Patient reports pain for around the last 2 weeks, states no known injury it just seem to start 1 day.  It has been fairly persistent over the last 2 weeks without really changing.  The pain does shoot somewhat down her leg, no other radiation.  She reports no vomiting nausea or diarrhea.  No fevers or chills.  No urinary symptoms.  No focal weakness numbness or tingling.  No incontinence.  She was seen at urgent care and referred here    PAST MEDICAL HISTORY   has a past medical history of Depression, Diabetes (HCC), Dyslipidemia, HTN (hypertension), Hypercholesteremia, Hypothyroidism, Impaired fasting glucose, and Vaginal candidiasis (4/3/2023).    SURGICAL HISTORY   has a past surgical history that includes primary c section; thyroidectomy total; colonoscopy (); gastroscopy (); breast biopsy (3/13); colonoscopy with polyp (2013); colonoscopy with polyp (10/30/2017); and cataract extraction with iol (Bilateral, 2020, 2020).    FAMILY HISTORY  Family History   Problem Relation Age of Onset    Cancer Mother         esophagus    Genitourinary () Problems Sister         renal failure on dialysis    Hyperlipidemia Sister     Hyperlipidemia Sister        SOCIAL HISTORY  Social History     Tobacco Use    Smoking status: Former     Packs/day: 0.00     Types: Cigarettes     Quit date: 1998     Years since quittin.7    Smokeless tobacco:  "Never    Tobacco comments:     quit 1998, smoked 1-2 cig/day for 5 yrs   Vaping Use    Vaping Use: Never used   Substance and Sexual Activity    Alcohol use: Not Currently     Comment: rare    Drug use: No    Sexual activity: Yes     Partners: Male       CURRENT MEDICATIONS  Home Medications       Reviewed by Sherry Tenorio R.N. (Registered Nurse) on 07/25/23 at 1615  Med List Status: Partial     Medication Last Dose Status   atorvastatin (LIPITOR) 40 MG Tab  Active   Blood Glucose Monitoring Suppl Device  Active   carvedilol (COREG) 6.25 MG Tab  Active   ciclopirox (PENLAC) 8 % solution  Active   Continuous Blood Gluc  (FREESTYLE MURIEL 2 READER) Device  Active   Continuous Blood Gluc Sensor (FREESTYLE MURIEL 2 SENSOR) Misc  Active   escitalopram (LEXAPRO) 10 MG Tab  Active   famotidine (PEPCID) 20 MG Tab  Active   fluticasone (FLONASE) 50 MCG/ACT nasal spray  Active   glipiZIDE SR (GLUCOTROL) 5 MG TABLET SR 24 HR  Active   glucose blood (ONE TOUCH ULTRA TEST) strip  Active   levothyroxine (SYNTHROID) 75 MCG Tab  Active   losartan (COZAAR) 100 MG Tab  Active   metformin (GLUCOPHAGE) 1000 MG tablet  Active   ONETOUCH DELICA LANCETS 33G Misc  Active   ONETOUCH ULTRA strip  Active   pantoprazole (PROTONIX) 40 MG Tablet Delayed Response  Active                    ALLERGIES  Allergies   Allergen Reactions    Hydrochlorothiazide Rash     rash    Penicillin G Unspecified     Can tolerate Augmentin       PHYSICAL EXAM  VITAL SIGNS: BP (!) 165/73   Pulse (!) 59   Temp 36.3 °C (97.4 °F) (Temporal)   Resp 18   Ht 1.549 m (5' 1\")   Wt 68 kg (149 lb 14.6 oz)   LMP 11/01/2006   SpO2 96%   BMI 28.33 kg/m²      Pulse ox interpretation: I interpret this pulse ox as normal.  Constitutional: Alert in no apparent distress.  HENT: No signs of trauma, Bilateral external ears normal, Nose normal.   Eyes: Pupils are equal and reactive, Conjunctiva normal, Non-icteric.   Neck: Normal range of motion, No tenderness, Supple, " No stridor.   Cardiovascular: Regular rate and rhythm, no murmurs.   Thorax & Lungs: Normal breath sounds, No respiratory distress, No wheezing, No chest tenderness.   Abdomen: Bowel sounds normal, Soft, No tenderness, No masses, No pulsatile masses. No peritoneal signs.  Skin: Warm, Dry, No erythema, No rash.   Back: No bony tenderness, No CVA tenderness.   Extremities: Intact distal pulses, No edema, No tenderness, No cyanosis,  Negative Sandra's sign.   Musculoskeletal: Tenderness over the right iliac crest and towards the greater trochanter pain to that hip with range of motion, no deformities or swelling noted good range of motion in all major joints. No tenderness to palpation or major deformities noted.   Neurologic: Alert , Normal motor function, Normal sensory function, No focal deficits noted.   Psychiatric: Affect normal, Judgment normal, Mood normal.               DIAGNOSTIC STUDIES / PROCEDURES      LABS  Labs Reviewed   CBC WITH DIFFERENTIAL - Abnormal; Notable for the following components:       Result Value    RBC 4.15 (*)     All other components within normal limits   COMP METABOLIC PANEL - Abnormal; Notable for the following components:    Glucose 208 (*)     All other components within normal limits   LIPASE   URINALYSIS   ESTIMATED GFR         RADIOLOGY  I have independently interpreted the diagnostic imaging associated with this visit and am waiting the final reading from the radiologist.   My preliminary interpretation is as follows: no free air   Radiologist interpretation:   CT-ABDOMEN-PELVIS WITH   Final Result         1.  Hepatomegaly   2.  Hepatic steatosis   3.  Diverticulosis   4.  Atherosclerosis and atherosclerotic coronary artery disease            COURSE & MEDICAL DECISION MAKING    ED Observation Status? Yes; I am placing the patient in to an observation status due to a diagnostic uncertainty as well as therapeutic intensity. Patient placed in observation status at 6:43 PM,  7/25/2023.     Observation plan is as follows: Serial abdominal examinations    Upon Reevaluation, the patient's condition has: Improved; and will be discharged.    Patient discharged from ED Observation status at 9:43 PM   (Time) 07/25/23   (Date).     INITIAL ASSESSMENT, COURSE AND PLAN  Care Narrative: 6:43 PM  Patient is evaluated at bedside, at this point differential includes acute pathology most likely musculoskeletal/sciatic pain, also consideration for appendicitis given the location of her pain although this seems less likely, consideration for urinary tract infection as well, I have ordered for CT scan, diagnostic labs, patient declines pain medication    9:44 PM  Patient is reevaluated, updated on all results, she is comfortable, agreeable to discharge        PROBLEM LIST  #Hip pain.  This does seem primarily musculoskeletal in nature, there is no evidence of acute traumatic injury fracture or dislocation.  Patient was quite concerned this might be her appendix and advanced imaging was obtained, there is no evidence of appendicitis or other acute intra-abdominal pathology.  No evidence of urinary tract infection findings of metabolic disturbance.  She is overall quite well-appearing.  Will prescribe ibuprofen, primary care follow-up    ADDITIONAL PROBLEM LIST    Hypertension, chronic, primary care follow-up    Diabetes chronic, primary care follow-up  DISPOSITION AND DISCUSSIONS    Barriers to care at this time, including but not limited to:  none .     Decision tools and prescription drugs considered including, but not limited to: Pain Medications motrin 600 mg .    The patient will return for new or worsening symptoms and is stable at the time of discharge.    The patient is referred to a primary physician for blood pressure management, diabetic screening, and for all other preventative health concerns.        DISPOSITION:  Patient will be discharged home in stable condition.    FOLLOW UP:  [place]  ALANA Stephenson  [address] [address]  [phone]    In 1 week        OUTPATIENT MEDICATIONS:  New Prescriptions    IBUPROFEN (MOTRIN) 600 MG TAB    Take 1 Tablet by mouth every 6 hours as needed for Moderate Pain for up to 4 days.         FINAL DIAGNOSIS  1. Hip pain, acute, right           Electronically signed by: Sarbjit Rooney M.D., 7/25/2023 6:42 PM

## 2023-07-26 NOTE — ED NOTES
Pt ambulated to restroom with steady gait. Provided urine sample, this tech sent urine to lab for UA. Pt ambulated back to [facility] with steady gait.

## 2023-07-31 ENCOUNTER — OFFICE VISIT (OUTPATIENT)
Dept: MEDICAL GROUP | Facility: PHYSICIAN GROUP | Age: 73
End: 2023-07-31
Payer: COMMERCIAL

## 2023-07-31 VITALS
RESPIRATION RATE: 14 BRPM | DIASTOLIC BLOOD PRESSURE: 82 MMHG | HEART RATE: 106 BPM | SYSTOLIC BLOOD PRESSURE: 120 MMHG | BODY MASS INDEX: 28.89 KG/M2 | HEIGHT: 61 IN | TEMPERATURE: 97.8 F | OXYGEN SATURATION: 95 % | WEIGHT: 153 LBS

## 2023-07-31 DIAGNOSIS — I10 PRIMARY HYPERTENSION: ICD-10-CM

## 2023-07-31 DIAGNOSIS — S39.011D ABDOMINAL WALL STRAIN, SUBSEQUENT ENCOUNTER: ICD-10-CM

## 2023-07-31 PROCEDURE — 3074F SYST BP LT 130 MM HG: CPT | Performed by: FAMILY MEDICINE

## 2023-07-31 PROCEDURE — 99213 OFFICE O/P EST LOW 20 MIN: CPT | Performed by: FAMILY MEDICINE

## 2023-07-31 PROCEDURE — 3079F DIAST BP 80-89 MM HG: CPT | Performed by: FAMILY MEDICINE

## 2023-07-31 RX ORDER — LIDOCAINE 4 G/G
1 PATCH TOPICAL
Qty: 30 PATCH | Refills: 3 | Status: SHIPPED | OUTPATIENT
Start: 2023-07-31

## 2023-07-31 RX ORDER — CARVEDILOL 6.25 MG/1
6.25 TABLET ORAL 2 TIMES DAILY WITH MEALS
Qty: 180 TABLET | Refills: 3 | Status: SHIPPED | OUTPATIENT
Start: 2023-07-31 | End: 2023-08-15

## 2023-07-31 ASSESSMENT — FIBROSIS 4 INDEX: FIB4 SCORE: 1.31

## 2023-07-31 NOTE — PROGRESS NOTES
"CHIEF COMPLAINT / REASON FOR VISIT  Bernadette Freitas is a 72 y.o. female that presents today for f/u     HISTORY OF PRESENT ILLNESS  Her pain has improved. She is wondering about the significance of the diverticulosis    OBJECTIVE     /82 (BP Location: Left arm, Patient Position: Sitting, BP Cuff Size: Adult)   Pulse (!) 106   Temp 36.6 °C (97.8 °F) (Temporal)   Resp 14   Ht 1.549 m (5' 1\")   Wt 69.4 kg (153 lb)   LMP 11/01/2006   SpO2 95%   BMI 28.91 kg/m²      PHYSICAL EXAM  Constitutional: Sitting comfortably, in no acute distress, responds to questions appropriately.  Abdomen: Soft, nontender  Extremities: No lower extremity edema  Skin: Warm and dry, no rashes or lesions on face or exposed upper extremities    ASSESSMENT & PLAN  1. Abdominal wall strain, subsequent encounter  Right lower lateral abdominal wall pain, suspect muscular strain.  CT abdomen/pelvis in the ED was negative for acute pathology.  It incidentally showed diverticulosis, and I educated her about the physiology and possible complications of diverticuli.  Her pain is improving overall, advised that she can use as needed ibuprofen and/or lidocaine patches    2. Primary hypertension  Well-controlled on carvedilol 6.25 mg twice daily and losartan 100 mg daily.  Continue current therapy.  - carvedilol (COREG) 6.25 MG Tab; Take 1 Tablet by mouth 2 times a day with meals.  Dispense: 180 Tablet; Refill: 3        "

## 2023-08-14 ENCOUNTER — OFFICE VISIT (OUTPATIENT)
Dept: URGENT CARE | Facility: PHYSICIAN GROUP | Age: 73
End: 2023-08-14
Payer: COMMERCIAL

## 2023-08-14 VITALS
DIASTOLIC BLOOD PRESSURE: 80 MMHG | HEART RATE: 64 BPM | HEIGHT: 61 IN | TEMPERATURE: 97.8 F | SYSTOLIC BLOOD PRESSURE: 170 MMHG | OXYGEN SATURATION: 97 % | WEIGHT: 152.8 LBS | BODY MASS INDEX: 28.85 KG/M2 | RESPIRATION RATE: 16 BRPM

## 2023-08-14 DIAGNOSIS — R51.9 ACUTE NONINTRACTABLE HEADACHE, UNSPECIFIED HEADACHE TYPE: ICD-10-CM

## 2023-08-14 DIAGNOSIS — I10 ELEVATED BLOOD PRESSURE READING IN OFFICE WITH DIAGNOSIS OF HYPERTENSION: ICD-10-CM

## 2023-08-14 DIAGNOSIS — R07.9 CHEST PAIN, UNSPECIFIED TYPE: ICD-10-CM

## 2023-08-14 DIAGNOSIS — R42 DIZZINESS: ICD-10-CM

## 2023-08-14 PROCEDURE — 99213 OFFICE O/P EST LOW 20 MIN: CPT

## 2023-08-14 PROCEDURE — 3077F SYST BP >= 140 MM HG: CPT

## 2023-08-14 PROCEDURE — 3079F DIAST BP 80-89 MM HG: CPT

## 2023-08-14 ASSESSMENT — FIBROSIS 4 INDEX: FIB4 SCORE: 1.31

## 2023-08-15 RX ORDER — CARVEDILOL 12.5 MG/1
12.5 TABLET ORAL 2 TIMES DAILY WITH MEALS
Qty: 60 TABLET | Refills: 3 | Status: SHIPPED | OUTPATIENT
Start: 2023-08-15 | End: 2023-11-28

## 2023-08-15 NOTE — PROGRESS NOTES
Subjective     Bernadette Freitas is a 72 y.o. female who presents with Blood Pressure Problem (High BP, chest pains, palpitations, dizziness, headache, x1 week )            HPI She says she was changed from metoprolol 50 mg. She was placed on the hydrochlorothiazide but she had an allergic reaction of hives.   She was then started on  carvedilol.     Patient states that she is taking her carvedilol 2x a day.  She is monitoring her blood pressure which has been high for the past week.  She says she has been checking it multiple times a day for the past week and she is always high.   She says she has attempted to reach her PCP but has been unable to be seen.   She feels like she is  having palpitation, intermittent dizziness, head ache,and chest pain.   She says she has had all of the symptoms for the past week.   Her chest pain feels like pain and palpitation, like she is getting nervous, mostly at night.   The head ache and the dizziness are both intermittent.     ROS Same as above.        Allergies   Allergen Reactions    Hydrochlorothiazide Rash     rash    Penicillin G Unspecified     Can tolerate Augmentin       Current Outpatient Medications   Medication Sig    carvedilol (COREG) 6.25 MG Tab Take 1 Tablet by mouth 2 times a day with meals.    Lidocaine 4 % Patch Apply 1 Application topically 1 time a day as needed (pain).    Continuous Blood Gluc Sensor (FREESTYLE MURIEL 2 SENSOR) Misc Freestyle muriel sensor. Check blood glucose 1-2 times per day    Continuous Blood Gluc  (FREESTYLE MURIEL 2 READER) Device Freestyle muriel reader. Check blood glucose 1-2 times per day.    ciclopirox (PENLAC) 8 % solution Apply 1 Application topically every evening. Use rubbing alcohol to remove coat prior to re-applying    pantoprazole (PROTONIX) 40 MG Tablet Delayed Response Take 40 mg by mouth 2 times a day.    fluticasone (FLONASE) 50 MCG/ACT nasal spray SHAKE LIQUID AND USE 2 SPRAYS IN EACH NOSTRIL ONCE DAILY     "famotidine (PEPCID) 20 MG Tab     losartan (COZAAR) 100 MG Tab Take 1 Tablet by mouth every day.    glipiZIDE SR (GLUCOTROL) 5 MG TABLET SR 24 HR Take 1 Tablet by mouth every day.    metformin (GLUCOPHAGE) 1000 MG tablet Take 1 Tablet by mouth 2 times a day with meals.    escitalopram (LEXAPRO) 10 MG Tab Take 1 Tablet by mouth every day.    atorvastatin (LIPITOR) 40 MG Tab Take 1 Tablet by mouth every evening.    levothyroxine (SYNTHROID) 75 MCG Tab Take 1 Tablet by mouth every morning on an empty stomach.    Blood Glucose Monitoring Suppl Device Meter: Dispense Device of Insurance Preference. Sig. Use as directed for blood sugar monitoring. #1. NR.    ONETOUCH ULTRA strip USE  STRIP TO CHECK GLUCOSE ONCE DAILY BEFORE  BREAKFAST  AND  AS  NEEDED  FOR  SYMPTOMS  OF  HIGH  OR  LOW  BLOOD  SUGARS    glucose blood (ONE TOUCH ULTRA TEST) strip USE ONCE DAILY BEFORE BREAKFAST AND AS NEEDED FOR SYMPTOMS OF HIGH OR LOW BLOOS SUGAR    ONETOUCH DELICA LANCETS 33G Misc USE ONCE DAILY BEFORE BREAKFAST AND AS NEEDED FOR SYMPTOMS OF HIGH OR LOW BLOOD SUGAR        Past Medical History:   Diagnosis Date    Depression     Diabetes (HCC)     Dyslipidemia     HTN (hypertension)     Hypercholesteremia     Hypothyroidism     Impaired fasting glucose     Vaginal candidiasis 4/3/2023              Objective     BP (!) 170/80 (BP Location: Right arm, Patient Position: Sitting, BP Cuff Size: Adult)   Pulse 64   Temp 36.6 °C (97.8 °F) (Temporal)   Resp 16   Ht 1.549 m (5' 1\")   Wt 69.3 kg (152 lb 12.8 oz)   LMP 11/01/2006   SpO2 97%   BMI 28.87 kg/m²      Physical Exam  Constitutional:       Appearance: Normal appearance. She is not ill-appearing.   HENT:      Head: Normocephalic and atraumatic.      Right Ear: External ear normal.      Left Ear: External ear normal.      Nose: Nose normal. No congestion.      Mouth/Throat:      Mouth: Mucous membranes are moist.   Eyes:      Extraocular Movements:      Right eye: Normal extraocular " motion and no nystagmus.      Left eye: Normal extraocular motion and no nystagmus.      Pupils: Pupils are equal, round, and reactive to light.   Cardiovascular:      Rate and Rhythm: Normal rate and regular rhythm.      Heart sounds: Normal heart sounds. No murmur heard.  Pulmonary:      Effort: Pulmonary effort is normal. No respiratory distress.      Breath sounds: No stridor. No wheezing, rhonchi or rales.   Chest:      Chest wall: No tenderness.   Abdominal:      Palpations: Abdomen is soft.   Musculoskeletal:         General: Normal range of motion.      Cervical back: Normal range of motion and neck supple.   Skin:     General: Skin is warm and dry.   Neurological:      Mental Status: She is alert and oriented to person, place, and time.      Cranial Nerves: Cranial nerves 2-12 are intact.      Sensory: Sensation is intact.      Motor: Motor function is intact.      Coordination: Coordination is intact.      Gait: Gait is intact.           Assessment & Plan        Patient presents due to having a week of elevated blood pressure.  She states her medications have been changed approximately 2 weeks ago.  She states she has been checking her blood pressure and it is consistently elevated.  She states she attempted to reach out to her PCP but has been unable to be seen.  She states the entire 2 weeks she has been having intermittent dizziness and headaches, with intermittent feelings of palpitations and chest pain.    In clinic today, her lung sounds are clear, no wheezing or rhonchi, SPO2 97%.  Heart sounds are normal, regular rhythm, no murmur.  ECG completed in clinic today showed normal ECG.  Patient with normal neurological exam, cranial nerves II through XII intact, no motor function weakness, sensation intact.  Coordination intact and gait intact.  There is no abnormal EOM, no nystagmus, PERRL.  Discussed patient's ECG results today in clinic.  Educated that since she is symptomatic with intermittent chest  pain, feelings of palpitations, headache and dizziness advising patient to present to the ED.  Discussed risk of stroke and MI with patient.  Patient does not wish to present to the ED at this time.  She wishes to discuss further with family.  Educated patient to promptly present to ED if any of her symptoms increase.  Patient verbalizes understanding.      1. Elevated blood pressure reading in office with diagnosis of hypertension  EKG - Clinic Performed    UC AMA/Refusal of Treatment      2. Dizziness  EKG - Clinic Performed    UC AMA/Refusal of Treatment      3. Chest pain, unspecified type  EKG - Clinic Performed    UC AMA/Refusal of Treatment      4. Acute nonintractable headache, unspecified headache type

## 2023-10-31 ENCOUNTER — HOSPITAL ENCOUNTER (OUTPATIENT)
Dept: LAB | Facility: MEDICAL CENTER | Age: 73
End: 2023-10-31
Attending: FAMILY MEDICINE
Payer: COMMERCIAL

## 2023-10-31 DIAGNOSIS — E78.5 DYSLIPIDEMIA: ICD-10-CM

## 2023-10-31 DIAGNOSIS — E03.9 HYPOTHYROIDISM, UNSPECIFIED TYPE: ICD-10-CM

## 2023-10-31 DIAGNOSIS — E11.65 UNCONTROLLED TYPE 2 DIABETES MELLITUS WITH HYPERGLYCEMIA (HCC): ICD-10-CM

## 2023-10-31 DIAGNOSIS — I10 PRIMARY HYPERTENSION: ICD-10-CM

## 2023-10-31 LAB
ALBUMIN SERPL BCP-MCNC: 4.4 G/DL (ref 3.2–4.9)
ALBUMIN/GLOB SERPL: 1.8 G/DL
ALP SERPL-CCNC: 47 U/L (ref 30–99)
ALT SERPL-CCNC: 29 U/L (ref 2–50)
ANION GAP SERPL CALC-SCNC: 13 MMOL/L (ref 7–16)
AST SERPL-CCNC: 29 U/L (ref 12–45)
BILIRUB SERPL-MCNC: 0.4 MG/DL (ref 0.1–1.5)
BUN SERPL-MCNC: 13 MG/DL (ref 8–22)
CALCIUM ALBUM COR SERPL-MCNC: 9.4 MG/DL (ref 8.5–10.5)
CALCIUM SERPL-MCNC: 9.7 MG/DL (ref 8.5–10.5)
CHLORIDE SERPL-SCNC: 102 MMOL/L (ref 96–112)
CHOLEST SERPL-MCNC: 127 MG/DL (ref 100–199)
CO2 SERPL-SCNC: 25 MMOL/L (ref 20–33)
CREAT SERPL-MCNC: 0.84 MG/DL (ref 0.5–1.4)
EST. AVERAGE GLUCOSE BLD GHB EST-MCNC: 232 MG/DL
GFR SERPLBLD CREATININE-BSD FMLA CKD-EPI: 74 ML/MIN/1.73 M 2
GLOBULIN SER CALC-MCNC: 2.5 G/DL (ref 1.9–3.5)
GLUCOSE SERPL-MCNC: 165 MG/DL (ref 65–99)
HBA1C MFR BLD: 9.7 % (ref 4–5.6)
HDLC SERPL-MCNC: 47 MG/DL
LDLC SERPL CALC-MCNC: 56 MG/DL
POTASSIUM SERPL-SCNC: 4.4 MMOL/L (ref 3.6–5.5)
PROT SERPL-MCNC: 6.9 G/DL (ref 6–8.2)
SODIUM SERPL-SCNC: 140 MMOL/L (ref 135–145)
T4 FREE SERPL-MCNC: 1.17 NG/DL (ref 0.93–1.7)
TRIGL SERPL-MCNC: 122 MG/DL (ref 0–149)
TSH SERPL DL<=0.005 MIU/L-ACNC: 7.23 UIU/ML (ref 0.38–5.33)

## 2023-10-31 PROCEDURE — 83036 HEMOGLOBIN GLYCOSYLATED A1C: CPT

## 2023-10-31 PROCEDURE — 80061 LIPID PANEL: CPT

## 2023-10-31 PROCEDURE — 80053 COMPREHEN METABOLIC PANEL: CPT

## 2023-10-31 PROCEDURE — 84439 ASSAY OF FREE THYROXINE: CPT

## 2023-10-31 PROCEDURE — 84443 ASSAY THYROID STIM HORMONE: CPT

## 2023-10-31 PROCEDURE — 36415 COLL VENOUS BLD VENIPUNCTURE: CPT

## 2023-11-06 ENCOUNTER — OFFICE VISIT (OUTPATIENT)
Dept: MEDICAL GROUP | Facility: PHYSICIAN GROUP | Age: 73
End: 2023-11-06
Payer: COMMERCIAL

## 2023-11-06 VITALS
WEIGHT: 155.7 LBS | TEMPERATURE: 97.9 F | SYSTOLIC BLOOD PRESSURE: 112 MMHG | RESPIRATION RATE: 18 BRPM | HEART RATE: 72 BPM | OXYGEN SATURATION: 93 % | BODY MASS INDEX: 29.39 KG/M2 | HEIGHT: 61 IN | DIASTOLIC BLOOD PRESSURE: 80 MMHG

## 2023-11-06 DIAGNOSIS — E03.9 HYPOTHYROIDISM, UNSPECIFIED TYPE: ICD-10-CM

## 2023-11-06 DIAGNOSIS — I10 PRIMARY HYPERTENSION: ICD-10-CM

## 2023-11-06 DIAGNOSIS — E11.65 UNCONTROLLED TYPE 2 DIABETES MELLITUS WITH HYPERGLYCEMIA (HCC): ICD-10-CM

## 2023-11-06 PROBLEM — B37.31 VAGINAL CANDIDIASIS: Status: RESOLVED | Noted: 2023-04-03 | Resolved: 2023-11-06

## 2023-11-06 PROBLEM — J06.9 VIRAL UPPER RESPIRATORY TRACT INFECTION: Status: RESOLVED | Noted: 2023-04-03 | Resolved: 2023-11-06

## 2023-11-06 PROCEDURE — 3074F SYST BP LT 130 MM HG: CPT | Performed by: FAMILY MEDICINE

## 2023-11-06 PROCEDURE — 99214 OFFICE O/P EST MOD 30 MIN: CPT | Performed by: FAMILY MEDICINE

## 2023-11-06 PROCEDURE — 3079F DIAST BP 80-89 MM HG: CPT | Performed by: FAMILY MEDICINE

## 2023-11-06 RX ORDER — TIRZEPATIDE 2.5 MG/.5ML
2.5 INJECTION, SOLUTION SUBCUTANEOUS
Qty: 2 ML | Refills: 3 | Status: SHIPPED | OUTPATIENT
Start: 2023-11-06 | End: 2023-11-06

## 2023-11-06 RX ORDER — LEVOTHYROXINE SODIUM 88 UG/1
88 TABLET ORAL
Qty: 90 TABLET | Refills: 3 | Status: SHIPPED | OUTPATIENT
Start: 2023-11-06

## 2023-11-06 RX ORDER — TIRZEPATIDE 5 MG/.5ML
5 INJECTION, SOLUTION SUBCUTANEOUS
Qty: 2 ML | Refills: 3 | Status: SHIPPED | OUTPATIENT
Start: 2023-11-06 | End: 2024-01-20 | Stop reason: SDUPTHER

## 2023-11-06 RX ORDER — TIRZEPATIDE 2.5 MG/.5ML
2.5 INJECTION, SOLUTION SUBCUTANEOUS
Qty: 2 ML | Refills: 0 | Status: SHIPPED | OUTPATIENT
Start: 2023-11-06 | End: 2024-01-22

## 2023-11-06 ASSESSMENT — FIBROSIS 4 INDEX: FIB4 SCORE: 1.6

## 2023-11-06 NOTE — PROGRESS NOTES
"CHIEF COMPLAINT / REASON FOR VISIT  Bernadette Freitas is a 73 y.o. female that presents today for follow up    HISTORY OF PRESENT ILLNESS    Jardiance gave her yeast infection previously    OBJECTIVE     BP (!) 142/64 (BP Location: Right arm, Patient Position: Sitting, BP Cuff Size: Adult)   Pulse 72   Temp 36.6 °C (97.9 °F) (Temporal)   Resp 18   Ht 1.549 m (5' 1\")   Wt 70.6 kg (155 lb 11.2 oz)   LMP 11/01/2006   SpO2 93%   BMI 29.42 kg/m²      PHYSICAL EXAM  Constitutional: Sitting comfortably, in no acute distress, responds to questions appropriately.  Eyes:  No conjunctival injection, no scleral icterus, PERRL  Skin: Warm and dry, no rashes or lesions on face or exposed upper extremities    ASSESSMENT & PLAN  1. Uncontrolled type 2 diabetes mellitus with hyperglycemia (HCC)  Chronic, uncontrolled with hemoglobin A1c 9.7, goal less than 7.  Currently taking metformin 1000 mg twice daily and glipizide 5 mg daily.  Did not tolerate Jardiance due to yeast infections.  We will start tirzepatide 2.5 mg daily, increase to 5 mg in 1 month  - Tirzepatide (MOUNJARO) 2.5 MG/0.5ML Solution Pen-injector; Inject 2.5 mg under the skin every 7 days. After prescription is finished, increase to 5 mg dose (separate prescription sent to pharmacy)  Dispense: 2 mL; Refill: 0  - HEMOGLOBIN A1C; Future  - Comp Metabolic Panel; Future    2. Hypothyroidism, unspecified type  Chronic, uncontrolled with elevated TSH and normal free T4, will increase levothyroxine to 88 mcg daily, recheck TSH prior to next visit  - levothyroxine (SYNTHROID) 88 MCG Tab; Take 1 Tablet by mouth every morning on an empty stomach.  Dispense: 90 Tablet; Refill: 3  - TSH WITH REFLEX TO FT4; Future    3. Primary hypertension  Chronic, well controlled on carvedilol 12.5 mg twice daily and losartan 100 mg daily.  - CBC WITH DIFFERENTIAL; Future     Follow-up in 3 months for repeat diabetes visit  "

## 2023-11-28 RX ORDER — CARVEDILOL 12.5 MG/1
12.5 TABLET ORAL 2 TIMES DAILY WITH MEALS
Qty: 180 TABLET | Refills: 3 | Status: SHIPPED | OUTPATIENT
Start: 2023-11-28 | End: 2023-12-19

## 2023-12-19 RX ORDER — CARVEDILOL 12.5 MG/1
12.5 TABLET ORAL 2 TIMES DAILY WITH MEALS
Qty: 180 TABLET | Refills: 3 | Status: SHIPPED | OUTPATIENT
Start: 2023-12-19

## 2023-12-22 ENCOUNTER — PATIENT MESSAGE (OUTPATIENT)
Dept: MEDICAL GROUP | Facility: PHYSICIAN GROUP | Age: 73
End: 2023-12-22
Payer: COMMERCIAL

## 2024-01-03 ENCOUNTER — PATIENT MESSAGE (OUTPATIENT)
Dept: MEDICAL GROUP | Facility: PHYSICIAN GROUP | Age: 74
End: 2024-01-03
Payer: COMMERCIAL

## 2024-01-03 DIAGNOSIS — K21.9 GASTROESOPHAGEAL REFLUX DISEASE, UNSPECIFIED WHETHER ESOPHAGITIS PRESENT: ICD-10-CM

## 2024-01-03 RX ORDER — FAMOTIDINE 20 MG/1
20 TABLET, FILM COATED ORAL NIGHTLY
Qty: 45 TABLET | Refills: 0 | Status: SHIPPED | OUTPATIENT
Start: 2024-01-03 | End: 2024-01-03 | Stop reason: SDUPTHER

## 2024-01-03 RX ORDER — PANTOPRAZOLE SODIUM 40 MG/1
40 TABLET, DELAYED RELEASE ORAL 2 TIMES DAILY
Qty: 90 TABLET | Refills: 0 | Status: SHIPPED | OUTPATIENT
Start: 2024-01-03 | End: 2024-01-03

## 2024-01-03 RX ORDER — FAMOTIDINE 20 MG/1
20 TABLET, FILM COATED ORAL 2 TIMES DAILY
Qty: 180 TABLET | Refills: 3 | Status: SHIPPED | OUTPATIENT
Start: 2024-01-03 | End: 2024-02-20 | Stop reason: SDUPTHER

## 2024-01-20 DIAGNOSIS — E11.65 UNCONTROLLED TYPE 2 DIABETES MELLITUS WITH HYPERGLYCEMIA (HCC): ICD-10-CM

## 2024-01-22 RX ORDER — TIRZEPATIDE 5 MG/.5ML
5 INJECTION, SOLUTION SUBCUTANEOUS
Qty: 6 ML | Refills: 3 | Status: SHIPPED | OUTPATIENT
Start: 2024-01-22 | End: 2024-03-28

## 2024-01-22 NOTE — TELEPHONE ENCOUNTER
Received request via: Patient    Was the patient seen in the last year in this department? Yes    Does the patient have an active prescription (recently filled or refills available) for medication(s) requested? No    Pharmacy Name: [facility]    Does the patient have half-way Plus and need 100 day supply (blood pressure, diabetes and cholesterol meds only)? Patient does not have SCP

## 2024-02-20 ENCOUNTER — HOSPITAL ENCOUNTER (OUTPATIENT)
Dept: LAB | Facility: MEDICAL CENTER | Age: 74
End: 2024-02-20
Attending: FAMILY MEDICINE
Payer: COMMERCIAL

## 2024-02-20 ENCOUNTER — HOSPITAL ENCOUNTER (EMERGENCY)
Facility: MEDICAL CENTER | Age: 74
End: 2024-02-20
Attending: EMERGENCY MEDICINE
Payer: COMMERCIAL

## 2024-02-20 VITALS
SYSTOLIC BLOOD PRESSURE: 148 MMHG | TEMPERATURE: 98 F | WEIGHT: 147.71 LBS | BODY MASS INDEX: 27.89 KG/M2 | OXYGEN SATURATION: 96 % | DIASTOLIC BLOOD PRESSURE: 65 MMHG | RESPIRATION RATE: 16 BRPM | HEIGHT: 61 IN | HEART RATE: 81 BPM

## 2024-02-20 DIAGNOSIS — I10 PRIMARY HYPERTENSION: ICD-10-CM

## 2024-02-20 DIAGNOSIS — E11.65 UNCONTROLLED TYPE 2 DIABETES MELLITUS WITH HYPERGLYCEMIA (HCC): ICD-10-CM

## 2024-02-20 DIAGNOSIS — U07.1 COVID-19: Primary | ICD-10-CM

## 2024-02-20 DIAGNOSIS — E03.9 HYPOTHYROIDISM, UNSPECIFIED TYPE: ICD-10-CM

## 2024-02-20 DIAGNOSIS — K21.9 GASTROESOPHAGEAL REFLUX DISEASE, UNSPECIFIED WHETHER ESOPHAGITIS PRESENT: ICD-10-CM

## 2024-02-20 LAB
ALBUMIN SERPL BCP-MCNC: 4 G/DL (ref 3.2–4.9)
ALBUMIN/GLOB SERPL: 1.3 G/DL
ALP SERPL-CCNC: 45 U/L (ref 30–99)
ALT SERPL-CCNC: 25 U/L (ref 2–50)
ANION GAP SERPL CALC-SCNC: 14 MMOL/L (ref 7–16)
AST SERPL-CCNC: 25 U/L (ref 12–45)
BASOPHILS # BLD AUTO: 0.6 % (ref 0–1.8)
BASOPHILS # BLD: 0.05 K/UL (ref 0–0.12)
BILIRUB SERPL-MCNC: 0.3 MG/DL (ref 0.1–1.5)
BUN SERPL-MCNC: 9 MG/DL (ref 8–22)
CALCIUM ALBUM COR SERPL-MCNC: 9.8 MG/DL (ref 8.5–10.5)
CALCIUM SERPL-MCNC: 9.8 MG/DL (ref 8.5–10.5)
CHLORIDE SERPL-SCNC: 106 MMOL/L (ref 96–112)
CO2 SERPL-SCNC: 23 MMOL/L (ref 20–33)
CREAT SERPL-MCNC: 0.83 MG/DL (ref 0.5–1.4)
EOSINOPHIL # BLD AUTO: 0.09 K/UL (ref 0–0.51)
EOSINOPHIL NFR BLD: 1.1 % (ref 0–6.9)
ERYTHROCYTE [DISTWIDTH] IN BLOOD BY AUTOMATED COUNT: 43.9 FL (ref 35.9–50)
EST. AVERAGE GLUCOSE BLD GHB EST-MCNC: 154 MG/DL
FLUAV RNA SPEC QL NAA+PROBE: NEGATIVE
FLUBV RNA SPEC QL NAA+PROBE: NEGATIVE
GFR SERPLBLD CREATININE-BSD FMLA CKD-EPI: 75 ML/MIN/1.73 M 2
GLOBULIN SER CALC-MCNC: 3.1 G/DL (ref 1.9–3.5)
GLUCOSE SERPL-MCNC: 121 MG/DL (ref 65–99)
HBA1C MFR BLD: 7 % (ref 4–5.6)
HCT VFR BLD AUTO: 40.1 % (ref 37–47)
HGB BLD-MCNC: 13.4 G/DL (ref 12–16)
IMM GRANULOCYTES # BLD AUTO: 0.02 K/UL (ref 0–0.11)
IMM GRANULOCYTES NFR BLD AUTO: 0.2 % (ref 0–0.9)
LYMPHOCYTES # BLD AUTO: 1.22 K/UL (ref 1–4.8)
LYMPHOCYTES NFR BLD: 14.4 % (ref 22–41)
MCH RBC QN AUTO: 31.2 PG (ref 27–33)
MCHC RBC AUTO-ENTMCNC: 33.4 G/DL (ref 32.2–35.5)
MCV RBC AUTO: 93.3 FL (ref 81.4–97.8)
MONOCYTES # BLD AUTO: 0.71 K/UL (ref 0–0.85)
MONOCYTES NFR BLD AUTO: 8.4 % (ref 0–13.4)
NEUTROPHILS # BLD AUTO: 6.4 K/UL (ref 1.82–7.42)
NEUTROPHILS NFR BLD: 75.3 % (ref 44–72)
NRBC # BLD AUTO: 0 K/UL
NRBC BLD-RTO: 0 /100 WBC (ref 0–0.2)
PLATELET # BLD AUTO: 291 K/UL (ref 164–446)
PMV BLD AUTO: 11.2 FL (ref 9–12.9)
POTASSIUM SERPL-SCNC: 4.7 MMOL/L (ref 3.6–5.5)
PROT SERPL-MCNC: 7.1 G/DL (ref 6–8.2)
RBC # BLD AUTO: 4.3 M/UL (ref 4.2–5.4)
RSV RNA SPEC QL NAA+PROBE: NEGATIVE
SARS-COV-2 RNA RESP QL NAA+PROBE: DETECTED
SODIUM SERPL-SCNC: 143 MMOL/L (ref 135–145)
TSH SERPL DL<=0.005 MIU/L-ACNC: 0.7 UIU/ML (ref 0.38–5.33)
WBC # BLD AUTO: 8.5 K/UL (ref 4.8–10.8)

## 2024-02-20 PROCEDURE — 99284 EMERGENCY DEPT VISIT MOD MDM: CPT

## 2024-02-20 PROCEDURE — 36415 COLL VENOUS BLD VENIPUNCTURE: CPT

## 2024-02-20 PROCEDURE — 0241U HCHG SARS-COV-2 COVID-19 NFCT DS RESP RNA 4 TRGT ED POC: CPT

## 2024-02-20 PROCEDURE — 83036 HEMOGLOBIN GLYCOSYLATED A1C: CPT

## 2024-02-20 PROCEDURE — 85025 COMPLETE CBC W/AUTO DIFF WBC: CPT

## 2024-02-20 PROCEDURE — 80053 COMPREHEN METABOLIC PANEL: CPT

## 2024-02-20 PROCEDURE — 84443 ASSAY THYROID STIM HORMONE: CPT

## 2024-02-20 RX ORDER — NIRMATRELVIR AND RITONAVIR 300-100 MG
3 KIT ORAL 2 TIMES DAILY
Qty: 30 EACH | Refills: 0 | Status: ACTIVE | OUTPATIENT
Start: 2024-02-20 | End: 2024-02-25

## 2024-02-20 RX ORDER — NIRMATRELVIR AND RITONAVIR 300-100 MG
3 KIT ORAL 2 TIMES DAILY
Qty: 301 EACH | Refills: 0 | Status: SHIPPED | OUTPATIENT
Start: 2024-02-20 | End: 2024-02-20

## 2024-02-20 ASSESSMENT — FIBROSIS 4 INDEX: FIB4 SCORE: 1.24

## 2024-02-21 NOTE — ED TRIAGE NOTES
Chief Complaint   Patient presents with    Flu Like Symptoms     Fever, cough, body aches, headache x1week.      72 y/o female ambulatory to triage with above complaint. Pt cough, body aches x1 week and a fever & headache since yesterday. -N/V/D.

## 2024-02-21 NOTE — ED PROVIDER NOTES
ER Provider Note    Scribed for Bill Culp Ii, M.d. by Mehran Arellano. 2/20/2024  9:25 PM    Primary Care Provider: London Stephenson M.D.    CHIEF COMPLAINT  Chief Complaint   Patient presents with    Flu Like Symptoms     Fever, cough, body aches, headache x1week.      EXTERNAL RECORDS REVIEWED  Outpatient Notes show the patient has a history of type 2 diabetes. She was last seen for this on 11/6/23 and her outpatient medications list was updated to include metformin, glipizide, and tirzepatide.    HPI/ROS  LIMITATION TO HISTORY   Select: : None  OUTSIDE HISTORIAN(S):  None    Bernadette Freitas is a 73 y.o. female with a history of diabetes who presents to the ED complaining of flu like symptoms onset 1 week ago. The patient reports associated cough, fever today, sneezing, headache, body ache, and congestion. She adds she took Tylenol at 7:00 PM tonight with some alleviation. She denies any shortness of breath, but notes her congestion has made it hard to breathe at times.     PAST MEDICAL HISTORY  Past Medical History:   Diagnosis Date    Depression     Diabetes (HCC)     Dyslipidemia     HTN (hypertension)     Hypercholesteremia     Hypothyroidism     Impaired fasting glucose     Vaginal candidiasis 4/3/2023       SURGICAL HISTORY  Past Surgical History:   Procedure Laterality Date    COLONOSCOPY WITH POLYP  10/30/2017    polyp asc colon- adenoma, diverticulosis sigmoid and dec colon,int hemorhoids    COLONOSCOPY WITH POLYP  11/19/2013    2 polyps-benign polypoid and adenomatous,severe diverticulosis entire colon, medium external hemorrhoids,    BREAST BIOPSY  3/13    left breast-benign    COLONOSCOPY  2008    polyp- tubular adenoma - [facility]    GASTROSCOPY  2008    acid reflux    CATARACT EXTRACTION WITH IOL Bilateral 03/03/2020, 5/4/2020    PRIMARY C SECTION      x1    THYROIDECTOMY TOTAL      toxic goiter       FAMILY HISTORY  Family History   Problem Relation Age of Onset    Cancer Mother          esophagus    Genitourinary () Problems Sister         renal failure on dialysis    Hyperlipidemia Sister     Hyperlipidemia Sister        SOCIAL HISTORY   reports that she quit smoking about 25 years ago. Her smoking use included cigarettes. She has never used smokeless tobacco. She reports that she does not currently use alcohol. She reports that she does not use drugs.    CURRENT MEDICATIONS  Previous Medications    ATORVASTATIN (LIPITOR) 40 MG TAB    Take 1 Tablet by mouth every evening.    BLOOD GLUCOSE MONITORING SUPPL DEVICE    Meter: Dispense Device of Insurance Preference. Sig. Use as directed for blood sugar monitoring. #1. NR.    CARVEDILOL (COREG) 12.5 MG TAB    TAKE 1 TABLET BY MOUTH TWICE DAILY WITH MEALS    CICLOPIROX (PENLAC) 8 % SOLUTION    Apply 1 Application topically every evening. Use rubbing alcohol to remove coat prior to re-applying    CONTINUOUS BLOOD GLUC  (FREESTYLE MURIEL 2 READER) DEVICE    Freestyle muriel reader. Check blood glucose 1-2 times per day.    CONTINUOUS BLOOD GLUC SENSOR (FREESTYLE MURIEL 2 SENSOR) MISC    Freestyle muriel sensor. Check blood glucose 1-2 times per day    ESCITALOPRAM (LEXAPRO) 10 MG TAB    Take 1 Tablet by mouth every day.    FAMOTIDINE (PEPCID) 20 MG TAB    Take 1 Tablet by mouth 2 times a day.    FLUTICASONE (FLONASE) 50 MCG/ACT NASAL SPRAY    SHAKE LIQUID  AND USE 2 SPRAYS IN EACH NOSTRIL ONCE DAILY    GLUCOSE BLOOD (ONE TOUCH ULTRA TEST) STRIP    USE ONCE DAILY BEFORE BREAKFAST AND AS NEEDED FOR SYMPTOMS OF HIGH OR LOW BLOOS SUGAR    LEVOTHYROXINE (SYNTHROID) 88 MCG TAB    Take 1 Tablet by mouth every morning on an empty stomach.    LIDOCAINE 4 % PATCH    Apply 1 Application topically 1 time a day as needed (pain).    LOSARTAN (COZAAR) 100 MG TAB    Take 1 Tablet by mouth every day.    METFORMIN (GLUCOPHAGE) 1000 MG TABLET    Take 1 Tablet by mouth 2 times a day with meals.    ONETOUCH DELICA LANCETS 33G MISC    USE ONCE DAILY BEFORE  "BREAKFAST AND AS NEEDED FOR SYMPTOMS OF HIGH OR LOW BLOOD SUGAR    ONETOUCH ULTRA STRIP    USE  STRIP TO CHECK GLUCOSE ONCE DAILY BEFORE  BREAKFAST  AND  AS  NEEDED  FOR  SYMPTOMS  OF  HIGH  OR  LOW  BLOOD  SUGARS    TIRZEPATIDE (MOUNJARO) 5 MG/0.5ML SOLUTION PEN-INJECTOR    Inject 5 mg under the skin every 7 days.       ALLERGIES  Amlodipine, Hydrochlorothiazide, and Penicillin g    PHYSICAL EXAM  BP (!) 150/93   Pulse 90   Temp 37.1 °C (98.7 °F) (Temporal)   Resp 16   Ht 1.549 m (5' 1\")   Wt 67 kg (147 lb 11.3 oz)   LMP 11/01/2006   SpO2 95%   BMI 27.91 kg/m²   Physical Exam  Vitals and nursing note reviewed.   Constitutional:       Appearance: Normal appearance.   HENT:      Head: Normocephalic.      Nose: Congestion present.      Mouth/Throat:      Mouth: Mucous membranes are dry.   Eyes:      Pupils: Pupils are equal, round, and reactive to light.   Cardiovascular:      Rate and Rhythm: Normal rate and regular rhythm.   Pulmonary:      Effort: Pulmonary effort is normal. No respiratory distress.      Breath sounds: Normal breath sounds. No wheezing or rales.   Neurological:      General: No focal deficit present.      Mental Status: She is alert.          DIAGNOSTIC STUDIES    Labs:   Results for orders placed or performed during the hospital encounter of 02/20/24   POC CoV-2, FLU A/B, RSV by PCR   Result Value Ref Range    POC Influenza A RNA, PCR Negative Negative    POC Influenza B RNA, PCR Negative Negative    POC RSV, by PCR Negative Negative    POC SARS-CoV-2, PCR DETECTED (AA)       COURSE & MEDICAL DECISION MAKING     ED Observation Status? No; Patient does not meet criteria for ED Observation.     INITIAL ASSESSMENT, COURSE AND PLAN  Care Narrative:     9:29 PM   Patient seen and examined at bedside. The patient is a 73 year old female with a history of diabetes who presents for evaluation of flu like symptoms with fever onset today.  Had some mild URI symptoms a couple days prior.  Per triage " protocol, POCT CoV-2, Flu A/B, RSV by PCR was ordered. I informed the patient her viral test is positive for COVID. I informed the patient she will be prescribed with paxlovid (multiple risk factors for worsening COVID ) and discharged home. No signs of respiratory compromise. I discussed plan for discharge and follow up as outlined below. The patient is stable for discharge at this time and will return for any new or worsening symptoms. Patient understands and agrees with my plan for discharge.     HTN/IDDM FOLLOW UP:  The patient has known hypertension and is being followed by their primary care doctor     PROBLEM LIST  #COVID-19    DISPOSITION AND DISCUSSIONS  I have discussed management of the patient with the following physicians and SAUNDRA's:  None    Discussion of management with other QHP or appropriate source(s): None       Barriers to care at this time, including but not limited to:  None .     The patient will return for new or worsening symptoms and is stable at the time of discharge.    DISPOSITION:  Patient will be discharged home in stable condition.    FOLLOW UP:  London Stephenson M.D.  [facility] [address] [address] [phone]    Schedule an appointment as soon as possible for a visit   As needed for minor concerns    [facility], Emergency Dept  [address] [facility] [address] [phone]    feeling worse, frequent vomiting, trouble breathing come back to the ER      OUTPATIENT MEDICATIONS:  New Prescriptions    NIRMATRELVIR&RITONAVIR 300/100 (PAXLOVID, 300/100,) 20 X 150 MG & 10 X 100MG TABLET THERAPY PACK    Take 3 Tablets by mouth 2 times a day for 5 days.        FINAL DIAGNOSIS  1. COVID-19       Mehran TORO), am scribing for, and in the presence of, DIXIE Calderon II.    Electronically signed by: Mehran Deal), 2/20/2024    Bill TORO II, M* personally performed the services described in this  documentation, as scribed by Mehran Arellano in my presence, and it is both accurate and complete.      The note accurately reflects work and decisions made by me.  Bill Culp II, M.D.  2/21/2024  12:05 AM

## 2024-02-22 RX ORDER — FAMOTIDINE 20 MG/1
20 TABLET, FILM COATED ORAL 2 TIMES DAILY
Qty: 180 TABLET | Refills: 3 | Status: SHIPPED | OUTPATIENT
Start: 2024-02-22

## 2024-02-22 NOTE — TELEPHONE ENCOUNTER
Received request via: Pharmacy    Was the patient seen in the last year in this department? Yes    Does the patient have an active prescription (recently filled or refills available) for medication(s) requested? No    Pharmacy Name: [facility]    Does the patient have [facility] and need 100 day supply (blood pressure, diabetes and cholesterol meds only)? Patient does not have SCP

## 2024-03-11 ENCOUNTER — OFFICE VISIT (OUTPATIENT)
Dept: MEDICAL GROUP | Facility: PHYSICIAN GROUP | Age: 74
End: 2024-03-11
Payer: COMMERCIAL

## 2024-03-11 VITALS
TEMPERATURE: 97.9 F | RESPIRATION RATE: 14 BRPM | SYSTOLIC BLOOD PRESSURE: 118 MMHG | DIASTOLIC BLOOD PRESSURE: 66 MMHG | HEART RATE: 82 BPM | BODY MASS INDEX: 27.56 KG/M2 | WEIGHT: 146 LBS | HEIGHT: 61 IN

## 2024-03-11 DIAGNOSIS — I10 ESSENTIAL HYPERTENSION: ICD-10-CM

## 2024-03-11 DIAGNOSIS — E78.5 DYSLIPIDEMIA: ICD-10-CM

## 2024-03-11 DIAGNOSIS — E03.9 HYPOTHYROIDISM, UNSPECIFIED TYPE: ICD-10-CM

## 2024-03-11 DIAGNOSIS — E11.69 TYPE 2 DIABETES MELLITUS WITH OTHER SPECIFIED COMPLICATION, WITHOUT LONG-TERM CURRENT USE OF INSULIN (HCC): ICD-10-CM

## 2024-03-11 DIAGNOSIS — Z12.31 ENCOUNTER FOR SCREENING MAMMOGRAM FOR BREAST CANCER: ICD-10-CM

## 2024-03-11 PROCEDURE — 3078F DIAST BP <80 MM HG: CPT | Performed by: FAMILY MEDICINE

## 2024-03-11 PROCEDURE — 3074F SYST BP LT 130 MM HG: CPT | Performed by: FAMILY MEDICINE

## 2024-03-11 PROCEDURE — 99214 OFFICE O/P EST MOD 30 MIN: CPT | Performed by: FAMILY MEDICINE

## 2024-03-11 RX ORDER — LOSARTAN POTASSIUM 50 MG/1
50 TABLET ORAL DAILY
Qty: 90 TABLET | Refills: 3 | Status: SHIPPED | OUTPATIENT
Start: 2024-03-11 | End: 2024-03-28 | Stop reason: SDUPTHER

## 2024-03-11 ASSESSMENT — PATIENT HEALTH QUESTIONNAIRE - PHQ9: CLINICAL INTERPRETATION OF PHQ2 SCORE: 0

## 2024-03-11 ASSESSMENT — FIBROSIS 4 INDEX: FIB4 SCORE: 1.24

## 2024-03-11 NOTE — PROGRESS NOTES
"CHIEF COMPLAINT / REASON FOR VISIT  Bernadette Freitas is a 73 y.o. female that presents today for   Chief Complaint   Patient presents with    Lab Results     HISTORY OF PRESENT ILLNESS  She reports than in the morning 2 hours after taking losartan she gets lightheaded and measures low blood pressures    OBJECTIVE     /66 (BP Location: Right arm, Patient Position: Sitting, BP Cuff Size: Adult)   Pulse 82   Temp 36.6 °C (97.9 °F) (Temporal)   Resp 14   Ht 1.549 m (5' 1\")   Wt 66.2 kg (146 lb)   LMP 11/01/2006   BMI 27.59 kg/m²      PHYSICAL EXAM  Constitutional: Sitting comfortably, in no acute distress, responds to questions appropriately.  Extremities: No lower extremity edema  Monofilament testing with a 10 gram force: sensation intact: intact bilaterally  Visual Inspection: Feet without maceration, ulcers, fissures.  Pedal pulses: intact bilaterally  Skin: Warm and dry, no rashes or lesions on face or exposed upper extremities    ASSESSMENT & PLAN  1. Type 2 diabetes mellitus with other specified complication, without long-term current use of insulin (HCC)  Chronic, controlled with last hemoglobin A1c 7.0.  Continue metformin 1000 mg twice daily and tirzepatide 5 mg weekly.  Follow-up in 3 months  - Diabetic Monofilament Lower Extremity Exam  - CBC WITH DIFFERENTIAL; Future  - Comp Metabolic Panel; Future  - HEMOGLOBIN A1C; Future    2. Essential hypertension  Chronic, well-controlled with losartan 100 mg daily and carvedilol 12.5 mg twice daily.  Unfortunately is getting low blood pressures at home so we will decrease losartan to 50 mg daily.  - losartan (COZAAR) 50 MG Tab; Take 1 Tablet by mouth every day.  Dispense: 90 Tablet; Refill: 3  - CBC WITH DIFFERENTIAL; Future  - Comp Metabolic Panel; Future    3. Encounter for screening mammogram for breast cancer  - MA-SCREENING MAMMO BILAT W/TOMOSYNTHESIS W/CAD; Future    4. Dyslipidemia  Chronic, controlled with atorvastatin 40 mg daily.  Continue " current therapy.  - Lipid Profile; Future    5. Hypothyroidism, unspecified type  Chronic, controlled with levothyroxine 88 mcg daily, continue current therapy  - TSH WITH REFLEX TO FT4; Future    Follow-up in 3 months

## 2024-03-14 ENCOUNTER — HOSPITAL ENCOUNTER (OUTPATIENT)
Dept: RADIOLOGY | Facility: MEDICAL CENTER | Age: 74
End: 2024-03-14
Payer: COMMERCIAL

## 2024-03-19 ENCOUNTER — HOSPITAL ENCOUNTER (OUTPATIENT)
Dept: RADIOLOGY | Facility: MEDICAL CENTER | Age: 74
End: 2024-03-19
Attending: FAMILY MEDICINE
Payer: COMMERCIAL

## 2024-03-19 DIAGNOSIS — Z12.31 ENCOUNTER FOR SCREENING MAMMOGRAM FOR BREAST CANCER: ICD-10-CM

## 2024-03-19 PROCEDURE — 77067 SCR MAMMO BI INCL CAD: CPT

## 2024-03-26 ENCOUNTER — PATIENT MESSAGE (OUTPATIENT)
Dept: MEDICAL GROUP | Facility: PHYSICIAN GROUP | Age: 74
End: 2024-03-26
Payer: COMMERCIAL

## 2024-03-26 DIAGNOSIS — I10 ESSENTIAL HYPERTENSION: ICD-10-CM

## 2024-03-26 DIAGNOSIS — E11.69 TYPE 2 DIABETES MELLITUS WITH OTHER SPECIFIED COMPLICATION, WITHOUT LONG-TERM CURRENT USE OF INSULIN (HCC): ICD-10-CM

## 2024-03-26 NOTE — PATIENT COMMUNICATION
1. Caller Name: Bernadette Freitas                          Call Back Number: [phone] (home)         How would the patient prefer to be contacted with a response: Phone call OK to leave a detailed message    To the patient and advised her that we are still waiting for response.

## 2024-03-26 NOTE — PATIENT COMMUNICATION
Phone Number Called: [phone] (home)       Call outcome: Left detailed message for patient. Informed to call back with any additional questions.    Message: Called the patient and informed her that communication has been forwarded to the provider.  Stated to her, as soon as we receive response we will give her a call.

## 2024-03-26 NOTE — PATIENT COMMUNICATION
VOICEMAIL  1. Caller Name: Bernadette Freitas                        Call Back Number: [phone] (home)       2. Message: Patient states that her Mounjaro medication is not working.  She has tried to reach out to the pharmacy and the company and has not received a response.  When she went about getting a refill on the medication the pharmacy states that they have no medication in stock.    3. Patient approves office to leave a detailed voicemail/MyChart message: yes

## 2024-03-28 RX ORDER — LOSARTAN POTASSIUM 50 MG/1
50 TABLET ORAL 2 TIMES DAILY
Qty: 180 TABLET | Refills: 3 | Status: SHIPPED | OUTPATIENT
Start: 2024-03-28

## 2024-03-30 DIAGNOSIS — E11.65 UNCONTROLLED TYPE 2 DIABETES MELLITUS WITH HYPERGLYCEMIA (HCC): ICD-10-CM

## 2024-03-30 DIAGNOSIS — F33.9 RECURRENT MAJOR DEPRESSIVE DISORDER, REMISSION STATUS UNSPECIFIED (HCC): ICD-10-CM

## 2024-03-30 DIAGNOSIS — E78.5 DYSLIPIDEMIA: ICD-10-CM

## 2024-04-01 RX ORDER — ATORVASTATIN CALCIUM 40 MG/1
40 TABLET, FILM COATED ORAL EVERY EVENING
Qty: 90 TABLET | Refills: 3 | Status: SHIPPED | OUTPATIENT
Start: 2024-04-01

## 2024-04-01 RX ORDER — ESCITALOPRAM OXALATE 10 MG/1
10 TABLET ORAL DAILY
Qty: 90 TABLET | Refills: 3 | Status: SHIPPED | OUTPATIENT
Start: 2024-04-01

## 2024-04-17 ENCOUNTER — OFFICE VISIT (OUTPATIENT)
Dept: MEDICAL GROUP | Facility: PHYSICIAN GROUP | Age: 74
End: 2024-04-17
Payer: COMMERCIAL

## 2024-04-17 ENCOUNTER — HOSPITAL ENCOUNTER (OUTPATIENT)
Facility: MEDICAL CENTER | Age: 74
End: 2024-04-17
Attending: FAMILY MEDICINE
Payer: COMMERCIAL

## 2024-04-17 VITALS
BODY MASS INDEX: 27.17 KG/M2 | OXYGEN SATURATION: 92 % | RESPIRATION RATE: 14 BRPM | WEIGHT: 143.9 LBS | HEART RATE: 82 BPM | SYSTOLIC BLOOD PRESSURE: 124 MMHG | TEMPERATURE: 98.4 F | HEIGHT: 61 IN | DIASTOLIC BLOOD PRESSURE: 72 MMHG

## 2024-04-17 DIAGNOSIS — N30.01 ACUTE CYSTITIS WITH HEMATURIA: ICD-10-CM

## 2024-04-17 DIAGNOSIS — N81.4 CYSTOCELE WITH PROLAPSE: ICD-10-CM

## 2024-04-17 DIAGNOSIS — E11.69 TYPE 2 DIABETES MELLITUS WITH OTHER SPECIFIED COMPLICATION, WITHOUT LONG-TERM CURRENT USE OF INSULIN (HCC): ICD-10-CM

## 2024-04-17 DIAGNOSIS — R30.0 DYSURIA: ICD-10-CM

## 2024-04-17 DIAGNOSIS — N81.4 UTEROVAGINAL PROLAPSE: ICD-10-CM

## 2024-04-17 LAB
APPEARANCE UR: NORMAL
BILIRUB UR STRIP-MCNC: NORMAL MG/DL
COLOR UR AUTO: NORMAL
FORWARD REASON: SPWHY: NORMAL
FORWARDED TO LAB: SPWHR: NORMAL
GLUCOSE UR STRIP.AUTO-MCNC: NORMAL MG/DL
KETONES UR STRIP.AUTO-MCNC: NORMAL MG/DL
LEUKOCYTE ESTERASE UR QL STRIP.AUTO: NORMAL
NITRITE UR QL STRIP.AUTO: NORMAL
PH UR STRIP.AUTO: 5.5 [PH] (ref 5–8)
PROT UR QL STRIP: 30 MG/DL
RBC UR QL AUTO: NORMAL
SP GR UR STRIP.AUTO: 1.02
SPECIMEN SENT (2ND): SPWT2: NORMAL
SPECIMEN SENT: SPWT1: NORMAL
UROBILINOGEN UR STRIP-MCNC: 0.2 MG/DL

## 2024-04-17 PROCEDURE — 3074F SYST BP LT 130 MM HG: CPT | Performed by: FAMILY MEDICINE

## 2024-04-17 PROCEDURE — 99214 OFFICE O/P EST MOD 30 MIN: CPT | Performed by: FAMILY MEDICINE

## 2024-04-17 PROCEDURE — 3078F DIAST BP <80 MM HG: CPT | Performed by: FAMILY MEDICINE

## 2024-04-17 PROCEDURE — 81002 URINALYSIS NONAUTO W/O SCOPE: CPT | Performed by: FAMILY MEDICINE

## 2024-04-17 RX ORDER — NITROFURANTOIN 25; 75 MG/1; MG/1
100 CAPSULE ORAL 2 TIMES DAILY
Qty: 14 CAPSULE | Refills: 0 | Status: SHIPPED | OUTPATIENT
Start: 2024-04-17 | End: 2024-04-24

## 2024-04-17 ASSESSMENT — FIBROSIS 4 INDEX: FIB4 SCORE: 1.24

## 2024-04-17 NOTE — PROGRESS NOTES
"CHIEF COMPLAINT / REASON FOR VISIT  Bernadette Freitas is a 73 y.o. female that presents today for   Chief Complaint   Patient presents with    UTI     Urgency,      HISTORY OF PRESENT ILLNESS  Dysuria, increased frequency, and urinary urgency. Also suprapubic pain. Couple days duration. NO fevers or chills    Feels lump in vagina    OBJECTIVE     /72 (BP Location: Left arm, Patient Position: Sitting, BP Cuff Size: Large adult)   Pulse 82   Temp 36.9 °C (98.4 °F) (Temporal)   Resp 14   Ht 1.549 m (5' 1\")   Wt 65.3 kg (143 lb 14.4 oz)   LMP 2006   SpO2 92%   BMI 27.19 kg/m²      PHYSICAL EXAM  Constitutional: Sitting comfortably, in no acute distress, responds to questions appropriately.  Back: No CVA tenderness to percussion bilaterally  : Normal external female genitalia, anterior vaginal wall bulge approximately 2 to 3 cm diameter which protrudes into vaginal canal  Skin: Warm and dry, no rashes or lesions on face or exposed upper extremities    ASSESSMENT & PLAN  1. Dysuria  2. Acute cystitis with hematuria  Symptoms and point-of-care urinalysis consistent with acute cystitis with hematuria.  UA shows large leukocytes, positive nitrates, large blood.  Will treat empirically with Macrobid 7-day course.  Obtain urine culture for speciation and sensitivities  - POCT Urinalysis  - URINE CULTURE(NEW); Future  - nitrofurantoin (MACROBID) 100 MG Cap; Take 1 Capsule by mouth 2 times a day for 7 days.  Dispense: 14 Capsule; Refill: 0    3. Type 2 diabetes mellitus with other specified complication, without long-term current use of insulin ([facility])  - Microalbumin Creat Ratio Urine - Clinic Collect; Future    4. Uterovaginal prolapse  5. Cystocele with prolapse  Suspect pelvic organ prolapse, specifically cystocele.  She is able to feel the bulge, and today has a urinary tract infection.  She has had 2 deliveries vaginal, 1 .  Recommend referral to urogynecology for further evaluation and " treatment  - Referral to Urogynecology

## 2024-04-22 LAB
ALBUMIN/CREAT UR: 188 MG/G CREAT (ref 0–29)
BACTERIA UR CULT: ABNORMAL
BACTERIA UR CULT: ABNORMAL
CREAT UR-MCNC: 74.8 MG/DL
MICROALBUMIN UR-MCNC: 140.8 UG/ML
OTHER ANTIBIOTIC SUSC ISLT: ABNORMAL

## 2024-05-08 ENCOUNTER — OFFICE VISIT (OUTPATIENT)
Dept: MEDICAL GROUP | Facility: PHYSICIAN GROUP | Age: 74
End: 2024-05-08
Payer: COMMERCIAL

## 2024-05-08 VITALS
SYSTOLIC BLOOD PRESSURE: 126 MMHG | HEIGHT: 61 IN | DIASTOLIC BLOOD PRESSURE: 74 MMHG | TEMPERATURE: 98.3 F | RESPIRATION RATE: 12 BRPM | BODY MASS INDEX: 27.66 KG/M2 | HEART RATE: 72 BPM | WEIGHT: 146.5 LBS | OXYGEN SATURATION: 96 %

## 2024-05-08 DIAGNOSIS — J32.0 MAXILLARY SINUSITIS, CHRONIC: ICD-10-CM

## 2024-05-08 DIAGNOSIS — E78.5 DYSLIPIDEMIA: ICD-10-CM

## 2024-05-08 DIAGNOSIS — R30.0 DYSURIA: ICD-10-CM

## 2024-05-08 DIAGNOSIS — E11.69 TYPE 2 DIABETES MELLITUS WITH OTHER SPECIFIED COMPLICATION, WITHOUT LONG-TERM CURRENT USE OF INSULIN (HCC): ICD-10-CM

## 2024-05-08 LAB
APPEARANCE UR: CLEAR
BILIRUB UR STRIP-MCNC: NORMAL MG/DL
COLOR UR AUTO: YELLOW
GLUCOSE UR STRIP.AUTO-MCNC: NORMAL MG/DL
KETONES UR STRIP.AUTO-MCNC: NORMAL MG/DL
LEUKOCYTE ESTERASE UR QL STRIP.AUTO: NORMAL
NITRITE UR QL STRIP.AUTO: NORMAL
PH UR STRIP.AUTO: 5.5 [PH] (ref 5–8)
PROT UR QL STRIP: NORMAL MG/DL
RBC UR QL AUTO: NORMAL
SP GR UR STRIP.AUTO: 1.01
UROBILINOGEN UR STRIP-MCNC: 0.2 MG/DL

## 2024-05-08 PROCEDURE — 81002 URINALYSIS NONAUTO W/O SCOPE: CPT | Performed by: FAMILY MEDICINE

## 2024-05-08 PROCEDURE — 3078F DIAST BP <80 MM HG: CPT | Performed by: FAMILY MEDICINE

## 2024-05-08 PROCEDURE — 99214 OFFICE O/P EST MOD 30 MIN: CPT | Performed by: FAMILY MEDICINE

## 2024-05-08 PROCEDURE — 3074F SYST BP LT 130 MM HG: CPT | Performed by: FAMILY MEDICINE

## 2024-05-08 RX ORDER — FAMOTIDINE 20 MG/1
TABLET, FILM COATED ORAL
COMMUNITY
End: 2024-05-08

## 2024-05-08 RX ORDER — LANCETS 30 GAUGE
EACH MISCELLANEOUS
Qty: 100 EACH | Refills: 3 | Status: SHIPPED | OUTPATIENT
Start: 2024-05-08

## 2024-05-08 ASSESSMENT — FIBROSIS 4 INDEX: FIB4 SCORE: 1.24

## 2024-05-08 NOTE — PROGRESS NOTES
"CHIEF COMPLAINT / REASON FOR VISIT  Bernadette Freitas is a 73 y.o. female that presents today for   Chief Complaint   Patient presents with    UTI     HISTORY OF PRESENT ILLNESS  Recurrent right maxillary sinusitis for years.  Pain and pressure in only the right maxillary sinus.  Uses Flonase without significant benefit.  No fevers or chills.      Since her last urinary tract infection she feels better but still has a little irritation when she urinates    OBJECTIVE   /74 (BP Location: Left arm, Patient Position: Sitting, BP Cuff Size: Adult)   Pulse 72   Temp 36.8 °C (98.3 °F) (Temporal)   Resp 12   Ht 1.549 m (5' 1\")   Wt 66.5 kg (146 lb 8 oz)   LMP 11/01/2006   SpO2 96%   BMI 27.68 kg/m²      PHYSICAL EXAM  Constitutional: Sitting comfortably, in no acute distress, responds to questions appropriately.  Skin: Warm and dry, no rashes or lesions on face or exposed upper extremities    ASSESSMENT & PLAN  1. Dysuria  Urinalysis today completely normal.  May still be having some irritation from her pelvic organ prolapse, suspected cystocele.  Provided information for urogyn referral  - POCT Urinalysis    2. Maxillary sinusitis, chronic  Chronic, recurrent right-sided maxillary sinusitis.  Given same location of right maxillary sinus suspect probable anatomic abnormality.  Obtain CT sinuses and refer to ENT  - CT-MAXILLOFACIAL WITH PLUS RECONS; Future  - Referral to ENT    3. Type 2 diabetes mellitus with other specified complication, without long-term current use of insulin ([facility])  Request new glucometer and supplies, old one is not working very well  - HEMOGLOBIN A1C; Future  - Comp Metabolic Panel; Future  - Blood Glucose Meter Kit; Test blood sugar as recommended by provider. One Touch Ultra 2 blood glucose monitoring kit.  Dispense: 1 Kit; Refill: 0  - Blood Glucose Test Strips; Use one One Touch Ultra 2 strip to test blood sugar once daily early morning before first meal.  Dispense: 100 Strip; " Refill: 3  - Lancets; Use one One Touch Ultra 2 lancet to test blood sugar once daily early morning before first meal.  Dispense: 100 Each; Refill: 3    4. Dyslipidemia  - Lipid Profile; Future

## 2024-05-17 ENCOUNTER — DOCUMENTATION (OUTPATIENT)
Dept: HEALTH INFORMATION MANAGEMENT | Facility: OTHER | Age: 74
End: 2024-05-17
Payer: COMMERCIAL

## 2024-05-25 ENCOUNTER — HOSPITAL ENCOUNTER (OUTPATIENT)
Dept: RADIOLOGY | Facility: MEDICAL CENTER | Age: 74
End: 2024-05-25
Attending: FAMILY MEDICINE
Payer: COMMERCIAL

## 2024-05-25 DIAGNOSIS — J32.0 MAXILLARY SINUSITIS, CHRONIC: ICD-10-CM

## 2024-06-04 DIAGNOSIS — J32.0 MAXILLARY SINUSITIS, CHRONIC: ICD-10-CM

## 2024-06-04 RX ORDER — FLUTICASONE PROPIONATE 50 MCG
SPRAY, SUSPENSION (ML) NASAL
Qty: 16 G | Refills: 0 | Status: SHIPPED | OUTPATIENT
Start: 2024-06-04 | End: 2024-06-17 | Stop reason: SDUPTHER

## 2024-06-04 NOTE — TELEPHONE ENCOUNTER
Received request via: Pharmacy    Was the patient seen in the last year in this department? Yes    Does the patient have an active prescription (recently filled or refills available) for medication(s) requested? No    Pharmacy Name: [id number] - [place] - [address] [facility] [address] [facility]     Does the patient have [facility] and need 100 day supply (blood pressure, diabetes and cholesterol meds only)? Patient does not have SCP

## 2024-06-08 ENCOUNTER — HOSPITAL ENCOUNTER (OUTPATIENT)
Dept: LAB | Facility: MEDICAL CENTER | Age: 74
End: 2024-06-08
Attending: FAMILY MEDICINE
Payer: COMMERCIAL

## 2024-06-08 DIAGNOSIS — E11.69 TYPE 2 DIABETES MELLITUS WITH OTHER SPECIFIED COMPLICATION, WITHOUT LONG-TERM CURRENT USE OF INSULIN (HCC): ICD-10-CM

## 2024-06-08 DIAGNOSIS — E78.5 DYSLIPIDEMIA: ICD-10-CM

## 2024-06-08 LAB
ALBUMIN SERPL BCP-MCNC: 4.3 G/DL (ref 3.2–4.9)
ALBUMIN/GLOB SERPL: 1.7 G/DL
ALP SERPL-CCNC: 42 U/L (ref 30–99)
ALT SERPL-CCNC: 16 U/L (ref 2–50)
ANION GAP SERPL CALC-SCNC: 14 MMOL/L (ref 7–16)
AST SERPL-CCNC: 19 U/L (ref 12–45)
BILIRUB SERPL-MCNC: 0.3 MG/DL (ref 0.1–1.5)
BUN SERPL-MCNC: 12 MG/DL (ref 8–22)
CALCIUM ALBUM COR SERPL-MCNC: 9.5 MG/DL (ref 8.5–10.5)
CALCIUM SERPL-MCNC: 9.7 MG/DL (ref 8.5–10.5)
CHLORIDE SERPL-SCNC: 104 MMOL/L (ref 96–112)
CHOLEST SERPL-MCNC: 154 MG/DL (ref 100–199)
CO2 SERPL-SCNC: 23 MMOL/L (ref 20–33)
CREAT SERPL-MCNC: 0.79 MG/DL (ref 0.5–1.4)
EST. AVERAGE GLUCOSE BLD GHB EST-MCNC: 154 MG/DL
FASTING STATUS PATIENT QL REPORTED: NORMAL
GFR SERPLBLD CREATININE-BSD FMLA CKD-EPI: 79 ML/MIN/1.73 M 2
GLOBULIN SER CALC-MCNC: 2.6 G/DL (ref 1.9–3.5)
GLUCOSE SERPL-MCNC: 125 MG/DL (ref 65–99)
HBA1C MFR BLD: 7 % (ref 4–5.6)
HDLC SERPL-MCNC: 53 MG/DL
LDLC SERPL CALC-MCNC: 63 MG/DL
POTASSIUM SERPL-SCNC: 4.8 MMOL/L (ref 3.6–5.5)
PROT SERPL-MCNC: 6.9 G/DL (ref 6–8.2)
SODIUM SERPL-SCNC: 141 MMOL/L (ref 135–145)
TRIGL SERPL-MCNC: 190 MG/DL (ref 0–149)

## 2024-06-08 PROCEDURE — 80053 COMPREHEN METABOLIC PANEL: CPT

## 2024-06-08 PROCEDURE — 36415 COLL VENOUS BLD VENIPUNCTURE: CPT

## 2024-06-08 PROCEDURE — 80061 LIPID PANEL: CPT

## 2024-06-08 PROCEDURE — 83036 HEMOGLOBIN GLYCOSYLATED A1C: CPT

## 2024-06-11 ENCOUNTER — OFFICE VISIT (OUTPATIENT)
Dept: MEDICAL GROUP | Facility: PHYSICIAN GROUP | Age: 74
End: 2024-06-11
Payer: COMMERCIAL

## 2024-06-11 VITALS
DIASTOLIC BLOOD PRESSURE: 68 MMHG | OXYGEN SATURATION: 94 % | SYSTOLIC BLOOD PRESSURE: 122 MMHG | HEIGHT: 61 IN | BODY MASS INDEX: 27.38 KG/M2 | RESPIRATION RATE: 18 BRPM | TEMPERATURE: 97.6 F | WEIGHT: 145 LBS | HEART RATE: 77 BPM

## 2024-06-11 DIAGNOSIS — N81.4 CYSTOCELE WITH PROLAPSE: ICD-10-CM

## 2024-06-11 DIAGNOSIS — E11.69 TYPE 2 DIABETES MELLITUS WITH OTHER SPECIFIED COMPLICATION, WITHOUT LONG-TERM CURRENT USE OF INSULIN (HCC): ICD-10-CM

## 2024-06-11 PROCEDURE — 99213 OFFICE O/P EST LOW 20 MIN: CPT | Performed by: FAMILY MEDICINE

## 2024-06-11 PROCEDURE — 3074F SYST BP LT 130 MM HG: CPT | Performed by: FAMILY MEDICINE

## 2024-06-11 PROCEDURE — 3078F DIAST BP <80 MM HG: CPT | Performed by: FAMILY MEDICINE

## 2024-06-11 ASSESSMENT — FIBROSIS 4 INDEX: FIB4 SCORE: 1.18

## 2024-06-11 NOTE — PROGRESS NOTES
"CHIEF COMPLAINT / REASON FOR VISIT  Bernadette Freitas is a 73 y.o. female that presents today for   Chief Complaint   Patient presents with    Lab Results    Medication Refill     HISTORY OF PRESENT ILLNESS  Was prescribed pessary by GYN.    OBJECTIVE   /68 (BP Location: Right arm, Patient Position: Sitting, BP Cuff Size: Adult)   Pulse 77   Temp 36.4 °C (97.6 °F) (Temporal)   Resp 18   Ht 1.549 m (5' 1\")   Wt 65.8 kg (145 lb)   LMP 11/01/2006   SpO2 94%   BMI 27.40 kg/m²      PHYSICAL EXAM  Constitutional: Sitting comfortably, in no acute distress, responds to questions appropriately.  Skin: Warm and dry, no rashes or lesions on face or exposed upper extremities    ASSESSMENT & PLAN  1. Type 2 diabetes mellitus with other specified complication, without long-term current use of insulin (HCC)  Chronic, controlled with hemoglobin A1c 7.0.  Continue metformin 1000 mg twice daily, Trulicity 0.75 mg weekly.  Follow-up in 6 months with new primary care provider    2. Cystocele with prolapse  Recommended pessary by gynecology.      "

## 2024-06-17 ENCOUNTER — TELEPHONE (OUTPATIENT)
Dept: HEALTH INFORMATION MANAGEMENT | Facility: OTHER | Age: 74
End: 2024-06-17

## 2024-06-17 DIAGNOSIS — E11.65 UNCONTROLLED TYPE 2 DIABETES MELLITUS WITH HYPERGLYCEMIA (HCC): ICD-10-CM

## 2024-06-17 DIAGNOSIS — E03.9 HYPOTHYROIDISM, UNSPECIFIED TYPE: ICD-10-CM

## 2024-06-17 DIAGNOSIS — K21.9 GASTROESOPHAGEAL REFLUX DISEASE, UNSPECIFIED WHETHER ESOPHAGITIS PRESENT: ICD-10-CM

## 2024-06-17 DIAGNOSIS — I10 ESSENTIAL HYPERTENSION: ICD-10-CM

## 2024-06-17 DIAGNOSIS — J32.0 MAXILLARY SINUSITIS, CHRONIC: ICD-10-CM

## 2024-06-17 DIAGNOSIS — E78.5 DYSLIPIDEMIA: ICD-10-CM

## 2024-06-17 DIAGNOSIS — F33.9 RECURRENT MAJOR DEPRESSIVE DISORDER, REMISSION STATUS UNSPECIFIED (HCC): ICD-10-CM

## 2024-06-17 RX ORDER — FAMOTIDINE 20 MG/1
20 TABLET, FILM COATED ORAL 2 TIMES DAILY
Qty: 180 TABLET | Refills: 0 | Status: SHIPPED | OUTPATIENT
Start: 2024-06-17

## 2024-06-17 RX ORDER — LEVOTHYROXINE SODIUM 88 UG/1
88 TABLET ORAL
Qty: 90 TABLET | Refills: 0 | Status: SHIPPED | OUTPATIENT
Start: 2024-06-17

## 2024-06-17 RX ORDER — LOSARTAN POTASSIUM 50 MG/1
50 TABLET ORAL 2 TIMES DAILY
Qty: 180 TABLET | Refills: 0 | Status: SHIPPED | OUTPATIENT
Start: 2024-06-17

## 2024-06-17 RX ORDER — CARVEDILOL 12.5 MG/1
12.5 TABLET ORAL 2 TIMES DAILY WITH MEALS
Qty: 180 TABLET | Refills: 0 | Status: SHIPPED | OUTPATIENT
Start: 2024-06-17

## 2024-06-17 RX ORDER — ATORVASTATIN CALCIUM 40 MG/1
40 TABLET, FILM COATED ORAL EVERY EVENING
Qty: 90 TABLET | Refills: 0 | Status: SHIPPED | OUTPATIENT
Start: 2024-06-17

## 2024-06-17 RX ORDER — FLUTICASONE PROPIONATE 50 MCG
SPRAY, SUSPENSION (ML) NASAL
Qty: 16 G | Refills: 0 | Status: SHIPPED | OUTPATIENT
Start: 2024-06-17

## 2024-06-17 RX ORDER — ESCITALOPRAM OXALATE 10 MG/1
10 TABLET ORAL DAILY
Qty: 90 TABLET | Refills: 0 | Status: SHIPPED | OUTPATIENT
Start: 2024-06-17

## 2024-06-17 NOTE — TELEPHONE ENCOUNTER
Received request via: Patient    Was the patient seen in the last year in this department?yes    Does the patient have an active prescription (recently filled or refills available) for medication(s) requested? No        Pharmacy Name: [facility]    Does the patient have [facility] and need 100 day supply (blood pressure, diabetes and cholesterol meds only)? Patient does not have [facility]    Patient request:  Patient called for refills on these medications as well as Trulicity.75mg, which I did not see.         Thank You,  [facility] Team

## 2024-06-17 NOTE — TELEPHONE ENCOUNTER
Requested Prescriptions     Pending Prescriptions Disp Refills    metformin (GLUCOPHAGE) 1000 MG tablet 180 Tablet 0     Sig: Take 1 Tablet by mouth 2 times a day with meals.    levothyroxine (SYNTHROID) 88 MCG Tab 90 Tablet 0     Sig: Take 1 Tablet by mouth every morning on an empty stomach.    losartan (COZAAR) 50 MG Tab 180 Tablet 0     Sig: Take 1 Tablet by mouth 2 times a day.    famotidine (PEPCID) 20 MG Tab 180 Tablet 0     Sig: Take 1 Tablet by mouth 2 times a day.    carvedilol (COREG) 12.5 MG Tab 180 Tablet 0     Sig: Take 1 Tablet by mouth 2 times a day with meals.    fluticasone (FLONASE) 50 MCG/ACT nasal spray 16 g 0     Sig: Use 2 spray(s) in each nostril once daily    escitalopram (LEXAPRO) 10 MG Tab 90 Tablet 0     Sig: Take 1 Tablet by mouth every day.    atorvastatin (LIPITOR) 40 MG Tab 90 Tablet 0     Sig: Take 1 Tablet by mouth every evening.    Please have patient establish care with a new PCP for future refills as Dr. Stephenson is no longer with [facility].     Thanks  Jaylene Farias M.D.

## 2024-08-23 ENCOUNTER — TELEPHONE (OUTPATIENT)
Dept: HEALTH INFORMATION MANAGEMENT | Facility: OTHER | Age: 74
End: 2024-08-23
Payer: COMMERCIAL

## 2024-09-18 ENCOUNTER — HOSPITAL ENCOUNTER (OUTPATIENT)
Dept: LAB | Facility: MEDICAL CENTER | Age: 74
End: 2024-09-18
Attending: FAMILY MEDICINE
Payer: COMMERCIAL

## 2024-09-18 ENCOUNTER — OFFICE VISIT (OUTPATIENT)
Dept: MEDICAL GROUP | Facility: PHYSICIAN GROUP | Age: 74
End: 2024-09-18
Payer: COMMERCIAL

## 2024-09-18 VITALS
DIASTOLIC BLOOD PRESSURE: 76 MMHG | RESPIRATION RATE: 16 BRPM | BODY MASS INDEX: 28.13 KG/M2 | SYSTOLIC BLOOD PRESSURE: 132 MMHG | HEIGHT: 61 IN | HEART RATE: 68 BPM | TEMPERATURE: 97.4 F | WEIGHT: 149 LBS | OXYGEN SATURATION: 96 %

## 2024-09-18 DIAGNOSIS — R80.9 MICROALBUMINURIA: ICD-10-CM

## 2024-09-18 DIAGNOSIS — Z00.00 HEALTHCARE MAINTENANCE: ICD-10-CM

## 2024-09-18 DIAGNOSIS — Z23 NEED FOR VACCINATION: ICD-10-CM

## 2024-09-18 DIAGNOSIS — F33.9 RECURRENT MAJOR DEPRESSIVE DISORDER, REMISSION STATUS UNSPECIFIED (HCC): ICD-10-CM

## 2024-09-18 DIAGNOSIS — E03.9 HYPOTHYROIDISM, UNSPECIFIED TYPE: ICD-10-CM

## 2024-09-18 DIAGNOSIS — E11.65 UNCONTROLLED TYPE 2 DIABETES MELLITUS WITH HYPERGLYCEMIA (HCC): ICD-10-CM

## 2024-09-18 DIAGNOSIS — I10 ESSENTIAL HYPERTENSION: ICD-10-CM

## 2024-09-18 DIAGNOSIS — I10 PRIMARY HYPERTENSION: ICD-10-CM

## 2024-09-18 DIAGNOSIS — E11.69 TYPE 2 DIABETES MELLITUS WITH OTHER SPECIFIED COMPLICATION, WITHOUT LONG-TERM CURRENT USE OF INSULIN (HCC): ICD-10-CM

## 2024-09-18 DIAGNOSIS — E78.5 DYSLIPIDEMIA: ICD-10-CM

## 2024-09-18 DIAGNOSIS — J32.0 MAXILLARY SINUSITIS, CHRONIC: ICD-10-CM

## 2024-09-18 DIAGNOSIS — K21.9 GASTROESOPHAGEAL REFLUX DISEASE, UNSPECIFIED WHETHER ESOPHAGITIS PRESENT: ICD-10-CM

## 2024-09-18 DIAGNOSIS — E66.9 OBESITY (BMI 30-39.9): ICD-10-CM

## 2024-09-18 LAB
ALBUMIN SERPL BCP-MCNC: 4.3 G/DL (ref 3.2–4.9)
ALBUMIN/GLOB SERPL: 1.6 G/DL
ALP SERPL-CCNC: 47 U/L (ref 30–99)
ALT SERPL-CCNC: 29 U/L (ref 2–50)
ANION GAP SERPL CALC-SCNC: 15 MMOL/L (ref 7–16)
AST SERPL-CCNC: 25 U/L (ref 12–45)
BILIRUB SERPL-MCNC: 0.4 MG/DL (ref 0.1–1.5)
BUN SERPL-MCNC: 11 MG/DL (ref 8–22)
CALCIUM ALBUM COR SERPL-MCNC: 9.4 MG/DL (ref 8.5–10.5)
CALCIUM SERPL-MCNC: 9.6 MG/DL (ref 8.5–10.5)
CHLORIDE SERPL-SCNC: 102 MMOL/L (ref 96–112)
CHOLEST SERPL-MCNC: 148 MG/DL (ref 100–199)
CO2 SERPL-SCNC: 22 MMOL/L (ref 20–33)
CREAT SERPL-MCNC: 0.83 MG/DL (ref 0.5–1.4)
ERYTHROCYTE [DISTWIDTH] IN BLOOD BY AUTOMATED COUNT: 43.8 FL (ref 35.9–50)
FASTING STATUS PATIENT QL REPORTED: NORMAL
GFR SERPLBLD CREATININE-BSD FMLA CKD-EPI: 74 ML/MIN/1.73 M 2
GLOBULIN SER CALC-MCNC: 2.7 G/DL (ref 1.9–3.5)
GLUCOSE SERPL-MCNC: 122 MG/DL (ref 65–99)
HBA1C MFR BLD: 7.8 % (ref ?–5.8)
HCT VFR BLD AUTO: 38.4 % (ref 37–47)
HDLC SERPL-MCNC: 54 MG/DL
HGB BLD-MCNC: 12.8 G/DL (ref 12–16)
LDLC SERPL CALC-MCNC: 63 MG/DL
MCH RBC QN AUTO: 31.4 PG (ref 27–33)
MCHC RBC AUTO-ENTMCNC: 33.3 G/DL (ref 32.2–35.5)
MCV RBC AUTO: 94.3 FL (ref 81.4–97.8)
PLATELET # BLD AUTO: 303 K/UL (ref 164–446)
PMV BLD AUTO: 10.7 FL (ref 9–12.9)
POCT INT CON NEG: NEGATIVE
POCT INT CON POS: POSITIVE
POTASSIUM SERPL-SCNC: 4.7 MMOL/L (ref 3.6–5.5)
PROT SERPL-MCNC: 7 G/DL (ref 6–8.2)
RBC # BLD AUTO: 4.07 M/UL (ref 4.2–5.4)
SODIUM SERPL-SCNC: 139 MMOL/L (ref 135–145)
T4 FREE SERPL-MCNC: 1.63 NG/DL (ref 0.93–1.7)
TRIGL SERPL-MCNC: 154 MG/DL (ref 0–149)
TSH SERPL DL<=0.005 MIU/L-ACNC: 0.1 UIU/ML (ref 0.38–5.33)
WBC # BLD AUTO: 6.8 K/UL (ref 4.8–10.8)

## 2024-09-18 PROCEDURE — 85027 COMPLETE CBC AUTOMATED: CPT

## 2024-09-18 PROCEDURE — 36415 COLL VENOUS BLD VENIPUNCTURE: CPT

## 2024-09-18 PROCEDURE — 3078F DIAST BP <80 MM HG: CPT | Performed by: FAMILY MEDICINE

## 2024-09-18 PROCEDURE — 90471 IMMUNIZATION ADMIN: CPT | Performed by: FAMILY MEDICINE

## 2024-09-18 PROCEDURE — 90715 TDAP VACCINE 7 YRS/> IM: CPT | Performed by: FAMILY MEDICINE

## 2024-09-18 PROCEDURE — 84439 ASSAY OF FREE THYROXINE: CPT

## 2024-09-18 PROCEDURE — 84443 ASSAY THYROID STIM HORMONE: CPT

## 2024-09-18 PROCEDURE — 80061 LIPID PANEL: CPT

## 2024-09-18 PROCEDURE — 99214 OFFICE O/P EST MOD 30 MIN: CPT | Mod: 25 | Performed by: FAMILY MEDICINE

## 2024-09-18 PROCEDURE — 3075F SYST BP GE 130 - 139MM HG: CPT | Performed by: FAMILY MEDICINE

## 2024-09-18 PROCEDURE — 80053 COMPREHEN METABOLIC PANEL: CPT

## 2024-09-18 PROCEDURE — 83036 HEMOGLOBIN GLYCOSYLATED A1C: CPT | Performed by: FAMILY MEDICINE

## 2024-09-18 RX ORDER — CARVEDILOL 12.5 MG/1
12.5 TABLET ORAL 2 TIMES DAILY WITH MEALS
Qty: 180 TABLET | Refills: 3 | Status: SHIPPED | OUTPATIENT
Start: 2024-09-18

## 2024-09-18 RX ORDER — ATORVASTATIN CALCIUM 40 MG/1
40 TABLET, FILM COATED ORAL EVERY EVENING
Qty: 90 TABLET | Refills: 3 | Status: SHIPPED | OUTPATIENT
Start: 2024-09-18

## 2024-09-18 RX ORDER — ESCITALOPRAM OXALATE 10 MG/1
10 TABLET ORAL DAILY
Qty: 90 TABLET | Refills: 3 | Status: SHIPPED | OUTPATIENT
Start: 2024-09-18

## 2024-09-18 RX ORDER — LOSARTAN POTASSIUM 50 MG/1
50 TABLET ORAL 2 TIMES DAILY
Qty: 180 TABLET | Refills: 3 | Status: SHIPPED | OUTPATIENT
Start: 2024-09-18

## 2024-09-18 RX ORDER — LEVOTHYROXINE SODIUM 88 UG/1
88 TABLET ORAL
Qty: 90 TABLET | Refills: 3 | Status: SHIPPED | OUTPATIENT
Start: 2024-09-18

## 2024-09-18 RX ORDER — FLUTICASONE PROPIONATE 50 MCG
SPRAY, SUSPENSION (ML) NASAL
Qty: 16 G | Refills: 3 | Status: SHIPPED | OUTPATIENT
Start: 2024-09-18

## 2024-09-18 RX ORDER — FAMOTIDINE 20 MG/1
20 TABLET, FILM COATED ORAL 2 TIMES DAILY
Qty: 180 TABLET | Refills: 3 | Status: SHIPPED | OUTPATIENT
Start: 2024-09-18

## 2024-09-18 ASSESSMENT — ENCOUNTER SYMPTOMS
COUGH: 0
PALPITATIONS: 0
CHILLS: 0
NAUSEA: 0
ABDOMINAL PAIN: 0
SHORTNESS OF BREATH: 0
VOMITING: 0
SORE THROAT: 0
FEVER: 0

## 2024-09-18 ASSESSMENT — FIBROSIS 4 INDEX: FIB4 SCORE: 1.19

## 2024-09-18 NOTE — PROGRESS NOTES
Verbal consent was acquired by the patient to use [employer] ambient listening note generation during this visit.    Subjective:     HPI:   History of Present Illness  The patient is a 74-year-old female who presents today to [facility]. She has a past medical history of hypertension, hypothyroidism, hyperlipidemia, depression, GERD, type 2 diabetes, and obesity. Her current medications include metformin, levothyroxine, losartan, famotidine, Coreg, Flonase, Lipitor, and dulaglutide. She underwent laboratory evaluations in June 2024 and February 2024. She is due for diabetic retinopathy screening and several vaccines, which will be discussed during today's visit.    She is aware that her diabetes management needs improvement. She administered Trulicity yesterday and has been on a 0.5 mg weekly dose for less than a year. She requests a complete blood test today as she has fasted and wishes to monitor her liver and kidney function every 4 months. She also wants to ensure her levothyroxine dosage is correct. She visits an ophthalmologist annually for diabetes testing, with her last visit in March 2023. She admits to consuming some sugar but tries to control her diet. She monitors her blood glucose levels at home twice a week and has sufficient diabetic supplies. Occasionally, her blood sugar drops below 100, but she manages it with juice or candies.    She is due for her tetanus vaccine since last year. She received her influenza vaccine last week at [facility]. She has not yet received her COVID-19 vaccine but plans to.     She experiences pain when raising her arm, which she believes may be due to her sleeping position.  Patient notes her pain is located along the anterior lateral shoulder and seems to be worse over the last few months.  Patient has not done any previous physical therapy or home exercise program.  Patient denies any specific injury fall or trauma.  No previous x-ray imaging MRI imaging or other  "treatment modalities.  Patient denies any previous injections or surgical interventions about the shoulder.  Patient reports the pain is minimal and she is able to tolerate but does note significant limitations in range of motion as well as associated pain. She has never used Voltaren cream.    She is on metformin 1000 mg twice daily, levothyroxine 88 mcg, losartan, and carvedilol for her blood pressure. She reports no lightheadedness or dizziness. She requires refills on all her medications. She takes famotidine for acid reflux and escitalopram for anxiety/depression. She feels her conditions are well managed with her current medications.     She does not use a continuous glucometer. She has not used the lidocaine patch.     She visits the dentist every 6 months and uses dentures.     She does not consume alcohol, use drugs, smoke tobacco, or use nicotine.  Review of Systems   Constitutional:  Negative for chills and fever.   HENT:  Negative for congestion and sore throat.    Respiratory:  Negative for cough and shortness of breath.    Cardiovascular:  Negative for chest pain and palpitations.   Gastrointestinal:  Negative for abdominal pain, nausea and vomiting.       Health Maintenance: Completed    Objective:     Exam:  /76 (BP Location: Right arm, Patient Position: Sitting, BP Cuff Size: Adult)   Pulse 68   Temp 36.3 °C (97.4 °F) (Temporal)   Resp 16   Ht 1.549 m (5' 1\")   Wt 67.6 kg (149 lb)   LMP 11/01/2006   SpO2 96%   BMI 28.15 kg/m²  Body mass index is 28.15 kg/m².    Physical Exam  Vitals reviewed.   Constitutional:       Appearance: Normal appearance.   HENT:      Head: Normocephalic and atraumatic.      Right Ear: External ear normal.      Left Ear: External ear normal.      Nose: Nose normal.   Cardiovascular:      Rate and Rhythm: Normal rate and regular rhythm.      Pulses: Normal pulses.      Heart sounds: Normal heart sounds. No murmur heard.  Pulmonary:      Effort: Pulmonary effort " is normal. No respiratory distress.      Breath sounds: Normal breath sounds. No wheezing.   Abdominal:      General: Abdomen is flat.      Palpations: Abdomen is soft.   Skin:     General: Skin is warm and dry.   Neurological:      Mental Status: She is alert and oriented to person, place, and time.   Psychiatric:         Mood and Affect: Mood normal.         Behavior: Behavior normal.       General: The patient is awake, alert, oriented. Dressed appropriately with good eye contact. No acute distress.   Examination of the LEFT shoulder: Tenderness to palpation of the anterolateral shoulder. 110 degrees forward flexion. 35 degrees external rotation. L3 internal rotation. 5-/5 Strength of the supraspinatus. 4/5 strength of the infraspinatus. 5/5 strength of the subscapularis. - Speeds. + Pan. - Bear Hug. - Belly Press. - Corona's.  Sensation is grossly intact.  2+ radial pulse.   Elbow: No gross tenderness to the medial or lateral epicondyl or radial head. No obvious deformity of the elbow.   Skin: No warmth, redness, heat or rashes.        Results  Laboratory Studies  A1c is 7.8. Microalbuminuria present.    Assessment & Plan:     1. Uncontrolled type 2 diabetes mellitus with hyperglycemia (HCC)  POCT Hemoglobin A1C    CBC WITHOUT DIFFERENTIAL    TSH WITH REFLEX TO FT4    Lipid Profile    Comp Metabolic Panel    metformin (GLUCOPHAGE) 1000 MG tablet      2. Healthcare maintenance  CBC WITHOUT DIFFERENTIAL    TSH WITH REFLEX TO FT4    Lipid Profile    Comp Metabolic Panel      3. Hypothyroidism, unspecified type  TSH WITH REFLEX TO FT4    levothyroxine (SYNTHROID) 88 MCG Tab      4. Need for vaccination  Tdap Vaccine =>6YO IM      5. Essential hypertension  losartan (COZAAR) 50 MG Tab      6. Maxillary sinusitis, chronic  fluticasone (FLONASE) 50 MCG/ACT nasal spray      7. Gastroesophageal reflux disease, unspecified whether esophagitis present  famotidine (PEPCID) 20 MG Tab      8. Recurrent major depressive  disorder, remission status unspecified (HCC)  escitalopram (LEXAPRO) 10 MG Tab      9. Type 2 diabetes mellitus with other specified complication, without long-term current use of insulin (HCC)  Dulaglutide 0.75 MG/0.5ML Solution Pen-injector      10. Dyslipidemia  atorvastatin (LIPITOR) 40 MG Tab      11. Primary hypertension        12. Obesity (BMI 30-39.9)        13. Microalbuminuria            Assessment & Plan  1. Type 2 Diabetes Mellitus.  Her A1c level has increased from 7 to 7.8, indicating a need for better glycemic control. The presence of microalbuminuria suggests uncontrolled diabetes and potential kidney complications. The dosage of Trulicity will be increased to 0.75 mg weekly after her current prescription is exhausted. She is advised to complete her 3-month supply before the dose adjustment. A 30-day prescription will be provided at that time, with the possibility of further increases based on her tolerance. Blood work will be conducted today. She is encouraged to continue her annual retinopathy screenings with her ophthalmologist.    2. Hypertension.  Her blood pressure is currently well-controlled at 132/76 mmHg. She will continue her current medications, including losartan and carvedilol. A 90-day supply of losartan and carvedilol with 3 refills will be provided.    3. Hypothyroidism.  She will continue her current dose of levothyroxine 88 mcg daily. A 90-day supply with 3 refills will be provided.    4. Hyperlipidemia.  She will continue her current dose of Lipitor. A 90-day supply with 3 refills will be provided.    5. Depression.  She will continue her current dose of escitalopram. A 90-day supply with 3 refills will be provided.    6. Gastroesophageal Reflux Disease (GERD).  She will continue her current dose of famotidine. A 90-day supply with 3 refills will be provided.    7. Obesity.  She is advised to monitor her diet and engage in regular physical activity to manage her weight.    8.  Health Maintenance.  She is due for her tetanus vaccine and has not yet received her shingles or COVID-19 vaccines. She will receive her tetanus vaccine today. It is recommended that she receive the shingles and COVID-19 vaccines in early October 2024. She is advised to inform me if she requires additional diabetic supplies.    9.  Rotator cuff tendinopathy  The pain appears to be due to an irritated rotator cuff. A printout of exercises for home stretches will be provided. If the pain intensifies, she may take Tylenol. Voltaren cream can be applied up to 4 times daily. If necessary, x-rays can be obtained and a referral to physical therapy can be made.        Return in about 3 months (around 12/18/2024).    Please note that this dictation was created using voice recognition software. I have made every reasonable attempt to correct obvious errors, but I expect that there are errors of grammar and possibly content that I did not discover before finalizing the note.    Carol Lawson MD  Family Medicine and Non - Operative Sports Medicine   [facility]

## 2024-09-19 RX ORDER — DULAGLUTIDE 1.5 MG/.5ML
0.5 INJECTION, SOLUTION SUBCUTANEOUS
Qty: 6 ML | Refills: 3 | Status: SHIPPED | OUTPATIENT
Start: 2024-09-19

## 2024-11-08 ENCOUNTER — OFFICE VISIT (OUTPATIENT)
Dept: URGENT CARE | Facility: PHYSICIAN GROUP | Age: 74
End: 2024-11-08
Payer: COMMERCIAL

## 2024-11-08 VITALS
HEART RATE: 100 BPM | TEMPERATURE: 100.2 F | BODY MASS INDEX: 29.07 KG/M2 | WEIGHT: 154 LBS | RESPIRATION RATE: 14 BRPM | DIASTOLIC BLOOD PRESSURE: 68 MMHG | OXYGEN SATURATION: 93 % | SYSTOLIC BLOOD PRESSURE: 126 MMHG | HEIGHT: 61 IN

## 2024-11-08 DIAGNOSIS — U07.1 COVID-19: ICD-10-CM

## 2024-11-08 DIAGNOSIS — R05.1 ACUTE COUGH: ICD-10-CM

## 2024-11-08 LAB
FLUAV RNA SPEC QL NAA+PROBE: NEGATIVE
FLUBV RNA SPEC QL NAA+PROBE: NEGATIVE
RSV RNA SPEC QL NAA+PROBE: NEGATIVE
S PYO DNA SPEC NAA+PROBE: NOT DETECTED
SARS-COV-2 RNA RESP QL NAA+PROBE: POSITIVE

## 2024-11-08 PROCEDURE — 99213 OFFICE O/P EST LOW 20 MIN: CPT

## 2024-11-08 PROCEDURE — 3078F DIAST BP <80 MM HG: CPT

## 2024-11-08 PROCEDURE — 87651 STREP A DNA AMP PROBE: CPT

## 2024-11-08 PROCEDURE — 0241U POCT CEPHEID COV-2, FLU A/B, RSV - PCR: CPT

## 2024-11-08 PROCEDURE — 3074F SYST BP LT 130 MM HG: CPT

## 2024-11-08 ASSESSMENT — ENCOUNTER SYMPTOMS
SPUTUM PRODUCTION: 0
PHOTOPHOBIA: 0
EYE REDNESS: 0
WEAKNESS: 0
EYE DISCHARGE: 0
FEVER: 1
NAUSEA: 1
SHORTNESS OF BREATH: 0
VOMITING: 0
SINUS PAIN: 0
ABDOMINAL PAIN: 0
DIARRHEA: 0
CHILLS: 1
HEADACHES: 1
SENSORY CHANGE: 0
DIZZINESS: 0
MYALGIAS: 1
PALPITATIONS: 0
BLURRED VISION: 0
DOUBLE VISION: 0
STRIDOR: 0
SORE THROAT: 1
COUGH: 1
NECK PAIN: 0
TINGLING: 0
WHEEZING: 0
EYE PAIN: 0

## 2024-11-08 ASSESSMENT — VISUAL ACUITY: OU: 1

## 2024-11-08 ASSESSMENT — FIBROSIS 4 INDEX: FIB4 SCORE: 1.12

## 2024-11-08 NOTE — PROGRESS NOTES
Subjective     Bernadette Freitas is a 74 y.o. female who presents with Cough (Congestion, fever, x2d )    HPI:   Bernadette is a 75yo female presenting for sore throat and cough x2 days. Reports associated nasal congestion, body aches, fever, nausea, and intermittent headache. Denies abdominal pain, vomiting, or diarrhea. Tmax 103.0. Denies shortness of breath or increased work of breathing. No neck pain or swelling. Denies dysphagia or difficulty managing oral secretions. No rash. She has attempted Tylenol for relief.        Review of Systems   Constitutional:  Positive for chills, fever and malaise/fatigue.   HENT:  Positive for congestion and sore throat. Negative for ear discharge, ear pain and sinus pain.    Eyes:  Negative for blurred vision, double vision, photophobia, pain, discharge and redness.   Respiratory:  Positive for cough. Negative for sputum production, shortness of breath, wheezing and stridor.    Cardiovascular:  Negative for chest pain, palpitations and leg swelling.   Gastrointestinal:  Positive for nausea. Negative for abdominal pain, diarrhea and vomiting.   Musculoskeletal:  Positive for myalgias. Negative for neck pain.   Skin:  Negative for rash.   Neurological:  Positive for headaches (intermittent). Negative for dizziness, tingling, sensory change and weakness.     Past Medical History:   Diagnosis Date    Depression     Diabetes (HCC)     Dyslipidemia     HTN (hypertension)     Hypercholesteremia     Hypothyroidism     Impaired fasting glucose     Vaginal candidiasis 4/3/2023     Past Surgical History:   Procedure Laterality Date    COLONOSCOPY WITH POLYP  10/30/2017    polyp asc colon- adenoma, diverticulosis sigmoid and dec colon,int hemorhoids    COLONOSCOPY WITH POLYP  11/19/2013    2 polyps-benign polypoid and adenomatous,severe diverticulosis entire colon, medium external hemorrhoids,    BREAST BIOPSY  3/13    left breast-benign    COLONOSCOPY  2008    polyp- tubular adenoma - GI  consultants    GASTROSCOPY  2008    acid reflux    CATARACT EXTRACTION WITH IOL Bilateral 03/03/2020, 5/4/2020    PRIMARY C SECTION      x1    THYROIDECTOMY TOTAL      toxic goiter     Allergies: Amlodipine, Hydrochlorothiazide, and Penicillin g     Current Outpatient Medications:     Nirmatrelvir&Ritonavir 300/100 20 x 150 MG & 10 x 100MG Tablet Therapy Pack, Take 300 mg nirmatrelvir (two 150 mg tablets) with 100 mg ritonavir (one 100 mg tablet) by mouth, with all three tablets taken together twice daily for 5 days., Disp: 30 Each, Rfl: 0    Dulaglutide (TRULICITY) 1.5 MG/0.5ML Solution Pen-injector, Inject 0.5 mL under the skin every 7 days., Disp: 6 mL, Rfl: 3    metformin (GLUCOPHAGE) 1000 MG tablet, Take 1 Tablet by mouth 2 times a day with meals., Disp: 180 Tablet, Rfl: 3    losartan (COZAAR) 50 MG Tab, Take 1 Tablet by mouth 2 times a day., Disp: 180 Tablet, Rfl: 3    levothyroxine (SYNTHROID) 88 MCG Tab, Take 1 Tablet by mouth every morning on an empty stomach., Disp: 90 Tablet, Rfl: 3    fluticasone (FLONASE) 50 MCG/ACT nasal spray, Use 2 spray(s) in each nostril once daily, Disp: 16 g, Rfl: 3    famotidine (PEPCID) 20 MG Tab, Take 1 Tablet by mouth 2 times a day., Disp: 180 Tablet, Rfl: 3    escitalopram (LEXAPRO) 10 MG Tab, Take 1 Tablet by mouth every day., Disp: 90 Tablet, Rfl: 3    carvedilol (COREG) 12.5 MG Tab, Take 1 Tablet by mouth 2 times a day with meals., Disp: 180 Tablet, Rfl: 3    atorvastatin (LIPITOR) 40 MG Tab, Take 1 Tablet by mouth every evening., Disp: 90 Tablet, Rfl: 3    Blood Glucose Meter Kit, Test blood sugar as recommended by provider. One Touch Ultra 2 blood glucose monitoring kit., Disp: 1 Kit, Rfl: 0    Blood Glucose Test Strips, Use one One Touch Ultra 2 strip to test blood sugar once daily early morning before first meal., Disp: 100 Strip, Rfl: 3    Lancets, Use one One Touch Ultra 2 lancet to test blood sugar once daily early morning before first meal., Disp: 100 Each, Rfl:  "3    VITAMIN D, CHOLECALCIFEROL, PO, Take  by mouth., Disp: , Rfl:     Lidocaine 4 % Patch, Apply 1 Application topically 1 time a day as needed (pain)., Disp: 30 Patch, Rfl: 3    ONETOUCH ULTRA strip, USE  STRIP TO CHECK GLUCOSE ONCE DAILY BEFORE  BREAKFAST  AND  AS  NEEDED  FOR  SYMPTOMS  OF  HIGH  OR  LOW  BLOOD  SUGARS, Disp: 150 Strip, Rfl: 1    glucose blood (ONE TOUCH ULTRA TEST) strip, USE ONCE DAILY BEFORE BREAKFAST AND AS NEEDED FOR SYMPTOMS OF HIGH OR LOW BLOOS SUGAR, Disp: 100 Strip, Rfl: 5     Social History     Tobacco Use    Smoking status: Former     Current packs/day: 0.00     Types: Cigarettes     Quit date: 1998     Years since quittin.0    Smokeless tobacco: Never    Tobacco comments:     quit , smoked 1-2 cig/day for 5 yrs   Vaping Use    Vaping status: Never Used   Substance Use Topics    Alcohol use: Not Currently     Comment: rare    Drug use: No     Family History   Problem Relation Age of Onset    Cancer Mother         esophagus    Genitourinary () Problems Sister         renal failure on dialysis    Hyperlipidemia Sister     Hyperlipidemia Sister      Medications, Allergies, and current problem list reviewed today in Epic.      Objective     /68 (BP Location: Right arm, Patient Position: Sitting, BP Cuff Size: Adult)   Pulse 100   Temp 37.9 °C (100.2 °F) (Temporal)   Resp 14   Ht 1.549 m (5' 1\")   Wt 69.9 kg (154 lb)   LMP 2006   SpO2 93%   BMI 29.10 kg/m²      Physical Exam  Vitals reviewed.   Constitutional:       General: She is not in acute distress.  HENT:      Head: No right periorbital erythema or left periorbital erythema.      Jaw: There is normal jaw occlusion. No tenderness, swelling, pain on movement or malocclusion.      Right Ear: Tympanic membrane, ear canal and external ear normal.      Left Ear: Tympanic membrane, ear canal and external ear normal.      Nose: Congestion present.      Right Sinus: No maxillary sinus tenderness or frontal " sinus tenderness.      Left Sinus: No maxillary sinus tenderness or frontal sinus tenderness.      Mouth/Throat:      Lips: No lesions.      Mouth: Mucous membranes are moist. No oral lesions or angioedema.      Tongue: No lesions. Tongue does not deviate from midline.      Palate: No mass and lesions.      Pharynx: Uvula midline. Posterior oropharyngeal erythema present. No oropharyngeal exudate or uvula swelling.   Eyes:      General: Vision grossly intact. Gaze aligned appropriately. No visual field deficit.     Extraocular Movements: Extraocular movements intact.      Conjunctiva/sclera: Conjunctivae normal.      Pupils: Pupils are equal, round, and reactive to light.      Right eye: Pupil is round, reactive and not sluggish.      Left eye: Pupil is round, reactive and not sluggish.      Funduscopic exam:     Right eye: Red reflex present.         Left eye: Red reflex present.  Neck:      Trachea: Trachea normal. No abnormal tracheal secretions or tracheal deviation.   Cardiovascular:      Rate and Rhythm: Normal rate and regular rhythm.      Pulses: Normal pulses.      Heart sounds: Normal heart sounds.   Pulmonary:      Effort: Pulmonary effort is normal. No tachypnea, accessory muscle usage, prolonged expiration, respiratory distress or retractions.      Breath sounds: Normal breath sounds. No stridor, decreased air movement or transmitted upper airway sounds. No wheezing, rhonchi or rales.   Musculoskeletal:         General: Normal range of motion.      Cervical back: Full passive range of motion without pain, normal range of motion and neck supple. No erythema, rigidity, tenderness or crepitus. No pain with movement. Normal range of motion.   Lymphadenopathy:      Cervical: No cervical adenopathy.   Skin:     General: Skin is warm and dry.      Findings: No rash.   Neurological:      Mental Status: She is alert. Mental status is at baseline.   Psychiatric:         Mood and Affect: Mood normal.          Behavior: Behavior normal.         Thought Content: Thought content normal.       Results for orders placed or performed in visit on 11/08/24   POCT CoV-2, Flu A/B, RSV by PCR    Collection Time: 11/08/24 12:24 PM   Result Value Ref Range    SARS-CoV-2 by PCR Positive (A) Negative, Invalid    Influenza virus A RNA Negative Negative, Invalid    Influenza virus B, PCR Negative Negative, Invalid    RSV, PCR Negative Negative, Invalid   POCT CEPHEID GROUP A STREP - PCR    Collection Time: 11/08/24 12:24 PM   Result Value Ref Range    POC Group A Strep, PCR Not Detected Not Detected, Invalid     Assessment & Plan     1. Acute cough   - POCT CoV-2, Flu A/B, RSV by PCR  - POCT CEPHEID GROUP A STREP - PCR    2. COVID-19   - Nirmatrelvir&Ritonavir 300/100 20 x 150 MG & 10 x 100MG Tablet Therapy Pack; Take 300 mg nirmatrelvir (two 150 mg tablets) with 100 mg ritonavir (one 100 mg tablet) by mouth, with all three tablets taken together twice daily for 5 days.  Dispense: 30 Each; Refill: 0       MDM/Comments:   Patient with Covid-19 in stable condition. Patient meets emergency authorization criteria for antiviral treatment.   No signs of bacterial infection on exam.    Lung sounds present and clear throughout all lung fields.         Paxlovid treatment discussed with patient including risks vs benefit and emergency authorization use. Patient opts for treatment with Paxlovid. Most recent labs reviewed, no renal dosing required.      Also encouraged conservative treatment.      Advised patient symptoms are viral in etiology, recommended supportive care. Increased fluids and rest. Recommended Tylenol for symptomatic relief and fevers. Discussed use of nedi-pot, humidifier, and Flonase nasal spray as well. Discussed good hand hygiene and ways to decrease spread of disease.  Follow-up with PCP return for reevaluation if symptoms persist/worsen. The patient demonstrated a good understanding and agreed with the treatment plan.         Illness progression and alarm symptoms discussed with patient, emphasizing low threshold for returning to clinic/emergency department for worsening symptoms. Patient is agreeable to the plan and verbalizes understanding, and will follow up if warranted.        Differential diagnosis, supportive care, and indications for immediate follow-up discussed with patient. Instructed to return to clinic or nearest emergency department for any change in condition, further concerns, or worsening of symptoms.     Recommended supportive treatment at home:     - Use a humidifier, especially at night. Cold or warm water humidifiers have the same effect.  - Antony Med squeeze bottle sinus rinses or plain nasal saline twice a day.  - Hot tea + honey + fresh lemon juice  - Frequent hand washing, rest, hydration  - Warm salt water gargles at least twice a day       Follow up with primary care provider. Follow up urgently for worsening symptoms, persistent fevers, facial swelling, visual changes, weakness, elevated heart rate, stiff neck, prolonged cough, persistent wheezing, leg swelling, or any other concerns. Seek emergency medical care immediately for: Trouble breathing, persistent pain or pressure in the chest, confusion, inability to wake or stay awake, bluish lips or face, persistent tachycardia (fast heart rate), prolonged dizziness, persistent high grade fevers.                             Electronically signed by DEVAN Rankin

## 2024-11-25 ENCOUNTER — OFFICE VISIT (OUTPATIENT)
Dept: MEDICAL GROUP | Facility: PHYSICIAN GROUP | Age: 74
End: 2024-11-25
Payer: COMMERCIAL

## 2024-11-25 VITALS
OXYGEN SATURATION: 95 % | RESPIRATION RATE: 16 BRPM | HEART RATE: 75 BPM | SYSTOLIC BLOOD PRESSURE: 124 MMHG | HEIGHT: 61 IN | BODY MASS INDEX: 28.32 KG/M2 | DIASTOLIC BLOOD PRESSURE: 70 MMHG | WEIGHT: 150 LBS | TEMPERATURE: 97.2 F

## 2024-11-25 DIAGNOSIS — H60.332 ACUTE SWIMMER'S EAR OF LEFT SIDE: ICD-10-CM

## 2024-11-25 DIAGNOSIS — R68.84 PAIN IN BONE OF JAW: ICD-10-CM

## 2024-11-25 PROCEDURE — 99213 OFFICE O/P EST LOW 20 MIN: CPT | Performed by: FAMILY MEDICINE

## 2024-11-25 PROCEDURE — 3074F SYST BP LT 130 MM HG: CPT | Performed by: FAMILY MEDICINE

## 2024-11-25 PROCEDURE — 3078F DIAST BP <80 MM HG: CPT | Performed by: FAMILY MEDICINE

## 2024-11-25 RX ORDER — CIPROFLOXACIN AND DEXAMETHASONE 3; 1 MG/ML; MG/ML
4 SUSPENSION/ DROPS AURICULAR (OTIC) 2 TIMES DAILY
Qty: 7.5 ML | Refills: 0 | Status: SHIPPED | OUTPATIENT
Start: 2024-11-25

## 2024-11-25 ASSESSMENT — FIBROSIS 4 INDEX: FIB4 SCORE: 1.12

## 2024-11-25 NOTE — PROGRESS NOTES
"Verbal consent was acquired by the patient to use [employer] ambient listening note generation during this visit.    Subjective:     HPI:   History of Present Illness  The patient is a 74-year-old female who presents today as an established patient for evaluation of mandibular pain following an incident approximately 2 weeks ago.    The patient reports that her jaw suddenly made a cracking sound while she was eating, resulting in persistent pain localized to the left side of her mandible. The pain radiates to her ear, and she suspects a possible temporomandibular joint (TMJ) dislocation. The pain is continuous and exacerbates with mastication or biting down. She experiences dysphagia and difficulty masticating on the affected side. Despite wearing dentures, she reports no dental issues. The pain persists nocturnally, for which she takes acetaminophen for analgesia. She has been chewing gum to exercise her jaw and prevent trismus. She does not use a nocturnal mouthguard.    A computed tomography (CT) scan of her maxillofacial region was performed due to sinusitis back in may. She has no prior history of temporomandibular disorders and reports no additional symptoms. She denies otalgia, otorrhea, fever, or chills. She also denies any numbness, paresthesia, or tinnitus.      Objective:     Exam:  /70 (BP Location: Left arm, Patient Position: Sitting, BP Cuff Size: Large adult)   Pulse 75   Temp 36.2 °C (97.2 °F) (Temporal)   Resp 16   Ht 1.549 m (5' 1\")   Wt 68 kg (150 lb)   LMP 11/01/2006   SpO2 95%   BMI 28.34 kg/m²  Body mass index is 28.34 kg/m².    Physical Exam  Vitals reviewed.   Constitutional:       Appearance: Normal appearance.   HENT:      Head: Normocephalic and atraumatic.      Right Ear: External ear normal.      Left Ear: External ear normal.      Nose: Nose normal.   Cardiovascular:      Rate and Rhythm: Normal rate and regular rhythm.      Pulses: Normal pulses.      Heart sounds: " Normal heart sounds. No murmur heard.  Pulmonary:      Effort: Pulmonary effort is normal. No respiratory distress.      Breath sounds: Normal breath sounds. No wheezing.   Abdominal:      General: Abdomen is flat.      Palpations: Abdomen is soft.   Skin:     General: Skin is warm and dry.   Neurological:      Mental Status: She is alert and oriented to person, place, and time.   Psychiatric:         Mood and Affect: Mood normal.         Behavior: Behavior normal.     Tenderness to palpation along the TMJ with abnormal motion and dyskinesia.  Pain with external ear mobilization/tragus mobilization.  Erythematous ear canal without abnormality of the tympanic membrane.  No bulging noted.  No erythema of the TM.        Results      Assessment & Plan:     1. Pain in bone of jaw  DX-MANDIBLE-COMPLETE - BILATERAL 4+    Referral to ENT      2. Acute swimmer's ear of left side  ciprofloxacin/dexamethasone (CIPRODEX) 0.3-0.1 % Suspension          Assessment & Plan  1. Left-sided jaw pain.  The discomfort is localized at the temporomandibular joint (TMJ), with noticeable asymmetry and limited mobility compared to the contralateral side. There is no swelling in the parotid gland area, but the ear canal appears erythematous. An x-ray of the mandible will be ordered to assess for malalignment. A referral to an oral maxillofacial surgeon or ENT specialist will be made. She is advised to increase her Tylenol intake to 500 mg three times daily, not exceeding a total of 3000 mg per day. Antibiotic eardrops will be prescribed to prevent potential ear infections, especially considering her diabetic status. If the cost of the eardrops exceeds $80, she is to inform the provider so that an alternative can be arranged. She is also instructed to report any development of fever, chills, cough, or sore throat.            Return in about 3 weeks (around 12/16/2024).    Please note that this dictation was created using voice recognition  software. I have made every reasonable attempt to correct obvious errors, but I expect that there are errors of grammar and possibly content that I did not discover before finalizing the note.    Carol Lawson MD  Family Medicine and Non - Operative Sports Medicine   [facility]

## 2024-11-26 ENCOUNTER — HOSPITAL ENCOUNTER (OUTPATIENT)
Dept: RADIOLOGY | Facility: MEDICAL CENTER | Age: 74
End: 2024-11-26
Attending: FAMILY MEDICINE
Payer: COMMERCIAL

## 2024-11-26 DIAGNOSIS — R68.84 PAIN IN BONE OF JAW: ICD-10-CM

## 2024-11-26 PROCEDURE — 70355 PANORAMIC X-RAY OF JAWS: CPT

## 2024-11-30 ENCOUNTER — APPOINTMENT (OUTPATIENT)
Dept: RADIOLOGY | Facility: MEDICAL CENTER | Age: 74
End: 2024-11-30
Attending: FAMILY MEDICINE
Payer: COMMERCIAL

## 2024-12-09 DIAGNOSIS — E03.9 HYPOTHYROIDISM, UNSPECIFIED TYPE: ICD-10-CM

## 2024-12-09 DIAGNOSIS — E11.65 UNCONTROLLED TYPE 2 DIABETES MELLITUS WITH HYPERGLYCEMIA (HCC): ICD-10-CM

## 2024-12-16 ENCOUNTER — OFFICE VISIT (OUTPATIENT)
Dept: MEDICAL GROUP | Facility: PHYSICIAN GROUP | Age: 74
End: 2024-12-16
Payer: COMMERCIAL

## 2024-12-16 VITALS
HEIGHT: 61 IN | BODY MASS INDEX: 27.75 KG/M2 | OXYGEN SATURATION: 95 % | HEART RATE: 71 BPM | TEMPERATURE: 98.3 F | WEIGHT: 147 LBS | DIASTOLIC BLOOD PRESSURE: 70 MMHG | SYSTOLIC BLOOD PRESSURE: 122 MMHG

## 2024-12-16 DIAGNOSIS — E11.9 TYPE 2 DIABETES MELLITUS WITHOUT COMPLICATION, WITHOUT LONG-TERM CURRENT USE OF INSULIN (HCC): ICD-10-CM

## 2024-12-16 DIAGNOSIS — D22.9 NEVUS: ICD-10-CM

## 2024-12-16 PROCEDURE — 3074F SYST BP LT 130 MM HG: CPT | Performed by: FAMILY MEDICINE

## 2024-12-16 PROCEDURE — 3078F DIAST BP <80 MM HG: CPT | Performed by: FAMILY MEDICINE

## 2024-12-16 PROCEDURE — 99214 OFFICE O/P EST MOD 30 MIN: CPT | Performed by: FAMILY MEDICINE

## 2024-12-16 ASSESSMENT — FIBROSIS 4 INDEX: FIB4 SCORE: 1.12

## 2024-12-16 NOTE — PROGRESS NOTES
Verbal consent was acquired by the patient to use [email or web address] ambient listening note generation during this visit.    Subjective:     HPI:   History of Present Illness  The patient is a 74-year-old female presenting for a 3-month follow-up for diabetes mellitus management. She is currently prescribed metformin and Trulicity. Her most recent HbA1c was 7.8% on 09/18/2024. She has been instructed to complete a repeat HbA1c test within the next two days. The patient reports no adverse effects from Trulicity, except for localized pruritus at the injection site, which she wishes to discuss. She has been diagnosed with microalbuminuria and is on losartan for management.    The patient has been using Trulicity for an extended period and experiences intermittent pruritus at the injection site a few hours post-injection and the following day. She adheres to aseptic technique by cleaning the injection site with alcohol before and after administration. The pruritus is confined to the injection site and does not disseminate. She has not attempted administration in the thigh. She possesses a 3-month supply of Trulicity and is contemplating a switch to either Mounjaro or Ozempic. Additionally, she is considering discontinuing metformin due to concerns regarding its potential hepatotoxicity and nephrotoxicity. She is currently on the maximum dose of metformin, 1000 mg twice daily. The patient has not yet completed her blood tests. She undergoes annual ophthalmologic evaluations at [facility], with no evidence of diabetic retinopathy.    The patient reports a long-standing mole that has recently increased in size, for which she has not sought dermatological evaluation. Additionally, she has another lesion that intermittently enlarges and becomes pruritic. She does not use sunscreen as she rarely exposes herself to sunlight.    She reports an improvement in her ear condition, with no current issues related to  "mastication. She is not experiencing any cephalalgia or otalgia nocturnally. She has completed her prescribed course of eardrops.    The patient is on levothyroxine and has missed one dose as per instructions. She has not yet completed her blood tests.    She has not received her shingles vaccine but plans to do so post-holidays. She has received her COVID-19 boosters and influenza vaccine this year. She completed her initial COVID-19 vaccination series last year.    MEDICATIONS  metformin, Trulicity, losartan, levothyroxine    IMMUNIZATIONS  She has received her COVID boosters and flu shot this year.    Health Maintenance: Completed    Objective:     Exam:  /70 (BP Location: Left arm, Patient Position: Standing, BP Cuff Size: Adult)   Pulse 71   Temp 36.8 °C (98.3 °F) (Temporal)   Ht 1.549 m (5' 1\")   Wt 66.7 kg (147 lb)   LMP 11/01/2006   SpO2 95%   BMI 27.78 kg/m²  Body mass index is 27.78 kg/m².    Physical Exam  Vitals reviewed.   Constitutional:       Appearance: Normal appearance.   HENT:      Head: Normocephalic and atraumatic.      Right Ear: External ear normal.      Left Ear: External ear normal.      Nose: Nose normal.   Cardiovascular:      Rate and Rhythm: Normal rate and regular rhythm.      Pulses: Normal pulses.      Heart sounds: Normal heart sounds. No murmur heard.  Pulmonary:      Effort: Pulmonary effort is normal. No respiratory distress.      Breath sounds: Normal breath sounds. No wheezing.   Abdominal:      General: Abdomen is flat.      Palpations: Abdomen is soft.   Skin:     General: Skin is warm and dry.   Neurological:      Mental Status: She is alert and oriented to person, place, and time.   Psychiatric:         Mood and Affect: Mood normal.         Behavior: Behavior normal.       Raised black nevi noted on the right dorsal aspect of the hand which has enlarged since previous.  Circular.  No color changes within the mole.    SImilar acicrular nevi noted on the right " flank, color changes within nevi.       Results  Laboratory Studies  Last A1c was 7.8 on 09/18/2024. GFR was 74.    Imaging  X-ray of the jaw showed no abnormalities.    Assessment & Plan:     1. Type 2 diabetes mellitus without complication, without long-term current use of insulin ([facility])            Assessment & Plan  1. Diabetes Mellitus.  Her A1c level was recorded as 7.8 on 09/18/2024. She is currently on metformin 1000 mg twice a day and Trulicity. She reports no side effects from Trulicity but experiences intermittent itching at the injection site. She was advised to apply hydrocortisone or Benadryl cream to alleviate the itching. Alternatively, she could consider switching the injection site to her thigh. If the itching persists, she may take over-the-counter Benadryl 25 mg at night. She was informed that her A1c level should decrease following her next check. She was reassured that her kidney function is satisfactory, and there is no immediate need to discontinue metformin. However, future treatment may involve discontinuing metformin and continuing with Trulicity alone. She was advised to continue her annual eye screenings. A repeat A1c test has been ordered for 12/18/2024 or thereafter.    2. Microalbuminuria.  She is currently on losartan, which helps with both blood pressure and kidney protection. The addition of Trulicity is expected to be beneficial for her kidneys.    3. Thyroid Disorder.  She is on levothyroxine and has been skipping one day as instructed. Her thyroid levels will be checked to ensure they are within the normal range.    4. Nevi.  She has a mole that has increased in size. She was advised to consult a dermatologist for an annual skin check. She was also recommended to apply sunscreen on her hands when exposed to the sun.    5. Jaw Pain.  She reports improvement in her ear and jaw pain and no longer experiences headaches or ear pain at night. Her x-ray results were normal. She was  advised to keep her dental appointment on 01/20/2025.    6. Health Maintenance.  She was advised to receive her shingles vaccine after the holidays. She was also recommended to get her COVID-19 booster shot if she has not received it within the past year.      Return in about 3 months (around 3/16/2025) for F/U DM, get A1c.    Please note that this dictation was created using voice recognition software. I have made every reasonable attempt to correct obvious errors, but I expect that there are errors of grammar and possibly content that I did not discover before finalizing the note.    Carol Lawson MD  Family Medicine and Non - Operative Sports Medicine   [facility]

## 2024-12-21 ENCOUNTER — HOSPITAL ENCOUNTER (OUTPATIENT)
Dept: LAB | Facility: MEDICAL CENTER | Age: 74
End: 2024-12-21
Attending: FAMILY MEDICINE
Payer: COMMERCIAL

## 2024-12-21 LAB
EST. AVERAGE GLUCOSE BLD GHB EST-MCNC: 166 MG/DL
HBA1C MFR BLD: 7.4 % (ref 4–5.6)
TSH SERPL DL<=0.005 MIU/L-ACNC: 1.42 UIU/ML (ref 0.38–5.33)

## 2024-12-21 PROCEDURE — 36415 COLL VENOUS BLD VENIPUNCTURE: CPT

## 2024-12-21 PROCEDURE — 83036 HEMOGLOBIN GLYCOSYLATED A1C: CPT

## 2024-12-21 PROCEDURE — 84443 ASSAY THYROID STIM HORMONE: CPT

## 2025-01-16 DIAGNOSIS — E11.65 UNCONTROLLED TYPE 2 DIABETES MELLITUS WITH HYPERGLYCEMIA (HCC): ICD-10-CM

## 2025-01-16 RX ORDER — TIRZEPATIDE 5 MG/.5ML
5 INJECTION, SOLUTION SUBCUTANEOUS
Qty: 6 ML | Refills: 0 | Status: SHIPPED | OUTPATIENT
Start: 2025-01-16

## 2025-01-21 ENCOUNTER — TELEPHONE (OUTPATIENT)
Dept: MEDICAL GROUP | Facility: PHYSICIAN GROUP | Age: 75
End: 2025-01-21
Payer: COMMERCIAL

## 2025-02-19 DIAGNOSIS — E11.65 UNCONTROLLED TYPE 2 DIABETES MELLITUS WITH HYPERGLYCEMIA (HCC): ICD-10-CM

## 2025-02-19 RX ORDER — TIRZEPATIDE 5 MG/.5ML
5 INJECTION, SOLUTION SUBCUTANEOUS
Qty: 6 ML | Refills: 0 | Status: SHIPPED | OUTPATIENT
Start: 2025-02-19

## 2025-03-04 DIAGNOSIS — E11.65 UNCONTROLLED TYPE 2 DIABETES MELLITUS WITH HYPERGLYCEMIA (HCC): ICD-10-CM

## 2025-03-04 RX ORDER — BLOOD SUGAR DIAGNOSTIC
STRIP MISCELLANEOUS
Qty: 150 STRIP | Refills: 3 | Status: SHIPPED | OUTPATIENT
Start: 2025-03-04

## 2025-03-07 ENCOUNTER — TELEPHONE (OUTPATIENT)
Dept: MEDICAL GROUP | Facility: PHYSICIAN GROUP | Age: 75
End: 2025-03-07
Payer: COMMERCIAL

## 2025-03-07 NOTE — TELEPHONE ENCOUNTER
DOCUMENTATION OF PAR STATUS:    1. Name of Medication & Dose: OneTouch Ultra strips     2. Name of Prescription Coverage Company & phone #: [employer]    3. Date Prior Auth Submitted: 03/07/25    4. What information was given to obtain insurance decision? ICD    5. Prior Auth Status? Pending    6. Patient Notified: N\A

## 2025-03-17 ENCOUNTER — APPOINTMENT (OUTPATIENT)
Dept: MEDICAL GROUP | Facility: PHYSICIAN GROUP | Age: 75
End: 2025-03-17
Payer: COMMERCIAL

## 2025-03-18 ENCOUNTER — OFFICE VISIT (OUTPATIENT)
Dept: MEDICAL GROUP | Facility: PHYSICIAN GROUP | Age: 75
End: 2025-03-18
Payer: COMMERCIAL

## 2025-03-18 VITALS
DIASTOLIC BLOOD PRESSURE: 58 MMHG | SYSTOLIC BLOOD PRESSURE: 104 MMHG | OXYGEN SATURATION: 96 % | HEART RATE: 70 BPM | BODY MASS INDEX: 27.64 KG/M2 | TEMPERATURE: 98.2 F | HEIGHT: 61 IN | WEIGHT: 146.4 LBS

## 2025-03-18 DIAGNOSIS — E03.9 HYPOTHYROIDISM, UNSPECIFIED TYPE: ICD-10-CM

## 2025-03-18 DIAGNOSIS — Z12.31 ENCOUNTER FOR SCREENING MAMMOGRAM FOR MALIGNANT NEOPLASM OF BREAST: ICD-10-CM

## 2025-03-18 DIAGNOSIS — E11.65 UNCONTROLLED TYPE 2 DIABETES MELLITUS WITH HYPERGLYCEMIA (HCC): Primary | ICD-10-CM

## 2025-03-18 DIAGNOSIS — M76.32 IT BAND SYNDROME, LEFT: ICD-10-CM

## 2025-03-18 PROCEDURE — 3078F DIAST BP <80 MM HG: CPT | Performed by: FAMILY MEDICINE

## 2025-03-18 PROCEDURE — 3074F SYST BP LT 130 MM HG: CPT | Performed by: FAMILY MEDICINE

## 2025-03-18 PROCEDURE — 99213 OFFICE O/P EST LOW 20 MIN: CPT | Performed by: FAMILY MEDICINE

## 2025-03-18 ASSESSMENT — FIBROSIS 4 INDEX: FIB4 SCORE: 1.12

## 2025-03-18 ASSESSMENT — PATIENT HEALTH QUESTIONNAIRE - PHQ9: CLINICAL INTERPRETATION OF PHQ2 SCORE: 0

## 2025-03-18 NOTE — PROGRESS NOTES
Verbal consent was acquired by the patient to use [employer] ambient listening note generation during this visit.    Subjective:     HPI:   History of Present Illness  The patient presents for evaluation of diabetes mellitus and myalgia of the left thigh following Trulicity injection.    She has been managing her diabetes mellitus with Mounjaro (tirzepatide), which she reports as being effective. She is currently in her first month of treatment with Mounjaro and plans to administer her next dose today. She typically injects the medication into her abdomen. She also continues to take metformin as part of her treatment regimen. She monitors her blood glucose levels at home, which are typically around 130 mg/dL in the morning before meals. Her weight has remained stable at 146 pounds, down from a previous weight of 155 pounds. She occasionally experiences decreased appetite, which she attributes to the Mounjaro. She reports no paresthesia in her feet. She has been experiencing intermittent myalgia for the past month, which she believes may be related to her last Trulicity (dulaglutide) injection. The pain is localized to the anterior quadriceps and iliotibial band, and does not radiate beyond the knee. She reports no associated paresthesia, weakness, erythema, induration, edema, discharge, or back pain. She has not sought any treatment for this pain, other than using a hot pad. She reports no nausea, vomiting, abdominal pain, or constipation, and her bowel movements are normal. She occasionally feels asthenic but does not experience any other symptoms.    She has not yet taken her losartan today, as she typically takes it after breakfast. She reports no lightheadedness or dizziness. She has consumed water and coffee this morning and typically only drinks coffee once a day.    She is requesting a mammogram.    MEDICATIONS  Current: Mounjaro, metformin, losartan    Health Maintenance: Completed    Objective:  "    Exam:  /58 (BP Location: Left arm, Patient Position: Sitting, BP Cuff Size: Adult)   Pulse 70   Temp 36.8 °C (98.2 °F) (Temporal)   Ht 1.549 m (5' 1\")   Wt 66.4 kg (146 lb 6.4 oz)   LMP 11/01/2006   SpO2 96%   BMI 27.66 kg/m²  Body mass index is 27.66 kg/m².    Physical Exam  Vitals reviewed.   Constitutional:       Appearance: Normal appearance.   HENT:      Head: Normocephalic and atraumatic.      Right Ear: External ear normal.      Left Ear: External ear normal.      Nose: Nose normal.   Cardiovascular:      Rate and Rhythm: Normal rate and regular rhythm.      Pulses: Normal pulses.      Heart sounds: Normal heart sounds. No murmur heard.  Pulmonary:      Effort: Pulmonary effort is normal. No respiratory distress.      Breath sounds: Normal breath sounds. No wheezing.   Abdominal:      General: Abdomen is flat.      Palpations: Abdomen is soft.   Skin:     General: Skin is warm and dry.   Neurological:      Mental Status: She is alert and oriented to person, place, and time.   Psychiatric:         Mood and Affect: Mood normal.         Behavior: Behavior normal.       General: The patient is awake, alert, oriented. Dressed appropriately with good eye contact. No acute distress.   Examination of the LEFT hip: - Straight leg raise. - log roll. 100 degrees hip flexion. 35 degrees Internal rotation. 40 external rotation. 25 abduction. + WARD. - pain to palpation over the greater trochanter. Intact strength of the iliosoas and quad.   Skin: No warmth, redness, heat or rashes. Warm, dry.     Tenderness to palpation along the anterior proximal thigh/quadricep as well as the IT band at the origin and about one third down the lateral thigh.  Positive Itz testing.          Results  Laboratory Studies  Last A1c was 7.4.    Assessment & Plan:     1. Type 2 diabetes mellitus without complication, without long-term current use of insulin ([facility])  Diabetic Monofilament LE Exam    HEMOGLOBIN A1C      2. " Uncontrolled type 2 diabetes mellitus with hyperglycemia (HCC)  Diabetic Monofilament LE Exam    HEMOGLOBIN A1C      3. Encounter for screening mammogram for malignant neoplasm of breast  MA-SCREENING MAMMO BILAT W/TOMOSYNTHESIS W/CAD      4. Hypothyroidism, unspecified type  TSH WITH REFLEX TO FT4      5. It band syndrome, left            Assessment & Plan  1. Diabetes Mellitus.  Her last A1c was 7.4, which is improved from previous readings. She is currently on Mounjaro and metformin. She reports no significant side effects from Mounjaro, except occasional weakness. She has been experiencing muscle pain, likely due to Trulicity, which she has discontinued. No signs of neuropathy were detected during the foot examination. She is advised to continue her current regimen and ensure proper hydration to avoid lightheadedness or dizziness. An order for a non-fasting A1c test will be placed, to be conducted after 06/21/2025. A urine test will be scheduled for her next visit in 4 months. She is advised to perform IT band stretches at home to alleviate muscle pain. If there is no improvement, physical therapy will be considered.    2. Low Blood Pressure.  Her blood pressure is slightly low, but she reports no lightheadedness or dizziness. She has not yet taken her losartan today. She is advised to stay hydrated and monitor her blood pressure. She should continue taking losartan after breakfast as usual.    3. Health Maintenance.  A mammogram will be ordered, as she is due for one this month. Her last mammogram was on 03/19/2024. A complete blood count (CBC) will be scheduled for September 2025 to monitor her kidney function.    Follow-up  The patient will follow up in 4 months.      Return in about 3 months (around 6/18/2025) for F/U DM, get A1c.    Please note that this dictation was created using voice recognition software. I have made every reasonable attempt to correct obvious errors, but I expect that there are errors  of grammar and possibly content that I did not discover before finalizing the note.    Carol Lawson MD  Family Medicine and Non - Operative Sports Medicine   [facility]

## 2025-03-22 ENCOUNTER — HOSPITAL ENCOUNTER (OUTPATIENT)
Dept: LAB | Facility: MEDICAL CENTER | Age: 75
End: 2025-03-22
Attending: FAMILY MEDICINE
Payer: COMMERCIAL

## 2025-03-22 DIAGNOSIS — E03.9 HYPOTHYROIDISM, UNSPECIFIED TYPE: ICD-10-CM

## 2025-03-22 DIAGNOSIS — E11.65 UNCONTROLLED TYPE 2 DIABETES MELLITUS WITH HYPERGLYCEMIA (HCC): ICD-10-CM

## 2025-03-22 LAB
EST. AVERAGE GLUCOSE BLD GHB EST-MCNC: 160 MG/DL
HBA1C MFR BLD: 7.2 % (ref 4–5.6)
TSH SERPL DL<=0.005 MIU/L-ACNC: 0.75 UIU/ML (ref 0.38–5.33)

## 2025-03-22 PROCEDURE — 83036 HEMOGLOBIN GLYCOSYLATED A1C: CPT

## 2025-03-22 PROCEDURE — 36415 COLL VENOUS BLD VENIPUNCTURE: CPT

## 2025-03-22 PROCEDURE — 84443 ASSAY THYROID STIM HORMONE: CPT

## 2025-03-24 ENCOUNTER — RESULTS FOLLOW-UP (OUTPATIENT)
Dept: MEDICAL GROUP | Facility: PHYSICIAN GROUP | Age: 75
End: 2025-03-24
Payer: COMMERCIAL

## 2025-04-17 ENCOUNTER — OFFICE VISIT (OUTPATIENT)
Dept: MEDICAL GROUP | Facility: PHYSICIAN GROUP | Age: 75
End: 2025-04-17
Payer: MEDICARE

## 2025-04-17 ENCOUNTER — RESULTS FOLLOW-UP (OUTPATIENT)
Dept: MEDICAL GROUP | Facility: PHYSICIAN GROUP | Age: 75
End: 2025-04-17

## 2025-04-17 ENCOUNTER — HOSPITAL ENCOUNTER (OUTPATIENT)
Facility: MEDICAL CENTER | Age: 75
End: 2025-04-17
Attending: FAMILY MEDICINE
Payer: COMMERCIAL

## 2025-04-17 ENCOUNTER — HOSPITAL ENCOUNTER (OUTPATIENT)
Dept: RADIOLOGY | Facility: MEDICAL CENTER | Age: 75
End: 2025-04-17
Attending: FAMILY MEDICINE
Payer: COMMERCIAL

## 2025-04-17 VITALS
OXYGEN SATURATION: 94 % | WEIGHT: 143.6 LBS | TEMPERATURE: 97.6 F | HEIGHT: 61 IN | SYSTOLIC BLOOD PRESSURE: 120 MMHG | BODY MASS INDEX: 27.11 KG/M2 | HEART RATE: 73 BPM | DIASTOLIC BLOOD PRESSURE: 66 MMHG

## 2025-04-17 DIAGNOSIS — M25.552 PAIN OF LEFT HIP: ICD-10-CM

## 2025-04-17 DIAGNOSIS — E11.9 TYPE 2 DIABETES MELLITUS WITHOUT COMPLICATION, WITHOUT LONG-TERM CURRENT USE OF INSULIN (HCC): ICD-10-CM

## 2025-04-17 LAB
CREAT UR-MCNC: 72.9 MG/DL
MICROALBUMIN UR-MCNC: <1.2 MG/DL
MICROALBUMIN/CREAT UR: NORMAL MG/G (ref 0–30)

## 2025-04-17 PROCEDURE — 82043 UR ALBUMIN QUANTITATIVE: CPT

## 2025-04-17 PROCEDURE — 3074F SYST BP LT 130 MM HG: CPT | Performed by: FAMILY MEDICINE

## 2025-04-17 PROCEDURE — 3078F DIAST BP <80 MM HG: CPT | Performed by: FAMILY MEDICINE

## 2025-04-17 PROCEDURE — 99213 OFFICE O/P EST LOW 20 MIN: CPT | Performed by: FAMILY MEDICINE

## 2025-04-17 PROCEDURE — 73521 X-RAY EXAM HIPS BI 2 VIEWS: CPT

## 2025-04-17 PROCEDURE — 82570 ASSAY OF URINE CREATININE: CPT

## 2025-04-17 RX ORDER — IBUPROFEN 400 MG/1
400 TABLET, FILM COATED ORAL EVERY 6 HOURS PRN
Qty: 90 TABLET | Refills: 0 | Status: SHIPPED | OUTPATIENT
Start: 2025-04-17

## 2025-04-17 ASSESSMENT — FIBROSIS 4 INDEX: FIB4 SCORE: 1.12

## 2025-04-17 NOTE — PROGRESS NOTES
"Verbal consent was acquired by the patient to use [employer] ambient listening note generation during this visit.    Subjective:     HPI:   History of Present Illness  The patient presents for evaluation of left hip arthralgia.    The patient reports severe pain localized to the left hip joint, with radiation extending to the knee. The onset of this pain occurred approximately one month ago, with no preceding injury, fall, or trauma. The patient denies experiencing any numbness or paresthesia in the affected limb but does report a sensation of weakness. The pain exacerbates during ambulation and occasionally extends distal to the knee. Analgesic management has been attempted with acetaminophen 500 mg every 4 hours; however, the patient expresses concern regarding potential nephrotoxicity associated with prolonged analgesic use. Ibuprofen is not available at the patient's residence.    The patient has a long-standing history of diabetes mellitus.    Health Maintenance: Completed    Objective:     Exam:  /66 (BP Location: Right arm, Patient Position: Sitting, BP Cuff Size: Adult)   Pulse 73   Temp 36.4 °C (97.6 °F) (Temporal)   Ht 1.549 m (5' 1\")   Wt 65.1 kg (143 lb 9.6 oz)   LMP 11/01/2006   SpO2 94%   BMI 27.13 kg/m²  Body mass index is 27.13 kg/m².    Physical Exam  Vitals reviewed.   Constitutional:       Appearance: Normal appearance.   HENT:      Head: Normocephalic and atraumatic.      Right Ear: External ear normal.      Left Ear: External ear normal.      Nose: Nose normal.   Cardiovascular:      Rate and Rhythm: Normal rate and regular rhythm.      Pulses: Normal pulses.      Heart sounds: Normal heart sounds. No murmur heard.  Pulmonary:      Effort: Pulmonary effort is normal. No respiratory distress.      Breath sounds: Normal breath sounds. No wheezing.   Abdominal:      General: Abdomen is flat.      Palpations: Abdomen is soft.   Skin:     General: Skin is warm and dry.   Neurological: "      Mental Status: She is alert and oriented to person, place, and time.   Psychiatric:         Mood and Affect: Mood normal.         Behavior: Behavior normal.     General: The patient is awake, alert, oriented. Dressed appropriately with good eye contact. No acute distress.   Examination of the LEFT hip: - Straight leg raise. + log roll. 100 degrees hip flexion. 30 degrees Internal rotation. 25 external rotation. 25 abduction. + WARD. +FAIR - pain to palpation over the greater trochanter. Intact strength of the iliosoas and quad.   Skin: No warmth, redness, heat or rashes. Warm, dry.      Tenderness to palpation along the anterior proximal thigh/quadricep as well as the IT band at the origin and about one third down the lateral thigh.  Positive Itz testing.    Pain with resisted extension, internal and external rotation, adduction and abduction.       Results      Assessment & Plan:     1. Type 2 diabetes mellitus without complication, without long-term current use of insulin ([facility])  MICROALBUMIN CREAT RATIO URINE    CANCELED: Microalbumin Creat Ratio Urine (Lab Collect)      2. Pain of left hip  DX-HIP-BILATERAL-WITH PELVIS-3/4 VIEWS          Assessment & Plan  1. Left hip pain.  - The primary concern is left hip pain radiating to the knee, which started about a month ago without any injury or trauma. The pain is localized to the hip joint and sometimes extends to the knee. There is no numbness or tingling in the leg.  - Physical examination revealed pain upon movement and rotation of the hip. The possibility of severe arthritis in the hip is considered.  - An x-ray of the left hip will be ordered to further investigate the cause of the pain.  - She is advised to take two Tylenol 500 mg tablets three times a day, with a maximum daily dosage of 3000 mg. If the pain persists, she may supplement this regimen with ibuprofen. A prescription for ibuprofen will be provided. Depending on the x-ray results, a steroid  injection into the hip joint by an orthopedic specialist may be recommended.    2. Diabetes mellitus.  - She has a long-standing history of diabetes.  - Her diabetic screenings are up to date.  - A urine sample will be collected today to check for proteinuria, a common complication of diabetes.  - An A1c test will be conducted during her next visit in 2 months.    Follow-up  - The patient will follow up in 2 months.        Return in about 3 months (around 7/17/2025).    Please note that this dictation was created using voice recognition software. I have made every reasonable attempt to correct obvious errors, but I expect that there are errors of grammar and possibly content that I did not discover before finalizing the note.    Carol Lawson MD  Family Medicine and Non - Operative Sports Medicine   [facility]

## 2025-04-20 ENCOUNTER — RESULTS FOLLOW-UP (OUTPATIENT)
Dept: MEDICAL GROUP | Facility: PHYSICIAN GROUP | Age: 75
End: 2025-04-20

## 2025-04-21 DIAGNOSIS — M25.552 PAIN OF LEFT HIP: ICD-10-CM

## 2025-04-23 NOTE — Clinical Note
REFERRAL APPROVAL NOTICE         Sent on April 23, 2025                   Bernadette Cesar Freitas  [address] [place] [address]                   Dear Ms. Freitas,    After a careful review of the medical information and benefit coverage, [facility] has processed your referral. See below for additional details.    If applicable, you must be actively enrolled with your insurance for coverage of the authorized service. If you have any questions regarding your coverage, please contact your insurance directly.    REFERRAL INFORMATION   Referral #:  [id number]  Referred-To Provider    Referred-By Provider:  Physical Therapy    Carol Lawson M.D.   [facility]      [address]  [place] [id number] [phone] [address] [place] [address]  [phone]    Referral Start Date:  04/21/2025  Referral End Date:   04/21/2026             SCHEDULING  If you do not already have an appointment, please call [phone] to make an appointment.     MORE INFORMATION  If you do not already have a [employer] account, sign up at: [email or web address]  You can access your medical information, make appointments, see lab results, billing information, and more.  If you have questions regarding this referral, please contact  the [facility] Referrals department at:             [phone]. Monday - Friday 8:00AM - 5:00PM.     Sincerely,    [facility]

## 2025-05-03 DIAGNOSIS — E11.65 UNCONTROLLED TYPE 2 DIABETES MELLITUS WITH HYPERGLYCEMIA (HCC): ICD-10-CM

## 2025-05-03 DIAGNOSIS — F33.9 RECURRENT MAJOR DEPRESSIVE DISORDER, REMISSION STATUS UNSPECIFIED (HCC): ICD-10-CM

## 2025-05-03 DIAGNOSIS — E78.5 DYSLIPIDEMIA: ICD-10-CM

## 2025-05-05 RX ORDER — ESCITALOPRAM OXALATE 10 MG/1
10 TABLET ORAL DAILY
Qty: 90 TABLET | Refills: 3 | Status: SHIPPED | OUTPATIENT
Start: 2025-05-05

## 2025-05-05 RX ORDER — ATORVASTATIN CALCIUM 40 MG/1
40 TABLET, FILM COATED ORAL EVERY EVENING
Qty: 90 TABLET | Refills: 3 | Status: SHIPPED | OUTPATIENT
Start: 2025-05-05

## 2025-05-05 RX ORDER — TIRZEPATIDE 5 MG/.5ML
5 INJECTION, SOLUTION SUBCUTANEOUS
Qty: 6 ML | Refills: 0 | Status: SHIPPED | OUTPATIENT
Start: 2025-05-05

## 2025-05-05 NOTE — TELEPHONE ENCOUNTER
Received request via: Patient    Was the patient seen in the last year in this department? Yes    Does the patient have an active prescription (recently filled or refills available) for medication(s) requested? No    Pharmacy Name: [facility]    Does the patient have skilled nursing Plus and need 100-day supply? (This applies to ALL medications) Patient does not have SCP

## 2025-06-02 DIAGNOSIS — E11.69 TYPE 2 DIABETES MELLITUS WITH OTHER SPECIFIED COMPLICATION, WITHOUT LONG-TERM CURRENT USE OF INSULIN (HCC): ICD-10-CM

## 2025-06-02 NOTE — TELEPHONE ENCOUNTER
Received request via: Pharmacy    Was the patient seen in the last year in this department? Yes    Does the patient have an active prescription (recently filled or refills available) for medication(s) requested? No    Pharmacy Name: [facility]    Does the patient have group home Plus and need 100-day supply? (This applies to ALL medications) Patient does not have SCP

## 2025-07-19 DIAGNOSIS — E11.65 UNCONTROLLED TYPE 2 DIABETES MELLITUS WITH HYPERGLYCEMIA (HCC): ICD-10-CM

## 2025-07-19 DIAGNOSIS — J32.0 MAXILLARY SINUSITIS, CHRONIC: ICD-10-CM

## 2025-07-19 NOTE — TELEPHONE ENCOUNTER
Received request via: Patient    Was the patient seen in the last year in this department? Yes    Does the patient have an active prescription (recently filled or refills available) for medication(s) requested? No    Pharmacy Name: [id number] - [facility], [place] - [address] [place] [address]      Does the patient have snf Plus and need 100-day supply? (This applies to ALL medications) Patient does not have SCP

## 2025-07-21 RX ORDER — TIRZEPATIDE 5 MG/.5ML
INJECTION, SOLUTION SUBCUTANEOUS
Qty: 6 ML | Refills: 3 | Status: SHIPPED | OUTPATIENT
Start: 2025-07-21 | End: 2025-07-28 | Stop reason: SDUPTHER

## 2025-07-21 RX ORDER — FLUTICASONE PROPIONATE 50 MCG
2 SPRAY, SUSPENSION (ML) NASAL DAILY
Qty: 16 G | Refills: 3 | Status: SHIPPED | OUTPATIENT
Start: 2025-07-21

## 2025-07-28 ENCOUNTER — TELEPHONE (OUTPATIENT)
Dept: MEDICAL GROUP | Facility: PHYSICIAN GROUP | Age: 75
End: 2025-07-28

## 2025-07-28 ENCOUNTER — APPOINTMENT (OUTPATIENT)
Dept: MEDICAL GROUP | Facility: PHYSICIAN GROUP | Age: 75
End: 2025-07-28
Payer: MEDICARE

## 2025-07-28 VITALS
HEART RATE: 74 BPM | RESPIRATION RATE: 14 BRPM | SYSTOLIC BLOOD PRESSURE: 108 MMHG | OXYGEN SATURATION: 94 % | BODY MASS INDEX: 27.03 KG/M2 | HEIGHT: 61 IN | DIASTOLIC BLOOD PRESSURE: 58 MMHG | WEIGHT: 143.2 LBS | TEMPERATURE: 99.4 F

## 2025-07-28 DIAGNOSIS — K21.9 GASTROESOPHAGEAL REFLUX DISEASE, UNSPECIFIED WHETHER ESOPHAGITIS PRESENT: ICD-10-CM

## 2025-07-28 DIAGNOSIS — R14.2 BELCHING: ICD-10-CM

## 2025-07-28 DIAGNOSIS — E78.5 DYSLIPIDEMIA: ICD-10-CM

## 2025-07-28 DIAGNOSIS — I10 ESSENTIAL HYPERTENSION: ICD-10-CM

## 2025-07-28 DIAGNOSIS — E03.9 HYPOTHYROIDISM, UNSPECIFIED TYPE: ICD-10-CM

## 2025-07-28 DIAGNOSIS — E11.65 UNCONTROLLED TYPE 2 DIABETES MELLITUS WITH HYPERGLYCEMIA (HCC): Primary | ICD-10-CM

## 2025-07-28 DIAGNOSIS — R13.12 OROPHARYNGEAL DYSPHAGIA: ICD-10-CM

## 2025-07-28 DIAGNOSIS — Z00.00 HEALTHCARE MAINTENANCE: ICD-10-CM

## 2025-07-28 DIAGNOSIS — F33.9 RECURRENT MAJOR DEPRESSIVE DISORDER, REMISSION STATUS UNSPECIFIED (HCC): ICD-10-CM

## 2025-07-28 LAB
HBA1C MFR BLD: 6.7 % (ref ?–5.8)
POCT INT CON NEG: NEGATIVE
POCT INT CON POS: POSITIVE

## 2025-07-28 PROCEDURE — 3078F DIAST BP <80 MM HG: CPT | Performed by: FAMILY MEDICINE

## 2025-07-28 PROCEDURE — 3074F SYST BP LT 130 MM HG: CPT | Performed by: FAMILY MEDICINE

## 2025-07-28 PROCEDURE — 83036 HEMOGLOBIN GLYCOSYLATED A1C: CPT | Performed by: FAMILY MEDICINE

## 2025-07-28 PROCEDURE — 99214 OFFICE O/P EST MOD 30 MIN: CPT | Performed by: FAMILY MEDICINE

## 2025-07-28 RX ORDER — LEVOTHYROXINE SODIUM 88 UG/1
88 TABLET ORAL
Qty: 90 TABLET | Refills: 3 | Status: SHIPPED | OUTPATIENT
Start: 2025-07-28

## 2025-07-28 RX ORDER — OMEPRAZOLE 20 MG/1
20 CAPSULE, DELAYED RELEASE ORAL DAILY
Qty: 90 CAPSULE | Refills: 3 | Status: SHIPPED | OUTPATIENT
Start: 2025-07-28

## 2025-07-28 RX ORDER — TIRZEPATIDE 5 MG/.5ML
5 INJECTION, SOLUTION SUBCUTANEOUS
Qty: 6 ML | Refills: 3 | Status: SHIPPED | OUTPATIENT
Start: 2025-07-28

## 2025-07-28 RX ORDER — CARVEDILOL 12.5 MG/1
12.5 TABLET ORAL 2 TIMES DAILY WITH MEALS
Qty: 180 TABLET | Refills: 3 | Status: SHIPPED | OUTPATIENT
Start: 2025-07-28

## 2025-07-28 RX ORDER — LOSARTAN POTASSIUM 50 MG/1
50 TABLET ORAL 2 TIMES DAILY
Qty: 180 TABLET | Refills: 3 | Status: SHIPPED | OUTPATIENT
Start: 2025-07-28

## 2025-07-28 RX ORDER — FAMOTIDINE 20 MG/1
20 TABLET, FILM COATED ORAL 2 TIMES DAILY
Qty: 180 TABLET | Refills: 3 | Status: SHIPPED | OUTPATIENT
Start: 2025-07-28

## 2025-07-28 RX ORDER — ESCITALOPRAM OXALATE 10 MG/1
10 TABLET ORAL DAILY
Qty: 90 TABLET | Refills: 3 | Status: SHIPPED | OUTPATIENT
Start: 2025-07-28

## 2025-07-28 ASSESSMENT — FIBROSIS 4 INDEX: FIB4 SCORE: 1.12

## 2025-07-28 NOTE — TELEPHONE ENCOUNTER
"Physical Therapy paperwork received from [facility] requiring provider signature.      All appropriate fields completed by Medical Assistant: No    Paperwork placed in \"MA to Provider\" folder/basket. Awaiting provider completion/signature.  "

## 2025-07-28 NOTE — PROGRESS NOTES
"Verbal consent was acquired by the patient to use Next[employer] ambient listening note generation during this visit.    Subjective:     HPI:   History of Present Illness  The patient presents for evaluation of gastroesophageal reflux disease (GERD) and diabetes mellitus.    She reports experiencing a sensation of dysphagia characterized by a feeling of obstruction in her throat during deglutition, accompanied by frequent eructation. She does not report odynophagia. Her symptoms are absent during sleep. The patient is currently prescribed famotidine 20 mg twice daily for GERD management. She expresses concern regarding the pyrosis associated with eructation and is contemplating a transition to omeprazole.    The patient has noted weight loss, which she attributes to her current pharmacotherapy, including tirzepatide (Mounjaro). She adheres to a diet that includes fruits and occasional sweets. Her fasting blood glucose level was recorded as 114 mg/dL prior to breakfast today. She is also on metformin 1000 mg twice daily. Her glycated hemoglobin (A1c) has improved from 7.2% to 6.7%.    She reports improvement in her hip condition with ongoing physical therapy, which she has been undergoing for 8 weeks.    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    Social History:  Diet: She adheres to a diet that includes fruits and occasional sweets.  Sleep: She reports no symptoms during sleep.    Health Maintenance: Completed    Objective:     Exam:  /58 (BP Location: Left arm, Patient Position: Sitting, BP Cuff Size: Adult)   Pulse 74   Temp 37.4 °C (99.4 °F) (Temporal)   Resp 14   Ht 1.549 m (5' 1\")   Wt 65 kg (143 lb 3.2 oz)   LMP 11/01/2006   SpO2 94%   BMI 27.06 kg/m²  Body mass index is 27.06 kg/m².    Physical Exam  Vitals reviewed.   Constitutional:       Appearance: Normal appearance.   HENT:      Head: Normocephalic and atraumatic.      Right Ear: External ear normal.      Left Ear: " External ear normal.      Nose: Nose normal. No congestion or rhinorrhea.      Mouth/Throat:      Mouth: Mucous membranes are moist.   Eyes:      Extraocular Movements: Extraocular movements intact.      Pupils: Pupils are equal, round, and reactive to light.   Cardiovascular:      Rate and Rhythm: Normal rate and regular rhythm.      Pulses: Normal pulses.      Heart sounds: Normal heart sounds. No murmur heard.  Pulmonary:      Effort: Pulmonary effort is normal. No respiratory distress.      Breath sounds: Normal breath sounds. No wheezing.   Abdominal:      General: Abdomen is flat. Bowel sounds are normal. There is no distension.      Palpations: Abdomen is soft.      Tenderness: There is no abdominal tenderness.   Musculoskeletal:      Cervical back: Neck supple.   Skin:     General: Skin is warm and dry.      Capillary Refill: Capillary refill takes less than 2 seconds.   Neurological:      General: No focal deficit present.      Mental Status: She is alert and oriented to person, place, and time.   Psychiatric:         Mood and Affect: Mood normal.         Behavior: Behavior normal.             Results  Labs   - A1c: 6.7   - Blood sugar: 114    Assessment & Plan:     1. Uncontrolled type 2 diabetes mellitus with hyperglycemia ([facility])  POCT  A1C    Tirzepatide (MOUNJARO) 5 MG/0.5ML Solution Auto-injector    metformin (GLUCOPHAGE) 1000 MG tablet    HEMOGLOBIN A1C    Comp Metabolic Panel      2. Belching  DX-ESOPHAGUS BARIUM SWALLOW SINGLE CONTRAST      3. Oropharyngeal dysphagia  DX-ESOPHAGUS BARIUM SWALLOW SINGLE CONTRAST      4. Essential hypertension  losartan (COZAAR) 50 MG Tab    Comp Metabolic Panel      5. Hypothyroidism, unspecified type  levothyroxine (SYNTHROID) 88 MCG Tab    TSH WITH REFLEX TO FT4      6. Recurrent major depressive disorder, remission status unspecified ([facility])  escitalopram (LEXAPRO) 10 MG Tab      7. Gastroesophageal reflux disease, unspecified whether esophagitis present  famotidine  (PEPCID) 20 MG Tab      8. Dyslipidemia  Lipid Profile      9. Healthcare maintenance  CBC WITHOUT DIFFERENTIAL          Assessment & Plan  1. Acid reflux.  - Reports sensation of blockage in throat when swallowing and frequent belching.  - Currently taking famotidine 20 mg twice daily.  - Barium swallow study will be ordered to evaluate the esophagus.  - Omeprazole 20 mg will be added to regimen, to be taken once daily before bed. Prescription for omeprazole will be sent to pharmacy.    2. Diabetes mellitus.  - A1c improved from 7.2 to 6.7, indicating better control.  - Currently taking Mounjaro 5 mg and metformin 1000 mg twice daily.  - Will continue current medications. Prescription for Mounjaro will be sent to pharmacy.  - Annual labs will be ordered in 3 months to monitor condition.    3. Medication management.  - Refills for Coreg and Lipitor will be sent to pharmacy.  - Blood pressure is stable at 143.    Follow-up: Next scheduled visit in 4 months.          Return in about 4 months (around 11/28/2025) for Annual/wellness visit.    Please note that this dictation was created using voice recognition software. I have made every reasonable attempt to correct obvious errors, but I expect that there are errors of grammar and possibly content that I did not discover before finalizing the note.    Carol Lawson MD  Family Medicine and Non - Operative Sports Medicine   [facility]

## 2025-08-07 ENCOUNTER — APPOINTMENT (OUTPATIENT)
Dept: MEDICAL GROUP | Facility: PHYSICIAN GROUP | Age: 75
End: 2025-08-07
Payer: COMMERCIAL

## 2025-08-07 ENCOUNTER — TELEPHONE (OUTPATIENT)
Dept: MEDICAL GROUP | Facility: PHYSICIAN GROUP | Age: 75
End: 2025-08-07

## 2025-08-19 ENCOUNTER — APPOINTMENT (OUTPATIENT)
Dept: RADIOLOGY | Facility: MEDICAL CENTER | Age: 75
End: 2025-08-19
Attending: FAMILY MEDICINE
Payer: MEDICARE

## 2025-08-19 ASSESSMENT — FIBROSIS 4 INDEX: FIB4 SCORE: 1.12
